# Patient Record
Sex: FEMALE | Race: WHITE | Employment: OTHER | URBAN - METROPOLITAN AREA
[De-identification: names, ages, dates, MRNs, and addresses within clinical notes are randomized per-mention and may not be internally consistent; named-entity substitution may affect disease eponyms.]

---

## 2021-03-16 ENCOUNTER — TELEPHONE (OUTPATIENT)
Dept: PRIMARY CARE CLINIC | Age: 41
End: 2021-03-16

## 2021-03-16 ENCOUNTER — OFFICE VISIT (OUTPATIENT)
Dept: PRIMARY CARE CLINIC | Age: 41
End: 2021-03-16
Payer: MEDICAID

## 2021-03-16 VITALS
SYSTOLIC BLOOD PRESSURE: 120 MMHG | HEIGHT: 64 IN | RESPIRATION RATE: 20 BRPM | TEMPERATURE: 96.2 F | HEART RATE: 89 BPM | OXYGEN SATURATION: 98 % | DIASTOLIC BLOOD PRESSURE: 82 MMHG | BODY MASS INDEX: 45.41 KG/M2 | WEIGHT: 266 LBS

## 2021-03-16 DIAGNOSIS — K58.1 IRRITABLE BOWEL SYNDROME WITH CONSTIPATION: ICD-10-CM

## 2021-03-16 DIAGNOSIS — L73.2 HIDRADENITIS SUPPURATIVA: ICD-10-CM

## 2021-03-16 DIAGNOSIS — F32.A ANXIETY AND DEPRESSION: ICD-10-CM

## 2021-03-16 DIAGNOSIS — N80.9 ENDOMETRIOSIS: ICD-10-CM

## 2021-03-16 DIAGNOSIS — I10 ESSENTIAL HYPERTENSION: ICD-10-CM

## 2021-03-16 DIAGNOSIS — Z80.8 FHX: THYROID CANCER: ICD-10-CM

## 2021-03-16 DIAGNOSIS — L40.50 PSORIATIC ARTHRITIS (HCC): Primary | ICD-10-CM

## 2021-03-16 DIAGNOSIS — E28.2 PCOS (POLYCYSTIC OVARIAN SYNDROME): ICD-10-CM

## 2021-03-16 DIAGNOSIS — F41.9 ANXIETY AND DEPRESSION: ICD-10-CM

## 2021-03-16 PROCEDURE — 99204 OFFICE O/P NEW MOD 45 MIN: CPT | Performed by: FAMILY MEDICINE

## 2021-03-16 PROCEDURE — G8484 FLU IMMUNIZE NO ADMIN: HCPCS | Performed by: FAMILY MEDICINE

## 2021-03-16 PROCEDURE — 1036F TOBACCO NON-USER: CPT | Performed by: FAMILY MEDICINE

## 2021-03-16 PROCEDURE — G8427 DOCREV CUR MEDS BY ELIG CLIN: HCPCS | Performed by: FAMILY MEDICINE

## 2021-03-16 PROCEDURE — G8417 CALC BMI ABV UP PARAM F/U: HCPCS | Performed by: FAMILY MEDICINE

## 2021-03-16 RX ORDER — AMLODIPINE BESYLATE 5 MG/1
5 TABLET ORAL DAILY
COMMUNITY
End: 2021-03-18 | Stop reason: SDUPTHER

## 2021-03-16 RX ORDER — SPIRONOLACTONE 100 MG/1
100 TABLET, FILM COATED ORAL 2 TIMES DAILY
COMMUNITY
End: 2021-11-24

## 2021-03-16 RX ORDER — ESCITALOPRAM OXALATE 20 MG/1
TABLET ORAL
COMMUNITY
Start: 2021-01-12 | End: 2022-04-20 | Stop reason: SDUPTHER

## 2021-03-16 RX ORDER — LANCETS 33 GAUGE
EACH MISCELLANEOUS
COMMUNITY
Start: 2020-09-08

## 2021-03-16 RX ORDER — LORATADINE 10 MG/1
10 TABLET ORAL DAILY
COMMUNITY
End: 2022-05-02 | Stop reason: ALTCHOICE

## 2021-03-16 RX ORDER — PREDNISONE 10 MG/1
40 TABLET ORAL DAILY PRN
COMMUNITY

## 2021-03-16 RX ORDER — CLINDAMYCIN PHOSPHATE 11.9 MG/ML
SOLUTION TOPICAL
COMMUNITY
Start: 2021-01-30

## 2021-03-16 RX ORDER — INFLIXIMAB-DYYB 100 MG/10ML
INJECTION, POWDER, LYOPHILIZED, FOR SOLUTION INTRAVENOUS
COMMUNITY

## 2021-03-16 RX ORDER — BENZONATATE 100 MG/1
100 CAPSULE ORAL 3 TIMES DAILY PRN
COMMUNITY
Start: 2021-02-09 | End: 2022-05-19

## 2021-03-16 RX ORDER — BUSPIRONE HYDROCHLORIDE 5 MG/1
TABLET ORAL
COMMUNITY
Start: 2020-12-28 | End: 2021-07-26 | Stop reason: SDUPTHER

## 2021-03-16 RX ORDER — POLYETHYLENE GLYCOL 3350 17 G/17G
17 POWDER, FOR SOLUTION ORAL DAILY
COMMUNITY
Start: 2020-06-19

## 2021-03-16 RX ORDER — PHENTERMINE HYDROCHLORIDE 37.5 MG/1
37.5 TABLET ORAL
COMMUNITY
Start: 2021-02-17 | End: 2021-03-19

## 2021-03-16 RX ORDER — PANTOPRAZOLE SODIUM 40 MG/1
TABLET, DELAYED RELEASE ORAL
COMMUNITY
Start: 2021-02-27

## 2021-03-16 RX ORDER — SEMAGLUTIDE 1.34 MG/ML
1 INJECTION, SOLUTION SUBCUTANEOUS
COMMUNITY
Start: 2021-01-21

## 2021-03-16 RX ORDER — ONDANSETRON 4 MG/1
4 TABLET, FILM COATED ORAL EVERY 8 HOURS PRN
COMMUNITY
Start: 2020-10-08 | End: 2022-05-02

## 2021-03-16 SDOH — ECONOMIC STABILITY: TRANSPORTATION INSECURITY
IN THE PAST 12 MONTHS, HAS LACK OF TRANSPORTATION KEPT YOU FROM MEETINGS, WORK, OR FROM GETTING THINGS NEEDED FOR DAILY LIVING?: NO

## 2021-03-16 SDOH — ECONOMIC STABILITY: FOOD INSECURITY: WITHIN THE PAST 12 MONTHS, YOU WORRIED THAT YOUR FOOD WOULD RUN OUT BEFORE YOU GOT MONEY TO BUY MORE.: NEVER TRUE

## 2021-03-16 SDOH — ECONOMIC STABILITY: FOOD INSECURITY: WITHIN THE PAST 12 MONTHS, THE FOOD YOU BOUGHT JUST DIDN'T LAST AND YOU DIDN'T HAVE MONEY TO GET MORE.: NEVER TRUE

## 2021-03-16 ASSESSMENT — ENCOUNTER SYMPTOMS
CONSTIPATION: 1
NAUSEA: 1
SORE THROAT: 0
ABDOMINAL PAIN: 1
BLOOD IN STOOL: 1
SHORTNESS OF BREATH: 0
COUGH: 0
VOMITING: 1

## 2021-03-16 NOTE — PROGRESS NOTES
Chief Complaint   Patient presents with    New Patient     NP to establish         Franki Lefort is a 36 y.o. female who presents to be established. Pt has a hx of psoriatic arthritis and is followed by Rheumatology and is on IV inflectra medication x 2 months with fair control (pt used to be on IV remicade but had to switch to another medication secondary to insurance issues)    Patient had recent bloodwork [ESR, CRP, CMP, CBC] 02/20/2021 which was WNL except for an elevated ESR 54    Pt denies any hx of diabetes or CAD and pt reports a normal cardiac cath in 2010    Pt has a hx of Hidradenitis suppurativa since she was a teenager and is followed by [facility] and has had multiple surgeries (most recently 01/2020) along with an episode sepsis 2018. 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 Pt is with good control on IV inflectra medication and does take bursts of prednisone as needed     Patient has a past medical history significant for HTN and is on norvasc 5 mg daily    Pt is on linzess and protonix for irritable bowel and was being followed by GI and takes miralax daily but ran out of her linzess 2 weeks ago    Pt is on lexapro and buspar for anxiety/depression with good control. Pt is followed by Weight Management and is on adipex and ozempic and has lost 30 lbs    Pt has a hx of PCOS and endometriosis and is followed by OB/Gyne and is on mirena medication    Surgical hx includes cholecystectomy, detached retina L eye surgery and pt is followed by Ophthalmology      Pt is a nonsmoker and denies alcohol use    FHx positive for thyroid cancer (sister) and early CAD (father)    Review of Systems   Constitutional: Positive for fatigue. Negative for fever. HENT: Negative for nosebleeds and sore throat. Eyes:        Negative blurred vision or diplopia   Respiratory: Negative for cough and shortness of breath.     Cardiovascular: Positive for leg swelling (chronic and pt reports normal LE venous dopplers 2 years ago). Negative for chest pain and palpitations. Gastrointestinal: Positive for abdominal pain (crampy at times (chronic)), blood in stool (at times and pt reports normal colonoscopy 2017), constipation (with no BM x 10 days), nausea (at times) and vomiting (at times). Negative melena or indigestion   Endocrine: Negative for polydipsia and polyuria. Genitourinary: Negative for dysuria and hematuria. Musculoskeletal: Positive for arthralgias. Skin: Positive for rash (psoriatic at times). Neurological: Positive for headaches (at times). Negative for dizziness, seizures, syncope, speech difficulty and weakness. Psychiatric/Behavioral: Negative for dysphoric mood. The patient is not nervous/anxious. Vitals:    03/16/21 1029   BP: 120/82   Pulse: 89   Resp: 20   Temp: 96.2 °F (35.7 °C)   SpO2: 98%         Physical Exam  Vitals signs reviewed. Constitutional:       General: She is not in acute distress. Appearance: She is obese. HENT:      Mouth/Throat:      Mouth: Mucous membranes are moist.      Pharynx: Oropharynx is clear. Eyes:      General: No scleral icterus. Comments: Pink conjunctivae    Neck:      Thyroid: No thyromegaly. Comments: No carotid bruits noted B/L  Cardiovascular:      Heart sounds: Normal heart sounds. No murmur. No friction rub. No gallop. Pulmonary:      Effort: Pulmonary effort is normal.      Breath sounds: Normal breath sounds. Abdominal:      Palpations: Abdomen is soft. Tenderness: There is abdominal tenderness (mild diffuse). There is no guarding or rebound. Musculoskeletal:      Comments: Positive  2-3+ leg edema but no calf tendernous to palpation noted B/L   Lymphadenopathy:      Cervical: No cervical adenopathy. Skin:     Findings: No rash. Neurological:      Mental Status: She is alert.       Comments: Cranial nerves 2 - 12 grossly intact; Muscle strength 5/5 throughout Psychiatric:         Mood and Affect: Mood normal.         ASSESSMENT AND PLAN    1. Essential hypertension  Patient clinically stable on present medications and denies any CP or SOB and had recent normal CMP bloodwork     2. PCOS (polycystic ovarian syndrome)/Endometriosis  Pt is being managed by OB/Gyne and is on Mirena IUD    4. Hidradenitis suppurativa  Pt is being managed by a specialist at [facility]     5. Psoriatic arthritis ([address]  Pt is being managed by Rheumatology and is with decent control on IV  inflectra medication    6. FHx: thyroid cancer  Will check screening thyroid US  - US THYROID; Future    7. Irritable bowel syndrome with constipation  Pt has been constipated for 10 days and is with mild diffuse abdominal tendernous to palpation on PE and will resume pt on her linzess medication for control and Refer pt to GI for evaluation  - linaclotide (LINZESS) 145 MCG capsule; Take 1 capsule by mouth 2 times daily  Dispense: 60 capsule; Refill: 1  - [facility] - Florentino Klein MD, Gastroenterology, [facility]    8. Anxiety and depression  Patient clinically stable on present medications        Artemio Davis MD      Return in about 3 months (around 6/16/2021). Patient Instructions     Patient Education      Go to the ER ASAP if you develop any chest pain, shortness of breath, facial droop, slurred speech, weakness/numbness in your arms or legs or double vision! Learning About Hidradenitis Suppurativa  What is hidradenitis suppurativa? Hidradenitis suppurativa (say \"ghl-bifj-hx-NY-tus sup-yur-uh-TY-vuh\") is a skin condition that causes lumps on the skin. The lumps look like pimples or boils. The condition can come and go for many years. Doctors don't know exactly how this condition starts. But they do know that something irritates and inflames the hair follicles, causing them to swell and form lumps.  This skin condition can't be spread from person to person (isn't contagious). What are the symptoms? Red lumps that may look like pimples, acne, or boils appear on the skin and are usually painful. The lumps:  · Usually occur in areas where skin rubs against skin, such as in the armpit. They can also appear under the breasts, in the groin area, on the buttocks, around the anus, and on the inner thighs. · May go away on their own in a few weeks. But they often come back in the same area. · Can become infected and break open, draining blood and pus that usually smells bad. If the condition isn't treated and gets worse, hollow tunnels can form under the skin. Over time, the infection and tunnels will heal, but a thick scar may form. These scars can keep skin from stretching naturally. How is hidradenitis suppurativa treated? The treatment depends on how serious the condition is. Your doctor may discuss:  · Medicines. You may need to take pills, such as antibiotics, or rub a prescription ointment or cream on the affected skin. · Corticosteroid injections (shots). You may get these shots into the affected areas. · Hormone pills. Some women are helped by taking birth control pills or other medicines that affect their hormones. · Removing infected tissue. Home treatment  Home treatment may provide pain relief and help prevent nodules from coming back. Home treatment includes:  · Wearing loose-fitting clothes. · Washing the area gently with mild soap. · Staying at a healthy weight. If you are overweight, losing weight may help the condition. · Quitting smoking, if you smoke. Follow-up care is a key part of your treatment and safety. Be sure to make and go to all appointments, and call your doctor if you are having problems. It's also a good idea to know your test results and keep a list of the medicines you take. Where can you learn more? Go to https://chtabby.[employer]. org and sign in to your M[employer] account.  Enter [phone] [id number] in the [employer] box to learn more about \"Learning About Hidradenitis Suppurativa. \"     If you do not have an account, please click on the \"Sign Up Now\" link. Current as of: July 2, 2020               Content Version: 12.8  © 3938-6554 Healthwise, Incorporated. Care instructions adapted under license by [facility] ([facility]). If you have questions about a medical condition or this instruction, always ask your healthcare professional. [address] any warranty or liability for your use of this information.

## 2021-03-16 NOTE — PATIENT INSTRUCTIONS
Patient Education      Go to the ER ASAP if you develop any chest pain, shortness of breath, facial droop, slurred speech, weakness/numbness in your arms or legs or double vision! Learning About Hidradenitis Suppurativa  What is hidradenitis suppurativa? Hidradenitis suppurativa (say \"ffj-kmoh-pz-NY-tus sup-yur-uh-TY-vuh\") is a skin condition that causes lumps on the skin. The lumps look like pimples or boils. The condition can come and go for many years. Doctors don't know exactly how this condition starts. But they do know that something irritates and inflames the hair follicles, causing them to swell and form lumps. This skin condition can't be spread from person to person (isn't contagious). What are the symptoms? Red lumps that may look like pimples, acne, or boils appear on the skin and are usually painful. The lumps:  · Usually occur in areas where skin rubs against skin, such as in the armpit. They can also appear under the breasts, in the groin area, on the buttocks, around the anus, and on the inner thighs. · May go away on their own in a few weeks. But they often come back in the same area. · Can become infected and break open, draining blood and pus that usually smells bad. If the condition isn't treated and gets worse, hollow tunnels can form under the skin. Over time, the infection and tunnels will heal, but a thick scar may form. These scars can keep skin from stretching naturally. How is hidradenitis suppurativa treated? The treatment depends on how serious the condition is. Your doctor may discuss:  · Medicines. You may need to take pills, such as antibiotics, or rub a prescription ointment or cream on the affected skin. · Corticosteroid injections (shots). You may get these shots into the affected areas. · Hormone pills. Some women are helped by taking birth control pills or other medicines that affect their hormones. · Removing infected tissue.   Home treatment  Home treatment may provide pain relief and help prevent nodules from coming back. Home treatment includes:  · Wearing loose-fitting clothes. · Washing the area gently with mild soap. · Staying at a healthy weight. If you are overweight, losing weight may help the condition. · Quitting smoking, if you smoke. Follow-up care is a key part of your treatment and safety. Be sure to make and go to all appointments, and call your doctor if you are having problems. It's also a good idea to know your test results and keep a list of the medicines you take. Where can you learn more? Go to https://GridCurepeMostroeweb.[employer]. org and sign in to your [employer] account. Enter [phone] [id number] in the Servoyant box to learn more about \"Learning About Hidradenitis Suppurativa. \"     If you do not have an account, please click on the \"Sign Up Now\" link. Current as of: July 2, 2020               Content Version: 12.8  © 2006-2021 Healthwise, Incorporated. Care instructions adapted under license by [facility] ([facility]). If you have questions about a medical condition or this instruction, always ask your healthcare professional. [place] [address] any warranty or liability for your use of this information.

## 2021-03-18 DIAGNOSIS — I10 ESSENTIAL HYPERTENSION: Primary | ICD-10-CM

## 2021-03-18 RX ORDER — AMLODIPINE BESYLATE 5 MG/1
5 TABLET ORAL DAILY
Qty: 30 TABLET | Refills: 2 | Status: SHIPPED | OUTPATIENT
Start: 2021-03-18 | End: 2021-07-12

## 2021-03-18 NOTE — TELEPHONE ENCOUNTER
Medication:   Requested Prescriptions     Pending Prescriptions Disp Refills    amLODIPine (NORVASC) 5 MG tablet 30 tablet      Sig: Take 1 tablet by mouth daily        Last Filled:      Patient Phone Number: [phone] (home)     Last appt: 3/16/2021   Next appt: 6/16/2021    Last OARRS: No flowsheet data found.

## 2021-03-18 NOTE — TELEPHONE ENCOUNTER
Received another denial for Linzess, per Dr Michelle Lezama follow up with patient and find out when she is scheduled with GI. Spoke to patient and she has appointment with GI tomorrow (3/19/21). She will let them know about the denial and have them take over.

## 2021-03-22 ENCOUNTER — HOSPITAL ENCOUNTER (OUTPATIENT)
Dept: ULTRASOUND IMAGING | Age: 41
Discharge: HOME | End: 2021-03-22
Payer: MEDICAID

## 2021-03-22 DIAGNOSIS — E04.1 THYROID NODULE: Primary | ICD-10-CM

## 2021-03-22 DIAGNOSIS — Z80.8 FHX: THYROID CANCER: ICD-10-CM

## 2021-03-22 PROCEDURE — 76536 US EXAM OF HEAD AND NECK: CPT

## 2021-03-24 ENCOUNTER — OFFICE VISIT (OUTPATIENT)
Dept: ENT CLINIC | Age: 41
End: 2021-03-24
Payer: MEDICAID

## 2021-03-24 ENCOUNTER — PROCEDURE VISIT (OUTPATIENT)
Dept: AUDIOLOGY | Age: 41
End: 2021-03-24
Payer: MEDICAID

## 2021-03-24 VITALS — TEMPERATURE: 96.6 F | SYSTOLIC BLOOD PRESSURE: 118 MMHG | DIASTOLIC BLOOD PRESSURE: 83 MMHG | HEART RATE: 94 BPM

## 2021-03-24 DIAGNOSIS — Z96.22 S/P TYMPANOSTOMY TUBE PLACEMENT: ICD-10-CM

## 2021-03-24 DIAGNOSIS — H69.83 DYSFUNCTION OF BOTH EUSTACHIAN TUBES: ICD-10-CM

## 2021-03-24 DIAGNOSIS — H90.3 SENSORINEURAL HEARING LOSS (SNHL) OF BOTH EARS: Primary | ICD-10-CM

## 2021-03-24 DIAGNOSIS — E04.1 THYROID NODULE: Primary | ICD-10-CM

## 2021-03-24 PROCEDURE — G8427 DOCREV CUR MEDS BY ELIG CLIN: HCPCS | Performed by: STUDENT IN AN ORGANIZED HEALTH CARE EDUCATION/TRAINING PROGRAM

## 2021-03-24 PROCEDURE — 1036F TOBACCO NON-USER: CPT | Performed by: STUDENT IN AN ORGANIZED HEALTH CARE EDUCATION/TRAINING PROGRAM

## 2021-03-24 PROCEDURE — 99204 OFFICE O/P NEW MOD 45 MIN: CPT | Performed by: STUDENT IN AN ORGANIZED HEALTH CARE EDUCATION/TRAINING PROGRAM

## 2021-03-24 PROCEDURE — G8417 CALC BMI ABV UP PARAM F/U: HCPCS | Performed by: STUDENT IN AN ORGANIZED HEALTH CARE EDUCATION/TRAINING PROGRAM

## 2021-03-24 PROCEDURE — 92557 COMPREHENSIVE HEARING TEST: CPT | Performed by: AUDIOLOGIST

## 2021-03-24 PROCEDURE — G8484 FLU IMMUNIZE NO ADMIN: HCPCS | Performed by: STUDENT IN AN ORGANIZED HEALTH CARE EDUCATION/TRAINING PROGRAM

## 2021-03-24 NOTE — PROGRESS NOTES
[id number] [facility] NECK SURGERY  NEW PATIENT HISTORY AND PHYSICAL NOTE      Patient Name: Radha Manriquez  Medical Record Number:  <[id number]>  Primary Care Physician:  Melissa Jones MD    ChiefComplaint     Chief Complaint   Patient presents with    Ear Problem     tubes checked       History of Present Illness     Radha Manriquez is an 36 y.o. female presenting with ear tube check and recent thyroid ultrasound results. Tubes in both ears placed over 1 year ago by Dr. Umang Blackburn with [facility] for chronic serous otitis media of right ear and eustachian tube disorder bilateral.  She recently changed insurance and cannot see her former ENT anymore. Her hearing in both ears has been stable and she will have occasional high-frequency noise in her right ear is minimally bothersome to her. Recent thyroid ultrasound demonstrates right-sided thyroid nodule. Sister with Hurthle cell thyroid cancer. No personal radiation exposure history. Denies dysphagia, dysphonia, odynophagia, cosmetic irregularity of the neck.     Past Medical History     Past Medical History:   Diagnosis Date    Endometriosis     Hypertension     Lumbar disc disorder     Nausea & vomiting     Polycystic ovarian syndrome     Sleep apnea 1/22/2014       Past Surgical History     Past Surgical History:   Procedure Laterality Date    CARDIAC SURGERY      cardia cath-no blockage    CHOLECYSTECTOMY  2008    EYE SURGERY  2010    detached retina    OTHER SURGICAL HISTORY  01/21/14    L4-5 BILATERAL LAMINECTOMY AND DISCECTOMY    OVARY SURGERY      multiple times    TONSILLECTOMY  1985    TYMPANOSTOMY TUBE PLACEMENT         Family History     Family History   Problem Relation Age of Onset    Kidney Disease Mother         CKD    Hypertension Mother     Diabetes Mother     COPD Father     Asthma Brother     Elevated Lipids Sister         hypertriglyceridemia       Social History     Social History Tobacco Use    Smoking status: Never Smoker    Smokeless tobacco: Never Used   Substance Use Topics    Alcohol use: No    Drug use: No        Allergies     Allergies   Allergen Reactions    Adhesive Tape      Skin reddens, use paper tape    Cinnamon Rash       Medications     Current Outpatient Medications   Medication Sig Dispense Refill    amLODIPine (NORVASC) 5 MG tablet Take 1 tablet by mouth daily 30 tablet 2    benzonatate (TESSALON) 100 MG capsule Take 100 mg by mouth 3 times daily as needed      benzoyl peroxide 5 % external liquid       busPIRone (BUSPAR) 5 MG tablet TAKE ONE TABLET BY MOUTH TWICE A DAY      clindamycin (CLEOCIN T) 1 % external solution       escitalopram (LEXAPRO) 20 MG tablet TAKE ONE TABLET BY MOUTH DAILY      Lancets (ONETOUCH DELICA PLUS [id number]) MISC Use to test blood glucose once daily      loratadine (CLARITIN) 10 MG tablet Take 10 mg by mouth daily      metroNIDAZOLE (METROCREAM) 0.75 % cream APPLY TO RASH ON FACE TWO TIMES A DAY      mupirocin (BACTROBAN) 2 % ointment APPLY TO OPEN SORES TWO TIMES A DAY      nystatin-triamcinolone (MYCOLOG II) 850163-4.1 UNIT/GM-% cream APPLY TOPICALLY THREE TIMES A DAY      ondansetron (ZOFRAN) 4 MG tablet Take 4 mg by mouth every 8 hours as needed      pantoprazole (PROTONIX) 40 MG tablet TAKE ONE TABLET BY MOUTH TWICE A DAY BEFORE MEALS      polyethylene glycol (GLYCOLAX) 17 GM/SCOOP powder Take 17 g by mouth daily      Semaglutide, 1 MG/DOSE, (OZEMPIC, 1 MG/DOSE,) 2 MG/1.5ML SOPN Inject 1 mg into the skin every 7 days      silver sulfADIAZINE (SILVADENE) 1 % cream Apply 1 squirt to wound daily. Continue until seen in follow-up with physician.       predniSONE (DELTASONE) 10 MG tablet Take 40 mg by mouth daily as needed      spironolactone (ALDACTONE) 100 MG tablet Take 100 mg by mouth 2 times daily      inFLIXimab (INFLECTRA) 100 MG SOLR Infuse intravenously every 28 days      UNABLE TO FIND Apply 2 % topically key or critical portions of the service. Data/Imaging Review     Comprehensive audiological evaluation performed in the office today by Rin GUY was independently reviewed by myself which demonstrates: Au: Low-frequency sensorineural hearing loss in lowest tested frequencies sloping to normal hearing loss    % right, WRS 96% left. Impedance testing not performed. Narrative   Thyroid ultrasound.       HISTORY: Family history of thyroid cancer.       The right lobe measures 4.4 x 1.2 x 1.9 cm       The left lobe measures 3.8 x 1.1 x 1.4 cm       The isthmus measures 3 mm.       Right lobe nodules: Oval solid nodule moderately hypoechoic inferior pole 9 x 9 x 6 mm indeterminate. Biopsy recommended.       Left lobe nodules: None.           Impression       9 mm solid nodule moderately hypoechoic inferior pole on the right indeterminate. Biopsy recommended as neoplasm is not excluded           Assessment and Plan     1. Thyroid nodule  - US BIOPSY THYROID; Future  - TSH with reflex    2. Dysfunction of both eustachian tubes  3. S/P tympanostomy tube placement  -Tympanostomy tubes in place without issue  -We will continue to monitor for expected extrusion. If she develops symptoms after extrusion can consider replacement of tympanostomy tube in the office. Follow Up     Return for After thyroid FNA. Dr. Consuelo Anderson [place] [id number]  Department of Otolaryngology/Head & Neck Surgery  3/24/21    Medical Decision Making: The following items were considered in medical decision making:  Independent review of images  Review / order clinical lab tests  Review / order radiology tests  Decision to obtain old records  Office notes from Dr. Fabien Pereira of this note were dictated using Dragon.  There may be linguistic errors secondary to the use of this program.

## 2021-03-26 ENCOUNTER — PATIENT MESSAGE (OUTPATIENT)
Dept: PRIMARY CARE CLINIC | Age: 41
End: 2021-03-26

## 2021-03-26 DIAGNOSIS — Z01.419 ENCOUNTER FOR GYNECOLOGICAL EXAMINATION: Primary | ICD-10-CM

## 2021-03-26 DIAGNOSIS — M79.672 FOOT PAIN, LEFT: ICD-10-CM

## 2021-03-26 DIAGNOSIS — E04.1 THYROID NODULE: ICD-10-CM

## 2021-03-26 DIAGNOSIS — E28.2 PCOS (POLYCYSTIC OVARIAN SYNDROME): ICD-10-CM

## 2021-03-26 DIAGNOSIS — R63.8 WEIGHT DISORDER: ICD-10-CM

## 2021-03-26 LAB — TSH REFLEX: 0.82 UIU/ML (ref 0.27–4.2)

## 2021-03-29 NOTE — TELEPHONE ENCOUNTER
Please give patient a list of [facility] OB/Gyne and Podiatry physicians in her geographic area to choose from

## 2021-03-29 NOTE — TELEPHONE ENCOUNTER
From: Jyoti Roe  To: Janis Thurston MD  Sent: 3/26/2021 10:50 AM EDT  Subject: Non-Urgent Medical Question    Hello,  I have not been able to find a obgyn or a foot doctor yet that is part of [facility], do you have any suggestions as to who to call. Obgyn is just to reestablish   Foot Sheeba Cerna  had issues with nails that needed removed and pain in left foot. I had an emg recently and it did not show any neuropathy.    Thanks

## 2021-04-06 ENCOUNTER — HOSPITAL ENCOUNTER (OUTPATIENT)
Dept: ULTRASOUND IMAGING | Age: 41
Discharge: HOME | End: 2021-04-06
Payer: MEDICAID

## 2021-04-06 DIAGNOSIS — E04.1 THYROID NODULE: ICD-10-CM

## 2021-04-06 PROCEDURE — 88172 CYTP DX EVAL FNA 1ST EA SITE: CPT

## 2021-04-06 PROCEDURE — 10005 FNA BX W/US GDN 1ST LES: CPT

## 2021-04-06 PROCEDURE — 88173 CYTOPATH EVAL FNA REPORT: CPT

## 2021-04-06 PROCEDURE — 88305 TISSUE EXAM BY PATHOLOGIST: CPT

## 2021-04-09 ENCOUNTER — TELEPHONE (OUTPATIENT)
Dept: ENT CLINIC | Age: 41
End: 2021-04-09

## 2021-04-09 DIAGNOSIS — E04.1 THYROID NODULE: Primary | ICD-10-CM

## 2021-04-12 ENCOUNTER — TELEPHONE (OUTPATIENT)
Dept: ENT CLINIC | Age: 41
End: 2021-04-12

## 2021-04-12 NOTE — TELEPHONE ENCOUNTER
Missed a call from our office. There was no documentation.  Patient stated she was supposed to get a call regarding her fna

## 2021-04-13 NOTE — TELEPHONE ENCOUNTER
I called the patient and spoke with her and relayed her nondiagnostic recent thyroid FNA results. I gave her the option of repeating FNA versus repeat ultrasound in 6 months. Given the small size, subcentimeter, of her thyroid nodule she could very well get another nondiagnostic FNA and we would be in the same situation we are in down. She would like to proceed with getting a repeat thyroid ultrasound in approximately 6 months. Order for this is placed and she will follow-up after ultrasound is performed.

## 2021-04-15 ENCOUNTER — OFFICE VISIT (OUTPATIENT)
Dept: ORTHOPEDIC SURGERY | Age: 41
End: 2021-04-15
Payer: MEDICAID

## 2021-04-15 VITALS — HEIGHT: 64 IN | WEIGHT: 266 LBS | BODY MASS INDEX: 45.41 KG/M2 | TEMPERATURE: 98 F

## 2021-04-15 DIAGNOSIS — M79.672 LEFT FOOT PAIN: Primary | ICD-10-CM

## 2021-04-15 DIAGNOSIS — M84.375A STRESS REACTION OF LEFT FOOT, INITIAL ENCOUNTER: ICD-10-CM

## 2021-04-15 PROCEDURE — 99213 OFFICE O/P EST LOW 20 MIN: CPT | Performed by: ORTHOPAEDIC SURGERY

## 2021-04-15 PROCEDURE — G8417 CALC BMI ABV UP PARAM F/U: HCPCS | Performed by: ORTHOPAEDIC SURGERY

## 2021-04-15 PROCEDURE — G8427 DOCREV CUR MEDS BY ELIG CLIN: HCPCS | Performed by: ORTHOPAEDIC SURGERY

## 2021-04-15 NOTE — PROGRESS NOTES
[facility]  Outpatient Progress Note  Jayy Carreno MD    Patient Name: Naye Saenz MRN: [id number]   Age: 36 y.o. YOB: 1980   Sex: female      [address] Complaint   Patient presents with    New Patient     Left foot pain x 2 years intermittently, no injury        HISTORY OF PRESENT ILLNESS   Naye Saenz is a 36 y.o. female referred by Dr. Tarsha Luis for evaluation of left foot pain. Patient reports pain in the lateral aspect of her left foot. She notes pain with activity. She notes resolution of pain with rest.  She is able to reproduce the pain by pressing on the region of the fifth metatarsal.  Symptoms present for 2 years. No specific injury. She does report she has a history of difficulties with her lower back and has had discectomy in the past.  Some consideration for foot pain etiology had been made for radiculopathy/radiculitis. She had an EMG done by Dr. Breezy Carter on March 10, 2021. This was negative for peripheral nerve entrapment or radiculopathy or peripheral neuropathy.     Pain Assessment  Location of Pain: Foot  Location Modifiers: Left  Severity of Pain: 4  Quality of Pain: Sharp, Aching  Duration of Pain: Persistent  Frequency of Pain: Intermittent  Aggravating Factors: Standing  Limiting Behavior: Yes  Relieving Factors: Rest  Result of Injury: No  Work-Related Injury: No  Are there other pain locations you wish to document?: No    PAST MEDICAL HISTORY      Past Medical History:   Diagnosis Date    Endometriosis     Hypertension     Lumbar disc disorder     Nausea & vomiting     Polycystic ovarian syndrome     Sleep apnea 1/22/2014       PAST SURGICAL HISTORY     Past Surgical History:   Procedure Laterality Date    CARDIAC SURGERY      cardia cath-no blockage    CHOLECYSTECTOMY  2008    EYE SURGERY  2010    detached retina    OTHER SURGICAL HISTORY  01/21/14    L4-5 BILATERAL LAMINECTOMY AND DISCECTOMY    OVARY SURGERY      multiple times    TONSILLECTOMY  1985    TYMPANOSTOMY TUBE PLACEMENT         MEDICATIONS     Current Outpatient Medications   Medication Sig Dispense Refill    amLODIPine (NORVASC) 5 MG tablet Take 1 tablet by mouth daily 30 tablet 2    benzonatate (TESSALON) 100 MG capsule Take 100 mg by mouth 3 times daily as needed      benzoyl peroxide 5 % external liquid       busPIRone (BUSPAR) 5 MG tablet TAKE ONE TABLET BY MOUTH TWICE A DAY      clindamycin (CLEOCIN T) 1 % external solution       escitalopram (LEXAPRO) 20 MG tablet TAKE ONE TABLET BY MOUTH DAILY      Lancets (ONETOUCH DELICA PLUS [id number]) MISC Use to test blood glucose once daily      loratadine (CLARITIN) 10 MG tablet Take 10 mg by mouth daily      metroNIDAZOLE (METROCREAM) 0.75 % cream APPLY TO RASH ON FACE TWO TIMES A DAY      mupirocin (BACTROBAN) 2 % ointment APPLY TO OPEN SORES TWO TIMES A DAY      nystatin-triamcinolone (MYCOLOG II) 532677-3.1 UNIT/GM-% cream APPLY TOPICALLY THREE TIMES A DAY      ondansetron (ZOFRAN) 4 MG tablet Take 4 mg by mouth every 8 hours as needed      pantoprazole (PROTONIX) 40 MG tablet TAKE ONE TABLET BY MOUTH TWICE A DAY BEFORE MEALS      polyethylene glycol (GLYCOLAX) 17 GM/SCOOP powder Take 17 g by mouth daily      Semaglutide, 1 MG/DOSE, (OZEMPIC, 1 MG/DOSE,) 2 MG/1.5ML SOPN Inject 1 mg into the skin every 7 days      silver sulfADIAZINE (SILVADENE) 1 % cream Apply 1 squirt to wound daily. Continue until seen in follow-up with physician.       predniSONE (DELTASONE) 10 MG tablet Take 40 mg by mouth daily as needed      spironolactone (ALDACTONE) 100 MG tablet Take 100 mg by mouth 2 times daily      inFLIXimab (INFLECTRA) 100 MG SOLR Infuse intravenously every 28 days      UNABLE TO FIND Apply 2 % topically as needed tofacitinib 2% ointment      linaclotide (LINZESS) 145 MCG capsule Take 1 capsule by mouth 2 times daily 60 capsule 1     No current facility-administered medications for this visit.         ALLERGIES     Allergies   Allergen Reactions    Adhesive Tape      Skin reddens, use paper tape    Cinnamon Rash       FAMILY HISTORY     Family History   Problem Relation Age of Onset    Kidney Disease Mother         CKD    Hypertension Mother     Diabetes Mother     COPD Father     Asthma Brother     Elevated Lipids Sister         hypertriglyceridemia       SOCIAL HISTORY     Social History     Socioeconomic History    Marital status:      Spouse name: None    Number of children: 0    Years of education: None    Highest education level: None   Occupational History    Occupation:    Social Needs    Financial resource strain: Not hard at all   [place] insecurity     Worry: Never true     Inability: Never true    Transportation needs     Medical: No     Non-medical: No   Tobacco Use    Smoking status: Never Smoker    Smokeless tobacco: Never Used   Substance and Sexual Activity    Alcohol use: No    Drug use: No    Sexual activity: None   Lifestyle    Physical activity     Days per week: None     Minutes per session: None    Stress: None   Relationships    Social connections     Talks on phone: None     Gets together: None     Attends Yarsanism service: None     Active member of club or organization: None     Attends meetings of clubs or organizations: None     Relationship status: None    Intimate partner violence     Fear of current or ex partner: None     Emotionally abused: None     Physically abused: None     Forced sexual activity: None   Other Topics Concern    None   Social History Narrative    None       REVIEW OF SYSTEMS   General: no fever, chills, night sweats, anorexia, malaise, fatigue, or weight change  Hematologic:  no unexplained bleeding or bruising  HEENT:   no nasal congestion, rhinorrhea, sore throat, or facial pain  Respiratory:  no cough, dyspnea, or chest pain  Cardiovascular:  no angina, ROBERSON, PND, orthopnea, dependent edema, or palpitations  Gastrointestinal:  no nausea, vomiting, diarrhea, constipation, or abdominal pain  Genitourinary:  no urinary urgency, frequency, dysuria, or hematuria  Musculoskeletal: see HPI  Endocrine:  no heat or cold intolerance and no polyphagia, polydipsia, or polyuria  Skin:  no skin eruptions or changing lesions  Neurologic:  no focal weakness, numbness/tingling, tremor, or severe headache. See HPI. See HPI for pertinent positives. PHYSICAL EXAM   Vital Signs:   Vitals:    04/15/21 1252   Temp: 98 °F (36.7 °C)   Weight: 266 lb (120.7 kg)   Height: 5' 4\" (1.626 m)       General appearance: healthy, alert, no distress  Skin: Skin color, texture, turgor normal. No rashes or lesions  HEENT: atraumatic, normocephalic. PERRL  Respiratory: Unlabored breathing  Lymphatic: No adenopathy   Neuro: Alert and oriented, normal distal sensation, normal bilateral DTRs  Vascular: Normal distal capillary and distal pulses  Muskuloskeletal Exam: Left foot examination demonstrates no swelling erythema warmth ecchymosis or edema. Weightbearing alignment of the ankle hindfoot midfoot and forefoot are normal.  Gait is normal.  Range of motion of the ankle is full. Hindfoot is supple. There is point tenderness to palpation in the fifth metatarsal shaft and neck. No tenderness in the peroneal tendons. No tenderness or swelling in the Achilles or posterior tib tendons. No tenderness in the hindfoot. RADIOLOGY   X-rays obtained and reviewed in office:  Views standing bilateral feet 3 views    Impression: No obvious acute bony abnormality or osseous pathology    IMPRESSION     1. Left foot pain         PLAN   I had a lengthy discussion with patient today regarding diagnosis and treatment options and recommendations. Symptoms suggestive of stress reaction in the foot however symptoms have been present for 2 years. No clear etiology identifiable on examination.   Recommend further work-up with MRI scan of the left foot    FOLLOWUP     No follow-ups on file. Orders Placed This Encounter   Procedures    XR FOOT LEFT (MIN 3 VIEWS)     Order Specific Question:   Reason for exam:     Answer:   pain      No orders of the defined types were placed in this encounter.       Patient was instructed on appropriate use of braces, participation in home exercise programs, healthy lifestyle choices and weight loss as appropriate     Hetal Liu MD

## 2021-04-19 ENCOUNTER — TELEPHONE (OUTPATIENT)
Dept: ORTHOPEDIC SURGERY | Age: 41
End: 2021-04-19

## 2021-04-19 NOTE — TELEPHONE ENCOUNTER
Ariadna Ramu # [id number] - VALID TO 05/31/2021 - MRI FOOT LEFT -tla    Referral    Patient may schedule scan at her convenience follow up in office post exam with [facility] for results.

## 2021-04-22 ENCOUNTER — VIRTUAL VISIT (OUTPATIENT)
Dept: PRIMARY CARE CLINIC | Age: 41
End: 2021-04-22
Payer: MEDICAID

## 2021-04-22 ENCOUNTER — HOSPITAL ENCOUNTER (EMERGENCY)
Age: 41
Discharge: HOME | End: 2021-04-22
Attending: EMERGENCY MEDICINE
Payer: MEDICAID

## 2021-04-22 ENCOUNTER — APPOINTMENT (OUTPATIENT)
Dept: GENERAL RADIOLOGY | Age: 41
End: 2021-04-22
Payer: MEDICAID

## 2021-04-22 VITALS
HEART RATE: 80 BPM | OXYGEN SATURATION: 100 % | DIASTOLIC BLOOD PRESSURE: 74 MMHG | SYSTOLIC BLOOD PRESSURE: 128 MMHG | BODY MASS INDEX: 44.95 KG/M2 | RESPIRATION RATE: 116 BRPM | HEIGHT: 64 IN | TEMPERATURE: 98.4 F | WEIGHT: 263.3 LBS

## 2021-04-22 DIAGNOSIS — R11.2 NAUSEA AND VOMITING, INTRACTABILITY OF VOMITING NOT SPECIFIED, UNSPECIFIED VOMITING TYPE: Primary | ICD-10-CM

## 2021-04-22 DIAGNOSIS — R11.2 NON-INTRACTABLE VOMITING WITH NAUSEA, UNSPECIFIED VOMITING TYPE: Primary | ICD-10-CM

## 2021-04-22 DIAGNOSIS — R19.7 DIARRHEA, UNSPECIFIED TYPE: ICD-10-CM

## 2021-04-22 LAB
ALBUMIN SERPL-MCNC: 4 G/DL (ref 3.4–5)
ALP BLD-CCNC: 90 U/L (ref 40–129)
ALT SERPL-CCNC: 19 U/L (ref 10–40)
ANION GAP SERPL CALCULATED.3IONS-SCNC: 9 MMOL/L (ref 3–16)
AST SERPL-CCNC: 19 U/L (ref 15–37)
BASOPHILS ABSOLUTE: 0.1 K/UL (ref 0–0.2)
BASOPHILS RELATIVE PERCENT: 0.5 %
BILIRUB SERPL-MCNC: <0.2 MG/DL (ref 0–1)
BILIRUBIN DIRECT: <0.2 MG/DL (ref 0–0.3)
BILIRUBIN URINE: NEGATIVE
BILIRUBIN, INDIRECT: NORMAL MG/DL (ref 0–1)
BLOOD, URINE: NEGATIVE
BUN BLDV-MCNC: 7 MG/DL (ref 7–20)
CALCIUM SERPL-MCNC: 9.1 MG/DL (ref 8.3–10.6)
CHLORIDE BLD-SCNC: 103 MMOL/L (ref 99–110)
CLARITY: CLEAR
CO2: 25 MMOL/L (ref 21–32)
COLOR: YELLOW
CREAT SERPL-MCNC: 0.9 MG/DL (ref 0.6–1.1)
EKG ATRIAL RATE: 77 BPM
EKG DIAGNOSIS: NORMAL
EKG P AXIS: 58 DEGREES
EKG P-R INTERVAL: 190 MS
EKG Q-T INTERVAL: 410 MS
EKG QRS DURATION: 76 MS
EKG QTC CALCULATION (BAZETT): 463 MS
EKG R AXIS: 37 DEGREES
EKG T AXIS: 41 DEGREES
EKG VENTRICULAR RATE: 77 BPM
EOSINOPHILS ABSOLUTE: 0.3 K/UL (ref 0–0.6)
EOSINOPHILS RELATIVE PERCENT: 2.8 %
GFR AFRICAN AMERICAN: >60
GFR NON-AFRICAN AMERICAN: >60
GLUCOSE BLD-MCNC: 83 MG/DL (ref 70–99)
GLUCOSE URINE: NEGATIVE MG/DL
HCT VFR BLD CALC: 37.6 % (ref 36–48)
HEMOGLOBIN: 12.3 G/DL (ref 12–16)
KETONES, URINE: NEGATIVE MG/DL
LEUKOCYTE ESTERASE, URINE: NEGATIVE
LIPASE: 61 U/L (ref 13–60)
LYMPHOCYTES ABSOLUTE: 3.2 K/UL (ref 1–5.1)
LYMPHOCYTES RELATIVE PERCENT: 28.5 %
MCH RBC QN AUTO: 28.3 PG (ref 26–34)
MCHC RBC AUTO-ENTMCNC: 32.8 G/DL (ref 31–36)
MCV RBC AUTO: 86.6 FL (ref 80–100)
MICROSCOPIC EXAMINATION: NORMAL
MONOCYTES ABSOLUTE: 1.1 K/UL (ref 0–1.3)
MONOCYTES RELATIVE PERCENT: 9.9 %
NEUTROPHILS ABSOLUTE: 6.5 K/UL (ref 1.7–7.7)
NEUTROPHILS RELATIVE PERCENT: 58.3 %
NITRITE, URINE: NEGATIVE
PDW BLD-RTO: 14.8 % (ref 12.4–15.4)
PH UA: 7 (ref 5–8)
PLATELET # BLD: 323 K/UL (ref 135–450)
PMV BLD AUTO: 8.6 FL (ref 5–10.5)
POTASSIUM REFLEX MAGNESIUM: 3.8 MMOL/L (ref 3.5–5.1)
PROTEIN UA: NEGATIVE MG/DL
RBC # BLD: 4.35 M/UL (ref 4–5.2)
SODIUM BLD-SCNC: 137 MMOL/L (ref 136–145)
SPECIFIC GRAVITY UA: 1.01 (ref 1–1.03)
TOTAL PROTEIN: 7.3 G/DL (ref 6.4–8.2)
URINE TYPE: NORMAL
UROBILINOGEN, URINE: 0.2 E.U./DL
WBC # BLD: 11.2 K/UL (ref 4–11)

## 2021-04-22 PROCEDURE — 2580000003 HC RX 258: Performed by: EMERGENCY MEDICINE

## 2021-04-22 PROCEDURE — 96375 TX/PRO/DX INJ NEW DRUG ADDON: CPT

## 2021-04-22 PROCEDURE — 80076 HEPATIC FUNCTION PANEL: CPT

## 2021-04-22 PROCEDURE — G8427 DOCREV CUR MEDS BY ELIG CLIN: HCPCS | Performed by: FAMILY MEDICINE

## 2021-04-22 PROCEDURE — 93005 ELECTROCARDIOGRAM TRACING: CPT | Performed by: EMERGENCY MEDICINE

## 2021-04-22 PROCEDURE — 71046 X-RAY EXAM CHEST 2 VIEWS: CPT

## 2021-04-22 PROCEDURE — 99284 EMERGENCY DEPT VISIT MOD MDM: CPT

## 2021-04-22 PROCEDURE — 83690 ASSAY OF LIPASE: CPT

## 2021-04-22 PROCEDURE — 81003 URINALYSIS AUTO W/O SCOPE: CPT

## 2021-04-22 PROCEDURE — 1036F TOBACCO NON-USER: CPT | Performed by: FAMILY MEDICINE

## 2021-04-22 PROCEDURE — 85025 COMPLETE CBC W/AUTO DIFF WBC: CPT

## 2021-04-22 PROCEDURE — 80048 BASIC METABOLIC PNL TOTAL CA: CPT

## 2021-04-22 PROCEDURE — 96374 THER/PROPH/DIAG INJ IV PUSH: CPT

## 2021-04-22 PROCEDURE — 99214 OFFICE O/P EST MOD 30 MIN: CPT | Performed by: FAMILY MEDICINE

## 2021-04-22 PROCEDURE — G8417 CALC BMI ABV UP PARAM F/U: HCPCS | Performed by: FAMILY MEDICINE

## 2021-04-22 PROCEDURE — 6360000002 HC RX W HCPCS: Performed by: EMERGENCY MEDICINE

## 2021-04-22 RX ORDER — MORPHINE SULFATE 4 MG/ML
4 INJECTION, SOLUTION INTRAMUSCULAR; INTRAVENOUS ONCE
Status: COMPLETED | OUTPATIENT
Start: 2021-04-22 | End: 2021-04-22

## 2021-04-22 RX ORDER — ONDANSETRON 2 MG/ML
4 INJECTION INTRAMUSCULAR; INTRAVENOUS ONCE
Status: COMPLETED | OUTPATIENT
Start: 2021-04-22 | End: 2021-04-22

## 2021-04-22 RX ORDER — ONDANSETRON 4 MG/1
4 TABLET, ORALLY DISINTEGRATING ORAL EVERY 8 HOURS PRN
Qty: 20 TABLET | Refills: 0 | Status: SHIPPED | OUTPATIENT
Start: 2021-04-22

## 2021-04-22 RX ORDER — SODIUM CHLORIDE, SODIUM LACTATE, POTASSIUM CHLORIDE, CALCIUM CHLORIDE 600; 310; 30; 20 MG/100ML; MG/100ML; MG/100ML; MG/100ML
1000 INJECTION, SOLUTION INTRAVENOUS ONCE
Status: COMPLETED | OUTPATIENT
Start: 2021-04-22 | End: 2021-04-22

## 2021-04-22 RX ADMIN — SODIUM CHLORIDE, POTASSIUM CHLORIDE, SODIUM LACTATE AND CALCIUM CHLORIDE 1000 ML: 600; 310; 30; 20 INJECTION, SOLUTION INTRAVENOUS at 18:24

## 2021-04-22 RX ADMIN — MORPHINE SULFATE 4 MG: 4 INJECTION INTRAVENOUS at 18:24

## 2021-04-22 RX ADMIN — ONDANSETRON 4 MG: 2 INJECTION INTRAMUSCULAR; INTRAVENOUS at 18:24

## 2021-04-22 ASSESSMENT — ENCOUNTER SYMPTOMS
SORE THROAT: 1
VOMITING: 1
BLOOD IN STOOL: 0
NAUSEA: 1
ABDOMINAL PAIN: 1
COUGH: 0
DIARRHEA: 1
CONSTIPATION: 0
SHORTNESS OF BREATH: 0

## 2021-04-22 ASSESSMENT — PAIN DESCRIPTION - LOCATION: LOCATION: BACK

## 2021-04-22 ASSESSMENT — PAIN DESCRIPTION - ORIENTATION: ORIENTATION: RIGHT;LEFT;UPPER

## 2021-04-22 ASSESSMENT — PAIN DESCRIPTION - PAIN TYPE: TYPE: ACUTE PAIN

## 2021-04-22 NOTE — PROGRESS NOTES
2021    TELEHEALTH EVALUATION -- Audio/Visual (During IHDGQ-54 public health emergency)    HPI:    Ade Coley (:  1980) has requested an audio/video evaluation for the following concern(s):    Patient presents complaining of symptoms of nausea/vomiting (about 5 episodes daily) along with diarrhea (8 - 10 episodes daily) over the past 3 days. Pt also reports associated chills and back pain. Pt did get her second [address] shot on 04/15/2021 Pt also did eat crab legs at a restaurant the night before her symptoms began but her  also ate the same meal w/o any issues. Pt denies any new medications but did run out of her linzess samples about 1 week ago    Pt has a hx of psoriatic arthritis and is followed by Rheumatology and is on IV inflectra medication      Pt has a hx of Hidradenitis suppurativa since she was a teenager and is followed by [facility] and has had multiple surgeries (most recently 2020) along with an episode sepsis . 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 Pt is with good control on IV inflectra medication and does take bursts of prednisone as needed      Patient has a past medical history significant for HTN and is on norvasc 5 mg daily     Pt is on linzess and protonix for irritable bowel and was being followed by GI and takes miralax daily     Pt is on lexapro and buspar for anxiety/depression with good control. Pt is followed by Weight Management and is on adipex and ozempic and has lost 30 lbs     Pt has a hx of PCOS and endometriosis and is followed by OB/Gyne and is on mirena medication     Pt is a nonsmoker and denies alcohol use    Review of Systems   Constitutional: Positive for chills and fatigue. Negative for fever. HENT: Positive for sore throat (mild at times). Negative for nosebleeds. Eyes:        Negative blurred vision or diplopia   Respiratory: Negative for cough and shortness of breath. Cardiovascular: Positive for leg swelling (chronic and pt reports normal LE venous dopplers 2 years ago). Negative for chest pain and palpitations. Gastrointestinal: Positive for abdominal pain (crampy at times (chronic)), diarrhea, nausea and vomiting. Negative for blood in stool and constipation. Negative melena or indigestion   Endocrine: Negative for polydipsia and polyuria. Genitourinary: Negative for dysuria and hematuria. Musculoskeletal: Positive for arthralgias. Skin: Positive for rash (psoriatic at times). Neurological: Positive for dizziness. Negative for seizures, syncope, speech difficulty, weakness and headaches. Prior to Visit Medications    Medication Sig Taking?  Authorizing Provider   amLODIPine (NORVASC) 5 MG tablet Take 1 tablet by mouth daily Yes Jackie Caban MD   benzonatate (TESSALON) 100 MG capsule Take 100 mg by mouth 3 times daily as needed Yes Historical Provider, MD   benzoyl peroxide 5 % external liquid  Yes Historical Provider, MD   busPIRone (BUSPAR) 5 MG tablet TAKE ONE TABLET BY MOUTH TWICE A DAY Yes Historical Provider, MD   clindamycin (CLEOCIN T) 1 % external solution  Yes Historical Provider, MD   escitalopram (LEXAPRO) 20 MG tablet TAKE ONE TABLET BY MOUTH DAILY Yes Historical Provider, MD   Lancets (Barb Quirk) MISC Use to test blood glucose once daily Yes Historical Provider, MD   loratadine (CLARITIN) 10 MG tablet Take 10 mg by mouth daily Yes Historical Provider, MD   metroNIDAZOLE (METROCREAM) 0.75 % cream APPLY TO RASH ON FACE TWO TIMES A DAY Yes Historical Provider, MD   mupirocin (BACTROBAN) 2 % ointment APPLY TO OPEN SORES TWO TIMES A DAY Yes Historical Provider, MD   nystatin-triamcinolone (MYCOLOG II) 302695-0.1 UNIT/GM-% cream APPLY TOPICALLY THREE TIMES A DAY Yes Historical Provider, MD   ondansetron (ZOFRAN) 4 MG tablet Take 4 mg by mouth every 8 hours as needed Yes Historical Provider, MD   pantoprazole (PROTONIX) 40 MG tablet TAKE ONE TABLET BY MOUTH TWICE A DAY BEFORE MEALS Yes Historical Provider, MD   polyethylene glycol (GLYCOLAX) 17 GM/SCOOP powder Take 17 g by mouth daily Yes Historical Provider, MD   Semaglutide, 1 MG/DOSE, (OZEMPIC, 1 MG/DOSE,) 2 MG/1.5ML SOPN Inject 1 mg into the skin every 7 days Yes Historical Provider, MD   silver sulfADIAZINE (SILVADENE) 1 % cream Apply 1 squirt to wound daily. Continue until seen in follow-up with physician. Yes Historical Provider, MD   predniSONE (DELTASONE) 10 MG tablet Take 40 mg by mouth daily as needed Yes Historical Provider, MD   spironolactone (ALDACTONE) 100 MG tablet Take 100 mg by mouth 2 times daily Yes Historical Provider, MD   inFLIXimab (INFLECTRA) 100 MG SOLR Infuse intravenously every 28 days Yes Historical Provider, MD   UNABLE TO FIND Apply 2 % topically as needed tofacitinib 2% ointment Yes Historical Provider, MD   linaclotide (LINZESS) 145 MCG capsule Take 1 capsule by mouth 2 times daily Yes Neftali Arevalo MD       Social History     Tobacco Use    Smoking status: Never Smoker    Smokeless tobacco: Never Used   Substance Use Topics    Alcohol use: No    Drug use: No        Allergies   Allergen Reactions    Adhesive Tape      Skin reddens, use paper tape    Cinnamon Rash       PHYSICAL EXAMINATION:  [ INSTRUCTIONS:  \"[x]\" Indicates a positive item     Vital Signs: (As obtained by patient/caregiver or practitioner observation)    Blood pressure- 123/79 Heart rate- 87   Respiratory rate-    Temperature-  Pulse oximetry-     Constitutional: [x] Appears well-developed and well-nourished [x] No apparent distress      [] Abnormal-   Mental status  [x] Alert and awake  [] Oriented to person/place/time []Able to follow commands      Eyes:  EOM    []  Normal  [] Abnormal-  Sclera  [x]  Normal  [] Abnormal -         Discharge [x]  None visible  [] Abnormal -    HENT:   [] Normocephalic, atraumatic.   [x] Abnormal --- dry mucous membranes  [] Mouth/Throat: Mucous membranes are moist.     External Ears [] Normal  [] Abnormal-     Neck: [] No visualized mass     Pulmonary/Chest: [x] Respiratory effort normal.  [] No visualized signs of difficulty breathing or respiratory distress        [] Abnormal-      Musculoskeletal:   [] Normal gait with no signs of ataxia         [] Normal range of motion of neck        [] Abnormal-       Neurological:        [x] No Facial Asymmetry (Cranial nerve 7 motor function) (limited exam to video visit)          [] No gaze palsy        [] Abnormal-         Skin:        [x] No significant exanthematous lesions or discoloration noted on facial skin         [] Abnormal-            Psychiatric:       [x] Normal Affect [] No Hallucinations        [] Abnormal-     Other pertinent observable physical exam findings-     ASSESSMENT/PLAN:  1. Nausea and vomiting/Diarrhea  Pt reports persistent episodes over the past 3 days with associated chills and unsure as to etiology but DDx includes food poisoning, C diff infection and a SE of COVID vaccine. Pt does report dizziness and is with dry MM on PE and was told to go to the ER ASAP for further evaluation with STAT bloodwork and stool cultures along with treatment with IV fluids and zofran medication. VSS      Return in about 2 weeks (around 5/6/2021). Nishant Resendez, was evaluated through a synchronous (real-time) audio-video encounter. The patient (or guardian if applicable) is aware that this is a billable service. Verbal consent to proceed has been obtained within the past 12 months. The visit was conducted pursuant to the emergency declaration under the [address] [place] [address], [address] [place] [address] authority and the [employer] Act. Patient identification was verified, and a caregiver was present when appropriate. The patient was located in a state where the provider was credentialed to provide care.     Total time spent on this

## 2021-04-22 NOTE — ED PROVIDER NOTES
[address]          ATTENDING PHYSICIAN NOTE       Date of evaluation: 4/22/2021    Chief Complaint     Emesis (since Mon night) and Back Pain (upper under both shoulser blades)      History of Present Illness     Bren Simmons is a 36 y.o. female past medical history of hypertension, endometriosis, PCOS history of C. difficile in the past as well in 2017 that required a fecal transplant who presents today with approximately 3 days of nausea vomiting diarrhea intermittent low back pain. Here she is well-appearing in no acute distress states that she talk to with a virtual visit of her PCP she endorsed dehydration with this and was sent here for evaluation. She denies any recent antibiotic use has only had watery diarrhea for 3 days at this point. She denies any significant abdominal pain but does endorse upper lumbar back pain. She denies any chest pain shortness of breath fevers dysuria or any other symptoms with this. Review of Systems     Review of Systems  A full 10 point review of systems was obtained. Pertinent positives and negatives as documented in the HPI, otherwise all other systems were reviewed and were negative. Past Medical, Surgical, Family, and Social History     She has a past medical history of Endometriosis, Hypertension, Lumbar disc disorder, Nausea & vomiting, Polycystic ovarian syndrome, and Sleep apnea. She has a past surgical history that includes Tonsillectomy (1985); Ovary surgery; Cholecystectomy (2008); eye surgery (2010); Cardiac surgery; Tympanostomy tube placement; and other surgical history (01/21/14). Her family history includes Asthma in her brother; COPD in her father; Diabetes in her mother; Elevated Lipids in her sister; Hypertension in her mother; Kidney Disease in her mother. She reports that she has never smoked.  She has never used smokeless tobacco. She reports that she does not drink alcohol or use are clear, oropharynx is nonerythematous, mucus membranes are moist  Neck: Trachea midline  Chest: Nonlabored respirations, clear to auscultation bilaterally  Cardiovascular: Regular rate and rhythm, 2+ radial pulses bilaterally  Abdominal: Nondistended abdomen, soft, nontender separate very minimal tenderness in the epigastrium, without rebound or gaurding, mild tenderness palpation bilateral CVA and flanks  Skin: Warm, dry well perfused, no rashes  Extremities: no obvious deformities, no tenderness to palpation diffusely  Neurologic:  Alert and oriented, speech is clear and intact without dysarthria, gait is intact    Psychologic: appropriate mood and affect    Diagnostic Results     EKG   Normal sinus rhythm with a ventricular rate of 77 normal intervals normal axis, no signs of ischemia.      RADIOLOGY:  XR CHEST (2 VW)   Final Result      No acute pulmonary pathology                      LABS:   Results for orders placed or performed during the hospital encounter of 04/22/21   CBC Auto Differential   Result Value Ref Range    WBC 11.2 (H) 4.0 - 11.0 K/uL    RBC 4.35 4.00 - 5.20 M/uL    Hemoglobin 12.3 12.0 - 16.0 g/dL    Hematocrit 37.6 36.0 - 48.0 %    MCV 86.6 80.0 - 100.0 fL    MCH 28.3 26.0 - 34.0 pg    MCHC 32.8 31.0 - 36.0 g/dL    RDW 14.8 12.4 - 15.4 %    Platelets 714 651 - 013 K/uL    MPV 8.6 5.0 - 10.5 fL    Neutrophils % 58.3 %    Lymphocytes % 28.5 %    Monocytes % 9.9 %    Eosinophils % 2.8 %    Basophils % 0.5 %    Neutrophils Absolute 6.5 1.7 - 7.7 K/uL    Lymphocytes Absolute 3.2 1.0 - 5.1 K/uL    Monocytes Absolute 1.1 0.0 - 1.3 K/uL    Eosinophils Absolute 0.3 0.0 - 0.6 K/uL    Basophils Absolute 0.1 0.0 - 0.2 K/uL   Basic Metabolic Panel w/ Reflex to MG   Result Value Ref Range    Sodium 137 136 - 145 mmol/L    Potassium reflex Magnesium 3.8 3.5 - 5.1 mmol/L    Chloride 103 99 - 110 mmol/L    CO2 25 21 - 32 mmol/L    Anion Gap 9 3 - 16    Glucose 83 70 - 99 mg/dL    BUN 7 7 - 20 mg/dL CREATININE 0.9 0.6 - 1.1 mg/dL    GFR Non-African American >60 >60    GFR African American >60 >60    Calcium 9.1 8.3 - 10.6 mg/dL   Hepatic Function Panel   Result Value Ref Range    Total Protein 7.3 6.4 - 8.2 g/dL    Albumin 4.0 3.4 - 5.0 g/dL    Alkaline Phosphatase 90 40 - 129 U/L    ALT 19 10 - 40 U/L    AST 19 15 - 37 U/L    Total Bilirubin <0.2 0.0 - 1.0 mg/dL    Bilirubin, Direct <0.2 0.0 - 0.3 mg/dL    Bilirubin, Indirect see below 0.0 - 1.0 mg/dL   Lipase   Result Value Ref Range    Lipase 61.0 (H) 13.0 - 60.0 U/L   EKG 12 Lead   Result Value Ref Range    Ventricular Rate 77 BPM    Atrial Rate 77 BPM    P-R Interval 190 ms    QRS Duration 76 ms    Q-T Interval 410 ms    QTc Calculation (Bazett) 463 ms    P Axis 58 degrees    R Axis 37 degrees    T Axis 41 degrees    Diagnosis       EKG performed in ER and to be interpreted by ER physician. Confirmed by MD, ER ([id number]),  Mario Jay ([phone]) on 4/22/2021 7:09:52 PM       ED BEDSIDE ULTRASOUND:    RECENT VITALS:  BP: (!) 149/85,Temp: 98.4 °F (36.9 °C), Pulse: 80, Resp: (!) 116, SpO2: 96 %     Procedures       ED Course     Nursing Notes, Past Medical Hx, Past Surgical Hx, Social Hx,Allergies, and Family Hx were reviewed. patient was given the following medications:  Orders Placed This Encounter   Medications    lactated ringers infusion 1,000 mL    morphine injection 4 mg    ondansetron (ZOFRAN) injection 4 mg    ondansetron (ZOFRAN ODT) 4 MG disintegrating tablet     Sig: Take 1 tablet by mouth every 8 hours as needed for Nausea     Dispense:  20 tablet     Refill:  0       CONSULTS:  None    MEDICAL DECISIONMAKING / ASSESSMENT / Gabbie Saeed is a 36 y.o. female is well-appearing in no acute distress. Has a very remote history of C. difficile and presents today with nausea vomiting diarrhea. Incidentally she did receive her COVID-19 second vaccine Friday but symptoms started on Monday.   She has a soft and benign abdominal exam here endorses some mild epigastric tenderness and pain in the upper portion of the back. Denies chest pain shortness of breath or other symptoms. Laboratory evaluation showed white blood cell count of 11.2 otherwise basic metabolic panel hepatic function panel were unremarkable. Lipase is 61 chest x-ray unremarkable EKG unremarkable. She overall is very well-appearing in no acute distress symptoms completely resolved. We did order stool culture studies as well as C. Difficile, but she did not have any bowel movement or diarrhea in the ER so unable to obtain though she will get those outpatient through her primary care physician. Clinical Impression     1.  Non-intractable vomiting with nausea, unspecified vomiting type        Disposition     PATIENT REFERRED TO:  Austin Mason MD  [address] [place]  [phone]    Schedule an appointment as soon as possible for a visit in 3 days        DISCHARGE MEDICATIONS:  New Prescriptions    ONDANSETRON (ZOFRAN ODT) 4 MG DISINTEGRATING TABLET    Take 1 tablet by mouth every 8 hours as needed for Nausea       DISPOSITION         Janan Meckel, MD  04/22/21 9568

## 2021-04-22 NOTE — ED NOTES
Bed: [facility]  Expected date:   Expected time:   Means of arrival:   Comments:  [address], RN  04/22/21 9216

## 2021-04-22 NOTE — ED NOTES
Pt came into the ED with back pain, vomiting and chills that started Monday night. Hx of C-Diff and Sepsis. Denies CP, SOB, fever.      Haroldo Dias RN  04/22/21 7258

## 2021-04-23 NOTE — ED NOTES
.Patient prepared for and ready to be discharged. Patient discharged at this time in no acute distress after verbalizing understanding of discharge instructions. Patient left after receiving After Visit Summary instructions.       Joellen Ortega RN  04/22/21 8313

## 2021-05-06 ENCOUNTER — OFFICE VISIT (OUTPATIENT)
Dept: ORTHOPEDIC SURGERY | Age: 41
End: 2021-05-06
Payer: MEDICAID

## 2021-05-06 VITALS — WEIGHT: 263 LBS | BODY MASS INDEX: 44.9 KG/M2 | HEIGHT: 64 IN

## 2021-05-06 DIAGNOSIS — M77.52 BURSITIS OF LEFT FOOT: Primary | ICD-10-CM

## 2021-05-06 PROCEDURE — G8417 CALC BMI ABV UP PARAM F/U: HCPCS | Performed by: ORTHOPAEDIC SURGERY

## 2021-05-06 PROCEDURE — G8427 DOCREV CUR MEDS BY ELIG CLIN: HCPCS | Performed by: ORTHOPAEDIC SURGERY

## 2021-05-06 PROCEDURE — 99213 OFFICE O/P EST LOW 20 MIN: CPT | Performed by: ORTHOPAEDIC SURGERY

## 2021-05-06 PROCEDURE — 20600 DRAIN/INJ JOINT/BURSA W/O US: CPT | Performed by: ORTHOPAEDIC SURGERY

## 2021-05-06 PROCEDURE — 1036F TOBACCO NON-USER: CPT | Performed by: ORTHOPAEDIC SURGERY

## 2021-05-06 RX ORDER — SPIRONOLACTONE 100 MG/1
100 TABLET, FILM COATED ORAL
COMMUNITY
Start: 2021-05-06

## 2021-05-06 RX ORDER — METHYLPREDNISOLONE ACETATE 40 MG/ML
40 INJECTION, SUSPENSION INTRA-ARTICULAR; INTRALESIONAL; INTRAMUSCULAR; SOFT TISSUE ONCE
Status: COMPLETED | OUTPATIENT
Start: 2021-05-06 | End: 2021-05-06

## 2021-05-06 RX ORDER — LIDOCAINE HYDROCHLORIDE 10 MG/ML
2 INJECTION, SOLUTION INFILTRATION; PERINEURAL ONCE
Status: COMPLETED | OUTPATIENT
Start: 2021-05-06 | End: 2021-05-06

## 2021-05-06 RX ADMIN — LIDOCAINE HYDROCHLORIDE 2 ML: 10 INJECTION, SOLUTION INFILTRATION; PERINEURAL at 16:49

## 2021-05-06 RX ADMIN — METHYLPREDNISOLONE ACETATE 40 MG: 40 INJECTION, SUSPENSION INTRA-ARTICULAR; INTRALESIONAL; INTRAMUSCULAR; SOFT TISSUE at 16:50

## 2021-05-06 NOTE — PATIENT INSTRUCTIONS
Joint Injection After [address] [facility]  Denae Gtz MD    Refer to this sheet in the next few days. These insructions provide you with information on caring for yourself after you have had a joint injection. Your caregiver also may give you more specific instructions. Your treatment has been planned according to current medical practices, but problems sometimes occur. Call your caregiver if you have any problems or questions after your procedure. After any type of joint injection, it is not uncommon to experience:     A temporary increase in pain which should resolve within 2-3 days   Soreness, swelling, or bruising around the injection site   Mild numbness, tingling, or weakness around the injection site caused by the numbing medicine used before or with the injection    It is also possible to experience the following effects associated with the specific agent after injection:     Corticosteroids (these effects are rare):  o Allergic reaction  o Increased blood sugar levels (if you have diabetes and you notice that your blood sugar levels have increased, notify your caregiver)  o Increased blood pressure levels  o Mood swings   Hyaluronic acid in the use of viscosupplementation  o Temporary heat or redness  o Temporary rash and itching  o Increased fluid accumulation in the injected join    These effects all should resolve within a day or two after your procedure. HOME CARE INSTRUCTIONS     Limit yourself to light activity the day of your procedure. Avoid lifting heavy objections, bending, stooping or twisting   Take prescription or over-the-counter pain medication as directed by your caregiver   You may apply ice to your injection site to reduce pain and swelling the day of your procedure.  Ice may be applied 3-4 times:  o Put ice in a plastic bag  o Place a towel between your skin and the bag  o Leave the ice on for no longer than 15-20 minutes each time    FOLLOW-UP INSTRUCTIONS    Please make sure you keep your follow-up appointment as indicated by your physician.

## 2021-05-06 NOTE — PROGRESS NOTES
[address] [facility]  Outpatient Progress Note  Kin Ruiz MD    Patient Name: Jericho Sanders MRN: [id number]   Age: 36 y.o. YOB: 1980   Sex: female      [address] Complaint   Patient presents with    Follow-up     LEFT FOOT FOLLOW UP MRI RESULTS       HISTORY OF PRESENT ILLNESS   Jericho Sanders is a 36 y.o. female   The patient presents for a follow up visit:  She presents for follow-up after the MRI scan of her left foot. She continues to have tenderness under the fifth metatarsal head. Pain worse with weightbearing. She is better in shoes and worse in bare feet.     PAST MEDICAL HISTORY      Past Medical History:   Diagnosis Date    Endometriosis     Hypertension     Lumbar disc disorder     Nausea & vomiting     Polycystic ovarian syndrome     Sleep apnea 1/22/2014       PAST SURGICAL HISTORY     Past Surgical History:   Procedure Laterality Date    CARDIAC SURGERY      cardia cath-no blockage    CHOLECYSTECTOMY  2008    EYE SURGERY  2010    detached retina    OTHER SURGICAL HISTORY  01/21/14    L4-5 BILATERAL LAMINECTOMY AND DISCECTOMY    OVARY SURGERY      multiple times    TONSILLECTOMY  1985    TYMPANOSTOMY TUBE PLACEMENT         MEDICATIONS     Current Outpatient Medications   Medication Sig Dispense Refill    spironolactone (ALDACTONE) 100 MG tablet Take 100 mg by mouth      ondansetron (ZOFRAN ODT) 4 MG disintegrating tablet Take 1 tablet by mouth every 8 hours as needed for Nausea 20 tablet 0    amLODIPine (NORVASC) 5 MG tablet Take 1 tablet by mouth daily 30 tablet 2    benzonatate (TESSALON) 100 MG capsule Take 100 mg by mouth 3 times daily as needed      benzoyl peroxide 5 % external liquid       busPIRone (BUSPAR) 5 MG tablet TAKE ONE TABLET BY MOUTH TWICE A DAY      clindamycin (CLEOCIN T) 1 % external solution       escitalopram (LEXAPRO) 20 MG tablet TAKE ONE TABLET BY MOUTH DAILY      Lancets (Carry [place]ine PLUS WHARGU04C) MISC Use to test blood glucose once daily      loratadine (CLARITIN) 10 MG tablet Take 10 mg by mouth daily      metroNIDAZOLE (METROCREAM) 0.75 % cream APPLY TO RASH ON FACE TWO TIMES A DAY      mupirocin (BACTROBAN) 2 % ointment APPLY TO OPEN SORES TWO TIMES A DAY      nystatin-triamcinolone (MYCOLOG II) 467313-7.1 UNIT/GM-% cream APPLY TOPICALLY THREE TIMES A DAY      ondansetron (ZOFRAN) 4 MG tablet Take 4 mg by mouth every 8 hours as needed      pantoprazole (PROTONIX) 40 MG tablet TAKE ONE TABLET BY MOUTH TWICE A DAY BEFORE MEALS      polyethylene glycol (GLYCOLAX) 17 GM/SCOOP powder Take 17 g by mouth daily      Semaglutide, 1 MG/DOSE, (OZEMPIC, 1 MG/DOSE,) 2 MG/1.5ML SOPN Inject 1 mg into the skin every 7 days      silver sulfADIAZINE (SILVADENE) 1 % cream Apply 1 squirt to wound daily. Continue until seen in follow-up with physician.  predniSONE (DELTASONE) 10 MG tablet Take 40 mg by mouth daily as needed      spironolactone (ALDACTONE) 100 MG tablet Take 100 mg by mouth 2 times daily      inFLIXimab (INFLECTRA) 100 MG SOLR Infuse intravenously every 28 days      linaclotide (LINZESS) 145 MCG capsule Take 1 capsule by mouth 2 times daily 60 capsule 1     No current facility-administered medications for this visit.         ALLERGIES     Allergies   Allergen Reactions    Cefazolin Anaphylaxis    Adhesive Tape      Skin reddens, use paper tape    Cinnamon Rash       FAMILY HISTORY     Family History   Problem Relation Age of Onset    Kidney Disease Mother         CKD    Hypertension Mother     Diabetes Mother     COPD Father     Asthma Brother     Elevated Lipids Sister         hypertriglyceridemia       SOCIAL HISTORY     Social History     Socioeconomic History    Marital status:      Spouse name: Not on file    Number of children: 0    Years of education: Not on file    Highest education level: Not on file   Occupational History    Occupation:  45.14 kg/m²     General appearance: healthy, alert, no distress  Skin: Skin color, texture, turgor normal. No rashes or lesions  HEENT: atraumatic, normocephalic. PERRL  Respiratory: Unlabored breathing  Lymphatic: No adenopathy   Neuro: Alert and oriented, normal distal sensation, normal bilateral DTRs  Vascular: Normal distal capillary and distal pulses  Muskuloskeletal Exam:   Left foot demonstrates tenderness under the fifth metatarsal head. RADIOLOGY   X-rays obtained and reviewed in office:  Views MRI scan of the left foot    Impression: No evidence of stress fracture or stress reaction in the fifth metatarsal head. There is an inflamed bursa/callus under the fifth metatarsal head. There is some mention of atrophy in the abductor digiti quinti suggesting Hill's nerve compression    IMPRESSION     1. Bursitis of left foot           PLAN   Patient recently had an EMG of the left lower extremity which revealed no evidence of peripheral nerve entrapment or lumbar radiculopathy. Not sure the significance of the muscular atrophy in the foot but the inflammation under the fifth metatarsal head correlates with her maximum area of tenderness. Plan today is to proceed with a steroid injection into that bursal space under the fifth metatarsal head. I would recommend a custom orthotic with relief under the fifth metatarsal head to prevent recurrence. PROCEDURE     General Injection:  The patient consented to the procedure and a time out was performed before proceeding. The skin was prepped in a sterile fashion. A 25 gauge needle was introduced into the joint space. The patients bursa under the fifth metatarsal head was injected using 2 mL of 1% Lidocaine and 1 mL of 40 mg/mL Depo-Medrol was injected. The injection flowed freely and the patient tolerated the procedure well. Post injection expectations were reviewed with the patient. FOLLOWUP     No follow-ups on file.     No orders of the defined

## 2021-05-26 ENCOUNTER — OFFICE VISIT (OUTPATIENT)
Dept: PRIMARY CARE CLINIC | Age: 41
End: 2021-05-26
Payer: MEDICAID

## 2021-05-26 ENCOUNTER — TELEPHONE (OUTPATIENT)
Dept: PRIMARY CARE CLINIC | Age: 41
End: 2021-05-26

## 2021-05-26 VITALS
SYSTOLIC BLOOD PRESSURE: 130 MMHG | DIASTOLIC BLOOD PRESSURE: 88 MMHG | HEART RATE: 96 BPM | BODY MASS INDEX: 44.63 KG/M2 | OXYGEN SATURATION: 98 % | WEIGHT: 260 LBS

## 2021-05-26 DIAGNOSIS — N30.00 ACUTE CYSTITIS WITHOUT HEMATURIA: Primary | ICD-10-CM

## 2021-05-26 DIAGNOSIS — N30.00 ACUTE CYSTITIS WITHOUT HEMATURIA: ICD-10-CM

## 2021-05-26 LAB
BILIRUBIN URINE: NEGATIVE
BLOOD, URINE: NEGATIVE
CLARITY: CLEAR
COLOR: YELLOW
GLUCOSE URINE: NEGATIVE MG/DL
KETONES, URINE: NEGATIVE MG/DL
LEUKOCYTE ESTERASE, URINE: NEGATIVE
MICROSCOPIC EXAMINATION: NORMAL
NITRITE, URINE: NEGATIVE
PH UA: 6.5 (ref 5–8)
PROTEIN UA: NEGATIVE MG/DL
SPECIFIC GRAVITY UA: 1.01 (ref 1–1.03)
URINE REFLEX TO CULTURE: NORMAL
URINE TYPE: NORMAL
UROBILINOGEN, URINE: 0.2 E.U./DL

## 2021-05-26 PROCEDURE — G8427 DOCREV CUR MEDS BY ELIG CLIN: HCPCS | Performed by: FAMILY MEDICINE

## 2021-05-26 PROCEDURE — G8417 CALC BMI ABV UP PARAM F/U: HCPCS | Performed by: FAMILY MEDICINE

## 2021-05-26 PROCEDURE — 99213 OFFICE O/P EST LOW 20 MIN: CPT | Performed by: FAMILY MEDICINE

## 2021-05-26 PROCEDURE — 1036F TOBACCO NON-USER: CPT | Performed by: FAMILY MEDICINE

## 2021-05-26 RX ORDER — CIPROFLOXACIN 500 MG/1
500 TABLET, FILM COATED ORAL 2 TIMES DAILY
Qty: 14 TABLET | Refills: 0 | Status: SHIPPED | OUTPATIENT
Start: 2021-05-26 | End: 2021-06-02

## 2021-05-26 ASSESSMENT — ENCOUNTER SYMPTOMS
COUGH: 0
ABDOMINAL PAIN: 1
NAUSEA: 0
BACK PAIN: 1
VOMITING: 0
SHORTNESS OF BREATH: 0
BLOOD IN STOOL: 0
DIARRHEA: 0
SORE THROAT: 0
EYE DISCHARGE: 0
EYE REDNESS: 0

## 2021-05-26 NOTE — TELEPHONE ENCOUNTER
Patient called to report UTI symptom onset has been constant for a time period of 1 week. Severity is described as moderate to severe. Rheumatologist recommended at infusion tx to notify PCP. Please review and advise.

## 2021-05-26 NOTE — PATIENT INSTRUCTIONS
Patient Education      Go to the ER ASAP if you develop any fever/chills or nausea/vomiting! Urinary Tract Infection (UTI) in Women: Care Instructions  Overview     A urinary tract infection, or UTI, is a general term for an infection anywhere between the kidneys and the urethra (where urine comes out). Most UTIs are bladder infections. They often cause pain or burning when you urinate. UTIs are caused by bacteria and can be cured with antibiotics. Be sure to complete your treatment so that the infection does not get worse. Follow-up care is a key part of your treatment and safety. Be sure to make and go to all appointments, and call your doctor if you are having problems. It's also a good idea to know your test results and keep a list of the medicines you take. How can you care for yourself at home? · Take your antibiotics as directed. Do not stop taking them just because you feel better. You need to take the full course of antibiotics. · Drink extra water and other fluids for the next day or two. This will help make the urine less concentrated and help wash out the bacteria that are causing the infection. (If you have kidney, heart, or liver disease and have to limit fluids, talk with your doctor before you increase the amount of fluids you drink.)  · Avoid drinks that are carbonated or have caffeine. They can irritate the bladder. · Urinate often. Try to empty your bladder each time. · To relieve pain, take a hot bath or lay a heating pad set on low over your lower belly or genital area. Never go to sleep with a heating pad in place. To prevent UTIs  · Drink plenty of water each day. This helps you urinate often, which clears bacteria from your system. (If you have kidney, heart, or liver disease and have to limit fluids, talk with your doctor before you increase the amount of fluids you drink.)  · Urinate when you need to. · If you are sexually active, urinate right after you have sex.   · Change sanitary pads often. · Avoid douches, bubble baths, feminine hygiene sprays, and other feminine hygiene products that have deodorants. · After going to the bathroom, wipe from front to back. When should you call for help? Call your doctor now or seek immediate medical care if:    · Symptoms such as fever, chills, nausea, or vomiting get worse or appear for the first time.     · You have new pain in your back just below your rib cage. This is called flank pain.     · There is new blood or pus in your urine.     · You have any problems with your antibiotic medicine. Watch closely for changes in your health, and be sure to contact your doctor if:    · You are not getting better after taking an antibiotic for 2 days.     · Your symptoms go away but then come back. Where can you learn more? Go to https://[employer] Servicespechristinaeweb.[email or web address]. org and sign in to your [employer] account. Enter A031 in the [employer] box to learn more about \"Urinary Tract Infection (UTI) in Women: Care Instructions. \"     If you do not have an account, please click on the \"Sign Up Now\" link. Current as of: June 29, 2020               Content Version: 12.8  © 2006-2021 Healthwise, [facility]. Care instructions adapted under license by [facility] ([facility]). If you have questions about a medical condition or this instruction, always ask your healthcare professional. [place] [address] any warranty or liability for your use of this information.

## 2021-05-26 NOTE — PROGRESS NOTES
(chronic and pt reports normal LE venous dopplers 2 years ago). Negative for chest pain and palpitations. Gastrointestinal: Positive for abdominal pain (crampy at times (chronic)). Negative for blood in stool, diarrhea, nausea and vomiting. Negative melena or indigestion   Genitourinary: Positive for frequency. Negative for dysuria and hematuria. Musculoskeletal: Positive for arthralgias and back pain (thoracic and B/L flank areas). Skin: Positive for rash (psoriatic at times and recent flareup of her hidradenitis suppurativa in her groin area). Neurological: Negative for dizziness, seizures, syncope, speech difficulty, weakness and headaches. Psychiatric/Behavioral: Negative for dysphoric mood. The patient is nervous/anxious (at times). Vitals:    05/26/21 1429   BP: 130/88   Pulse: 96   SpO2: 98%         Physical Exam  Vitals reviewed. Constitutional:       General: She is not in acute distress. Appearance: She is obese. HENT:      Mouth/Throat:      Mouth: Mucous membranes are moist.      Pharynx: Oropharynx is clear. Eyes:      General: No scleral icterus. Comments: Pink conjunctivae    Neck:      Thyroid: No thyromegaly. Cardiovascular:      Heart sounds: Normal heart sounds. No murmur heard. No friction rub. No gallop. Pulmonary:      Effort: Pulmonary effort is normal.      Breath sounds: Normal breath sounds. Abdominal:      Palpations: Abdomen is soft. Tenderness: There is abdominal tenderness (mild suprapubic and midepigastric). There is no guarding or rebound. Genitourinary:     Comments: Positive mild CVA tendernous to percussion noted B/L  Musculoskeletal:      Comments: Positive  2-3+ leg edema noted B/L   Lymphadenopathy:      Cervical: No cervical adenopathy. Skin:     Findings: No rash. Neurological:      Mental Status: She is alert. Psychiatric:         Mood and Affect: Mood normal.         ASSESSMENT AND PLAN    1.  Acute cystitis without hematuria  Will check UA and culture and treat pt empirically with cipro antibiotic and pt was told to increase her intake of water and cranberry juice. Pt is with mild B/L CVA tendernous to percussion on PE but denies any associated fever/chills or nausea/vomiting. Patient was told to go to the ER ASAP if you note no improvement in 2 -3 days  - ciprofloxacin (CIPRO) 500 MG tablet; Take 1 tablet by mouth 2 times daily for 7 days  Dispense: 14 tablet; Refill: 0  - Urinalysis Reflex to Culture; Future      Flor Chavez MD      Return in about 2 months (around 7/26/2021). Patient Instructions       Patient Education      Go to the ER ASAP if you develop any fever/chills or nausea/vomiting! Urinary Tract Infection (UTI) in Women: Care Instructions  Overview     A urinary tract infection, or UTI, is a general term for an infection anywhere between the kidneys and the urethra (where urine comes out). Most UTIs are bladder infections. They often cause pain or burning when you urinate. UTIs are caused by bacteria and can be cured with antibiotics. Be sure to complete your treatment so that the infection does not get worse. Follow-up care is a key part of your treatment and safety. Be sure to make and go to all appointments, and call your doctor if you are having problems. It's also a good idea to know your test results and keep a list of the medicines you take. How can you care for yourself at home? · Take your antibiotics as directed. Do not stop taking them just because you feel better. You need to take the full course of antibiotics. · Drink extra water and other fluids for the next day or two. This will help make the urine less concentrated and help wash out the bacteria that are causing the infection.  (If you have kidney, heart, or liver disease and have to limit fluids, talk with your doctor before you increase the amount of fluids you drink.)  · Avoid drinks that are carbonated or have caffeine. They can irritate the bladder. · Urinate often. Try to empty your bladder each time. · To relieve pain, take a hot bath or lay a heating pad set on low over your lower belly or genital area. Never go to sleep with a heating pad in place. To prevent UTIs  · Drink plenty of water each day. This helps you urinate often, which clears bacteria from your system. (If you have kidney, heart, or liver disease and have to limit fluids, talk with your doctor before you increase the amount of fluids you drink.)  · Urinate when you need to. · If you are sexually active, urinate right after you have sex. · Change sanitary pads often. · Avoid douches, bubble baths, feminine hygiene sprays, and other feminine hygiene products that have deodorants. · After going to the bathroom, wipe from front to back. When should you call for help? Call your doctor now or seek immediate medical care if:    · Symptoms such as fever, chills, nausea, or vomiting get worse or appear for the first time.     · You have new pain in your back just below your rib cage. This is called flank pain.     · There is new blood or pus in your urine.     · You have any problems with your antibiotic medicine. Watch closely for changes in your health, and be sure to contact your doctor if:    · You are not getting better after taking an antibiotic for 2 days.     · Your symptoms go away but then come back. Where can you learn more? Go to https://InstamediavannaYippy.healthPredect. org and sign in to your [employer] account. Enter Y132 in the [facility] box to learn more about \"Urinary Tract Infection (UTI) in Women: Care Instructions. \"     If you do not have an account, please click on the \"Sign Up Now\" link. Current as of: June 29, 2020               Content Version: 12.8  © 8430-2094 Healthwise, [employer]. Care instructions adapted under license by [facility] ([facility]).  If you have questions about a medical condition or this instruction, always ask your healthcare professional. [place] [address] any warranty or liability for your use of this information.

## 2021-06-01 ENCOUNTER — TELEPHONE (OUTPATIENT)
Dept: PRIMARY CARE CLINIC | Age: 41
End: 2021-06-01

## 2021-06-02 ENCOUNTER — TELEPHONE (OUTPATIENT)
Dept: PRIMARY CARE CLINIC | Age: 41
End: 2021-06-02

## 2021-06-02 DIAGNOSIS — G47.30 SLEEP APNEA, UNSPECIFIED TYPE: Primary | ICD-10-CM

## 2021-06-29 ENCOUNTER — OFFICE VISIT (OUTPATIENT)
Dept: ORTHOPEDIC SURGERY | Age: 41
End: 2021-06-29
Payer: MEDICAID

## 2021-06-29 DIAGNOSIS — M77.52 BURSITIS OF LEFT FOOT: Primary | ICD-10-CM

## 2021-06-29 PROCEDURE — 1036F TOBACCO NON-USER: CPT | Performed by: ORTHOPAEDIC SURGERY

## 2021-06-29 PROCEDURE — 99213 OFFICE O/P EST LOW 20 MIN: CPT | Performed by: ORTHOPAEDIC SURGERY

## 2021-06-29 PROCEDURE — G8417 CALC BMI ABV UP PARAM F/U: HCPCS | Performed by: ORTHOPAEDIC SURGERY

## 2021-06-29 PROCEDURE — G8427 DOCREV CUR MEDS BY ELIG CLIN: HCPCS | Performed by: ORTHOPAEDIC SURGERY

## 2021-06-29 NOTE — PROGRESS NOTES
[facility]  Outpatient Progress Note  Chrissie Pete MD    Patient Name: Fermin Nguyễn MRN: [id number]   Age: 36 y.o. YOB: 1980   Sex: female      [address] Complaint   Patient presents with    Follow-up     Left foot follow up 5th mt injection bursitis doing well symptoms manageable       HISTORY OF PRESENT ILLNESS   Fermin Nguyễn is a 36 y.o. female   The patient presents for a follow up visit:  She reports that the pain under her left fifth metatarsal hold improved after the injection of steroid 2 weeks ago. She did have her orthotics made and as long as she has her shoes and orthotics were a memory foam slipper or other protective shoe wear she has no pain with ambulation and she has resumed all of her normal activities.     PAST MEDICAL HISTORY      Past Medical History:   Diagnosis Date    Endometriosis     Hypertension     Lumbar disc disorder     Nausea & vomiting     Polycystic ovarian syndrome     Sleep apnea 1/22/2014       PAST SURGICAL HISTORY     Past Surgical History:   Procedure Laterality Date    CARDIAC SURGERY      cardia cath-no blockage    CHOLECYSTECTOMY  2008    EYE SURGERY  2010    detached retina    OTHER SURGICAL HISTORY  01/21/14    L4-5 BILATERAL LAMINECTOMY AND DISCECTOMY    OVARY SURGERY      multiple times    TONSILLECTOMY  1985    TYMPANOSTOMY TUBE PLACEMENT         MEDICATIONS     Current Outpatient Medications   Medication Sig Dispense Refill    spironolactone (ALDACTONE) 100 MG tablet Take 100 mg by mouth      ondansetron (ZOFRAN ODT) 4 MG disintegrating tablet Take 1 tablet by mouth every 8 hours as needed for Nausea 20 tablet 0    amLODIPine (NORVASC) 5 MG tablet Take 1 tablet by mouth daily 30 tablet 2    benzonatate (TESSALON) 100 MG capsule Take 100 mg by mouth 3 times daily as needed      benzoyl peroxide 5 % external liquid       busPIRone (BUSPAR) 5 MG tablet TAKE ONE TABLET BY MOUTH TWICE A DAY      clindamycin (CLEOCIN T) 1 % external solution       escitalopram (LEXAPRO) 20 MG tablet TAKE ONE TABLET BY MOUTH DAILY      Lancets (ONETOUCH DELICA PLUS [id number]) MISC Use to test blood glucose once daily      loratadine (CLARITIN) 10 MG tablet Take 10 mg by mouth daily      metroNIDAZOLE (METROCREAM) 0.75 % cream APPLY TO RASH ON FACE TWO TIMES A DAY      mupirocin (BACTROBAN) 2 % ointment APPLY TO OPEN SORES TWO TIMES A DAY      nystatin-triamcinolone (MYCOLOG II) 575802-3.1 UNIT/GM-% cream APPLY TOPICALLY THREE TIMES A DAY      ondansetron (ZOFRAN) 4 MG tablet Take 4 mg by mouth every 8 hours as needed      pantoprazole (PROTONIX) 40 MG tablet TAKE ONE TABLET BY MOUTH TWICE A DAY BEFORE MEALS      polyethylene glycol (GLYCOLAX) 17 GM/SCOOP powder Take 17 g by mouth daily      Semaglutide, 1 MG/DOSE, (OZEMPIC, 1 MG/DOSE,) 2 MG/1.5ML SOPN Inject 1 mg into the skin every 7 days      silver sulfADIAZINE (SILVADENE) 1 % cream Apply 1 squirt to wound daily. Continue until seen in follow-up with physician.  predniSONE (DELTASONE) 10 MG tablet Take 40 mg by mouth daily as needed      spironolactone (ALDACTONE) 100 MG tablet Take 100 mg by mouth 2 times daily      inFLIXimab (INFLECTRA) 100 MG SOLR Infuse intravenously every 28 days      linaclotide (LINZESS) 145 MCG capsule Take 1 capsule by mouth 2 times daily 60 capsule 1     No current facility-administered medications for this visit.        ALLERGIES     Allergies   Allergen Reactions    Cefazolin Anaphylaxis    Adhesive Tape      Skin reddens, use paper tape    Cinnamon Rash       FAMILY HISTORY     Family History   Problem Relation Age of Onset    Kidney Disease Mother         CKD    Hypertension Mother     Diabetes Mother     COPD Father     Asthma Brother     Elevated Lipids Sister         hypertriglyceridemia       SOCIAL HISTORY     Social History     Socioeconomic History    Marital status:      Spouse name: Not on file    Number of children: 0    Years of education: Not on file    Highest education level: Not on file   Occupational History    Occupation:    Tobacco Use    Smoking status: Never Smoker    Smokeless tobacco: Never Used   Substance and Sexual Activity    Alcohol use: No    Drug use: No    Sexual activity: Not on file   Other Topics Concern    Not on file   Social History Narrative    Not on file     Social Determinants of Health     Financial Resource Strain: Low Risk     Difficulty of Paying Living Expenses: Not hard at all   Food Insecurity: No Food Insecurity    Worried About [address] in the Last Year: Never true    [address] [facility] in the Last Year: Never true   Transportation Needs: No Transportation Needs    Lack of Transportation (Medical): No    Lack of Transportation (Non-Medical):  No   Physical Activity:     Days of Exercise per Week:     Minutes of Exercise per Session:    Stress:     Feeling of Stress :    Social Connections:     Frequency of Communication with Friends and Family:     Frequency of Social Gatherings with Friends and Family:     Attends [employer] Services:     Active Member of Clubs or Organizations:     Attends Club or Organization Meetings:     Marital Status:    Intimate Partner Violence:     Fear of Current or Ex-Partner:     Emotionally Abused:     Physically Abused:     Sexually Abused:        REVIEW OF SYSTEMS   General: no fever, chills, night sweats, anorexia, malaise, fatigue, or weight change  Hematologic:  no unexplained bleeding or bruising  HEENT:   no nasal congestion, rhinorrhea, sore throat, or facial pain  Respiratory:  no cough, dyspnea, or chest pain  Cardiovascular:  no angina, ROBERSON, PND, orthopnea, dependent edema, or palpitations  Gastrointestinal:  no nausea, vomiting, diarrhea, constipation, or abdominal pain  Genitourinary:  no urinary urgency, frequency, dysuria, or hematuria  Musculoskeletal: see HPI  Endocrine:  no heat or cold intolerance and no polyphagia, polydipsia, or polyuria  Skin:  no skin eruptions or changing lesions  Neurologic:  no focal weakness, numbness/tingling, tremor, or severe headache. See HPI. See HPI for pertinent positives. PHYSICAL EXAM   Vital Signs: There were no vitals taken for this visit. General appearance: healthy, alert, no distress  Skin: Skin color, texture, turgor normal. No rashes or lesions  HEENT: atraumatic, normocephalic. PERRL  Respiratory: Unlabored breathing  Lymphatic: No adenopathy   Neuro: Alert and oriented, normal distal sensation, normal bilateral DTRs  Vascular: Normal distal capillary and distal pulses  Muskuloskeletal Exam:   Left foot has no tenderness under the fifth metatarsal head. The remainder the examination is unremarkable      IMPRESSION     1. Bursitis of left foot           PLAN   She is symptomatically improved after the injection and with custom orthotics. Recommend she continue with protective shoe wear in the orthotics and increase activities as tolerated    FOLLOWUP     Return if symptoms worsen or fail to improve.       Patient was instructed on appropriate use of braces, participation in home exercise programs, healthy lifestyle choices and weight loss as appropriate     Daisy Adhikari MD

## 2021-07-08 LAB — PAP SMEAR, EXTERNAL: NORMAL

## 2021-08-09 ENCOUNTER — OFFICE VISIT (OUTPATIENT)
Dept: PRIMARY CARE CLINIC | Age: 41
End: 2021-08-09
Payer: MEDICAID

## 2021-08-09 VITALS
BODY MASS INDEX: 45.75 KG/M2 | HEART RATE: 96 BPM | WEIGHT: 268 LBS | SYSTOLIC BLOOD PRESSURE: 114 MMHG | DIASTOLIC BLOOD PRESSURE: 78 MMHG | HEIGHT: 64 IN | TEMPERATURE: 98.2 F | RESPIRATION RATE: 20 BRPM | OXYGEN SATURATION: 98 %

## 2021-08-09 DIAGNOSIS — I10 ESSENTIAL HYPERTENSION: ICD-10-CM

## 2021-08-09 DIAGNOSIS — K58.1 IRRITABLE BOWEL SYNDROME WITH CONSTIPATION: Primary | ICD-10-CM

## 2021-08-09 DIAGNOSIS — F41.9 ANXIETY AND DEPRESSION: ICD-10-CM

## 2021-08-09 DIAGNOSIS — N80.9 ENDOMETRIOSIS: ICD-10-CM

## 2021-08-09 DIAGNOSIS — F32.A ANXIETY AND DEPRESSION: ICD-10-CM

## 2021-08-09 DIAGNOSIS — L73.2 HIDRADENITIS SUPPURATIVA: ICD-10-CM

## 2021-08-09 PROCEDURE — 1036F TOBACCO NON-USER: CPT | Performed by: FAMILY MEDICINE

## 2021-08-09 PROCEDURE — G8417 CALC BMI ABV UP PARAM F/U: HCPCS | Performed by: FAMILY MEDICINE

## 2021-08-09 PROCEDURE — G8427 DOCREV CUR MEDS BY ELIG CLIN: HCPCS | Performed by: FAMILY MEDICINE

## 2021-08-09 PROCEDURE — 99214 OFFICE O/P EST MOD 30 MIN: CPT | Performed by: FAMILY MEDICINE

## 2021-08-09 ASSESSMENT — ENCOUNTER SYMPTOMS
EYE DISCHARGE: 0
SORE THROAT: 0
SHORTNESS OF BREATH: 0
BACK PAIN: 0
DIARRHEA: 0
ABDOMINAL PAIN: 1
BLOOD IN STOOL: 0
COUGH: 0
NAUSEA: 0
EYE REDNESS: 0
VOMITING: 0

## 2021-08-09 NOTE — PROGRESS NOTES
dopplers 2 years ago). Negative for chest pain and palpitations. Gastrointestinal: Positive for abdominal pain (crampy at times (chronic)). Negative for blood in stool, diarrhea, nausea and vomiting. Negative melena or indigestion   Endocrine: Negative for polyuria. Genitourinary: Negative for dysuria and hematuria. Musculoskeletal: Positive for arthralgias. Negative for back pain. Skin: Positive for rash (psoriatic at times and recent flareup of her hidradenitis suppurativa in her L axilla and R groin area). Neurological: Negative for dizziness, seizures, syncope, speech difficulty, weakness and headaches. Psychiatric/Behavioral: Negative for dysphoric mood. The patient is not nervous/anxious. Vitals:    08/09/21 1257   BP: 114/78   Pulse: 96   Resp: 20   Temp: 98.2 °F (36.8 °C)   SpO2: 98%         Physical Exam  Vitals reviewed. Constitutional:       General: She is not in acute distress. Appearance: She is obese. HENT:      Mouth/Throat:      Mouth: Mucous membranes are moist.      Pharynx: Oropharynx is clear. Eyes:      General: No scleral icterus. Comments: Pink conjunctivae    Neck:      Thyroid: No thyromegaly. Cardiovascular:      Heart sounds: Normal heart sounds. No murmur heard. No friction rub. No gallop. Pulmonary:      Effort: Pulmonary effort is normal.      Breath sounds: Normal breath sounds. Abdominal:      Palpations: Abdomen is soft. Tenderness: There is no abdominal tenderness. There is no guarding or rebound. Musculoskeletal:      Comments: No leg edema noted B/L   Lymphadenopathy:      Cervical: No cervical adenopathy. Neurological:      Mental Status: She is alert. Psychiatric:         Mood and Affect: Mood normal.         ASSESSMENT AND PLAN    1. Essential hypertension  Patient clinically stable on present medications and denies any CP or SOB    2. Anxiety and depression  Patient clinically stable on present medications    3. Endometriosis  Pt is being followed by OB/Gyne and is with a nontender abdomen on PE    4. Hidradenitis suppurativa  Pt is being managed by Dermatology and was told to bring in a copy of her recent bloodwork results for review    5. Irritable bowel syndrome with constipation  Pt is followed by GI and did have recent stool impaction and was told to continue taking miralax medication and pt is awaiting approval of linzess medication by her insurance company for control        Gaston Guillaume MD      Return in about 4 months (around 12/9/2021). Patient Instructions     Patient Education        Learning About Hidradenitis Suppurativa  What is hidradenitis suppurativa? Hidradenitis suppurativa (say \"ppq-flnd-mn-NY-tus sup-yur-uh-TY-vuh\") is a skin condition that causes lumps on the skin. The lumps look like pimples or boils. The condition can come and go for many years. Doctors don't know exactly how this condition starts. But they do know that something irritates and inflames the hair follicles, causing them to swell and form lumps. This skin condition can't be spread from person to person (isn't contagious). What are the symptoms? Red lumps that may look like pimples, acne, or boils appear on the skin and are usually painful. The lumps:  · Usually occur in areas where skin rubs against skin, such as in the armpit. They can also appear under the breasts, in the groin area, on the buttocks, around the anus, and on the inner thighs. · May go away on their own in a few weeks. But they often come back in the same area. · Can become infected and break open, draining blood and pus that usually smells bad. If the condition isn't treated and gets worse, hollow tunnels can form under the skin. Over time, the infection and tunnels will heal, but a thick scar may form. These scars can keep skin from stretching naturally. How is hidradenitis suppurativa treated?   The treatment depends on how serious the condition is. Your doctor may discuss:  · Medicines. You may need to take pills, such as antibiotics, or rub a prescription ointment or cream on the affected skin. · Corticosteroid injections (shots). You may get these shots into the affected areas. · Hormone pills. Some women are helped by taking birth control pills or other medicines that affect their hormones. · Removing infected tissue. Home treatment  Home treatment may provide pain relief and help prevent nodules from coming back. Home treatment includes:  · Wearing loose-fitting clothes. · Washing the area gently with mild soap. · Staying at a healthy weight. If you are overweight, losing weight may help the condition. · Quitting smoking, if you smoke. Follow-up care is a key part of your treatment and safety. Be sure to make and go to all appointments, and call your doctor if you are having problems. It's also a good idea to know your test results and keep a list of the medicines you take. Where can you learn more? Go to https://Neo PLMpeQualtrics.[employer]. org and sign in to your [employer] account. Enter [phone] [id number] in the TagTagCity box to learn more about \"Learning About Hidradenitis Suppurativa. \"     If you do not have an account, please click on the \"Sign Up Now\" link. Current as of: March 3, 2021               Content Version: 12.9  © 7802-5901 Healthwise, Incorporated. Care instructions adapted under license by [facility] ([facility]). If you have questions about a medical condition or this instruction, always ask your healthcare professional. [place] [address] any warranty or liability for your use of this information.

## 2021-08-09 NOTE — PATIENT INSTRUCTIONS
Patient Education        Learning About Hidradenitis Suppurativa  What is hidradenitis suppurativa? Hidradenitis suppurativa (say \"jwi-nbih-vk-NY-tus sup-paula-uh-TY-vuh\") is a skin condition that causes lumps on the skin. The lumps look like pimples or boils. The condition can come and go for many years. Doctors don't know exactly how this condition starts. But they do know that something irritates and inflames the hair follicles, causing them to swell and form lumps. This skin condition can't be spread from person to person (isn't contagious). What are the symptoms? Red lumps that may look like pimples, acne, or boils appear on the skin and are usually painful. The lumps:  · Usually occur in areas where skin rubs against skin, such as in the armpit. They can also appear under the breasts, in the groin area, on the buttocks, around the anus, and on the inner thighs. · May go away on their own in a few weeks. But they often come back in the same area. · Can become infected and break open, draining blood and pus that usually smells bad. If the condition isn't treated and gets worse, hollow tunnels can form under the skin. Over time, the infection and tunnels will heal, but a thick scar may form. These scars can keep skin from stretching naturally. How is hidradenitis suppurativa treated? The treatment depends on how serious the condition is. Your doctor may discuss:  · Medicines. You may need to take pills, such as antibiotics, or rub a prescription ointment or cream on the affected skin. · Corticosteroid injections (shots). You may get these shots into the affected areas. · Hormone pills. Some women are helped by taking birth control pills or other medicines that affect their hormones. · Removing infected tissue. Home treatment  Home treatment may provide pain relief and help prevent nodules from coming back. Home treatment includes:  · Wearing loose-fitting clothes.   · Washing the area gently with mild soap. · Staying at a healthy weight. If you are overweight, losing weight may help the condition. · Quitting smoking, if you smoke. Follow-up care is a key part of your treatment and safety. Be sure to make and go to all appointments, and call your doctor if you are having problems. It's also a good idea to know your test results and keep a list of the medicines you take. Where can you learn more? Go to https://CloudianpeWedding Spot.[employer]. org and sign in to your [employer] account. Enter [phone] [id number] in the Privy box to learn more about \"Learning About Hidradenitis Suppurativa. \"     If you do not have an account, please click on the \"Sign Up Now\" link. Current as of: March 3, 2021               Content Version: 12.9  © 6145-3810 [employer]. Care instructions adapted under license by [facility] ([facility]). If you have questions about a medical condition or this instruction, always ask your healthcare professional. [place] [address] any warranty or liability for your use of this information.

## 2021-09-08 ENCOUNTER — OFFICE VISIT (OUTPATIENT)
Dept: PULMONOLOGY | Age: 41
End: 2021-09-08
Payer: MEDICAID

## 2021-09-08 VITALS
OXYGEN SATURATION: 98 % | DIASTOLIC BLOOD PRESSURE: 78 MMHG | HEIGHT: 64 IN | HEART RATE: 108 BPM | SYSTOLIC BLOOD PRESSURE: 112 MMHG | WEIGHT: 267.2 LBS | BODY MASS INDEX: 45.62 KG/M2

## 2021-09-08 DIAGNOSIS — I10 ESSENTIAL HYPERTENSION: Chronic | ICD-10-CM

## 2021-09-08 DIAGNOSIS — F32.A ANXIETY AND DEPRESSION: Chronic | ICD-10-CM

## 2021-09-08 DIAGNOSIS — F41.9 ANXIETY AND DEPRESSION: Chronic | ICD-10-CM

## 2021-09-08 DIAGNOSIS — G47.33 OBSTRUCTIVE SLEEP APNEA SYNDROME: Primary | Chronic | ICD-10-CM

## 2021-09-08 DIAGNOSIS — E66.01 MORBID OBESITY, UNSPECIFIED OBESITY TYPE (HCC): Chronic | ICD-10-CM

## 2021-09-08 DIAGNOSIS — E28.2 PCOS (POLYCYSTIC OVARIAN SYNDROME): Chronic | ICD-10-CM

## 2021-09-08 PROCEDURE — 99204 OFFICE O/P NEW MOD 45 MIN: CPT | Performed by: INTERNAL MEDICINE

## 2021-09-08 PROCEDURE — G8417 CALC BMI ABV UP PARAM F/U: HCPCS | Performed by: INTERNAL MEDICINE

## 2021-09-08 PROCEDURE — G8427 DOCREV CUR MEDS BY ELIG CLIN: HCPCS | Performed by: INTERNAL MEDICINE

## 2021-09-08 ASSESSMENT — ENCOUNTER SYMPTOMS
RHINORRHEA: 0
ABDOMINAL PAIN: 0
NAUSEA: 0
ABDOMINAL DISTENTION: 0
APNEA: 0
PHOTOPHOBIA: 0
CHOKING: 0
EYE PAIN: 0
ALLERGIC/IMMUNOLOGIC NEGATIVE: 1
VOMITING: 0
CHEST TIGHTNESS: 0
SHORTNESS OF BREATH: 0

## 2021-09-08 ASSESSMENT — SLEEP AND FATIGUE QUESTIONNAIRES
ESS TOTAL SCORE: 8
HOW LIKELY ARE YOU TO NOD OFF OR FALL ASLEEP WHILE SITTING AND TALKING TO SOMEONE: 0
HOW LIKELY ARE YOU TO NOD OFF OR FALL ASLEEP WHILE SITTING AND READING: 1
HOW LIKELY ARE YOU TO NOD OFF OR FALL ASLEEP WHEN YOU ARE A PASSENGER IN A CAR FOR AN HOUR WITHOUT A BREAK: 2
NECK CIRCUMFERENCE (INCHES): 17.5
HOW LIKELY ARE YOU TO NOD OFF OR FALL ASLEEP WHILE WATCHING TV: 1
HOW LIKELY ARE YOU TO NOD OFF OR FALL ASLEEP WHILE SITTING QUIETLY AFTER LUNCH WITHOUT ALCOHOL: 1
HOW LIKELY ARE YOU TO NOD OFF OR FALL ASLEEP IN A CAR, WHILE STOPPED FOR A FEW MINUTES IN TRAFFIC: 0
HOW LIKELY ARE YOU TO NOD OFF OR FALL ASLEEP WHILE LYING DOWN TO REST IN THE AFTERNOON WHEN CIRCUMSTANCES PERMIT: 3
HOW LIKELY ARE YOU TO NOD OFF OR FALL ASLEEP WHILE SITTING INACTIVE IN A PUBLIC PLACE: 0

## 2021-09-08 NOTE — LETTER
[facility]  [place] [id number] [place] [address]  Phone: [phone]  Fax: [phone]           Sue Omer MD      September 8, 2021     Patient: Fritz Parrish   MR Number: <[id number]>   YOB: 1980   Date of Visit: 9/8/2021       Dear Dr. Goldberg Later: Thank you for referring [facility] to me for evaluation/treatment. Below are the relevant portions of my assessment and plan of care. Visit Diagnoses and Associated Orders     Obstructive sleep apnea syndrome   (New Problem)  -  Primary    on treatment, need old records         Essential hypertension   (Stable)           PCOS (polycystic ovarian syndrome)   (Stable)           Anxiety and depression   (Stable)           Morbid obesity, unspecified obesity type ([address]   (Not Stable)                 Will attempt to get all old records. Reviewed compliance download with pt. Supplies and parts as needed for her machine. These are medically necessary. Continue medications per her PCP and other physicians. Limit caffeine use after 3pm.  Encouraged her to work on weight loss through diet and exercise. The primary encounter diagnosis was Obstructive sleep apnea syndrome. Diagnoses of Essential hypertension, PCOS (polycystic ovarian syndrome), Anxiety and depression, and Morbid obesity, unspecified obesity type ([address] were also pertinent to this visit. The chronic medical conditions listed are directly related to the primary diagnosis listed above. The management of the primary diagnosis affects the secondary diagnosis and vice versa. Pmin-14 for now and needs to work on mask. This information was analyzed to assess complexity and medical decision making in regards to further testing and management. Pt would medically benefit from wt loss for MARCO (diet, exercise, surgical). No orders of the defined types were placed in this encounter.       If you have questions, please do not hesitate to call

## 2021-09-08 NOTE — PROGRESS NOTES
Avinash Drew MD, [facility]  Tiffanie Kehrt CNP Ranelle Rend CNP [address]  [address] [facility], [address] [facility] [phone]   [facility] [address] [facility] [phone]     [place] [address]  Dept: [phone]  Loc: [phone]    Assessment:      Visit Diagnoses and Associated Orders     Obstructive sleep apnea syndrome   (New Problem)  -  Primary    on treatment, need old records         Essential hypertension   (Stable)           PCOS (polycystic ovarian syndrome)   (Stable)           Anxiety and depression   (Stable)           Morbid obesity, unspecified obesity type ([address]   (Not Stable)                  Plan: Will attempt to get all old records. Reviewed compliance download with pt. Supplies and parts as needed for her machine. These are medically necessary. Continue medications per her PCP and other physicians. Limit caffeine use after 3pm.  Encouraged her to work on weight loss through diet and exercise. The primary encounter diagnosis was Obstructive sleep apnea syndrome. Diagnoses of Essential hypertension, PCOS (polycystic ovarian syndrome), Anxiety and depression, and Morbid obesity, unspecified obesity type ([address] were also pertinent to this visit. The chronic medical conditions listed are directly related to the primary diagnosis listed above. The management of the primary diagnosis affects the secondary diagnosis and vice versa. Pmin-14 for now and needs to work on mask. This information was analyzed to assess complexity and medical decision making in regards to further testing and management. Pt would medically benefit from wt loss for MARCO (diet, exercise, surgical). No orders of the defined types were placed in this encounter. Subjective:     Patient ID: Shadi Henry is a 36 y.o. female.     Chief Complaint   Patient presents with Negative for cold intolerance and heat intolerance. Genitourinary: Negative for difficulty urinating, dysuria, frequency and urgency. Musculoskeletal: Negative. Negative for neck pain and neck stiffness. Skin: Negative. Allergic/Immunologic: Negative. Neurological: Negative for tremors, seizures, syncope and weakness. Hematological: Negative for adenopathy. Does not bruise/bleed easily. Psychiatric/Behavioral: Positive for sleep disturbance. Negative for agitation, behavioral problems and confusion. Objective:     Vitals:  Weight BMI   Wt Readings from Last 3 Encounters:   09/08/21 267 lb 3.2 oz (121.2 kg)   08/09/21 268 lb (121.6 kg)   05/26/21 260 lb (117.9 kg)    Body mass index is 45.86 kg/m². BP HR SaO2   BP Readings from Last 3 Encounters:   09/08/21 112/78   08/09/21 114/78   05/26/21 130/88    Pulse Readings from Last 3 Encounters:   09/08/21 108   08/09/21 96   05/26/21 96    SpO2 Readings from Last 3 Encounters:   09/08/21 98%   08/09/21 98%   05/26/21 98%        Physical Exam  Vitals reviewed. Constitutional:       General: She is not in acute distress. Appearance: Normal appearance. She is well-developed. She is obese. She is not toxic-appearing or diaphoretic. HENT:      Head: Normocephalic and atraumatic. Not macrocephalic and not microcephalic. Right Ear: External ear normal.      Left Ear: External ear normal.      Nose: Septal deviation and mucosal edema present. No nasal deformity. Mouth/Throat:      Lips: Pink. Mouth: Mucous membranes are moist.      Tongue: No lesions. Palate: No mass. Pharynx: Uvula midline. No oropharyngeal exudate or uvula swelling. Tonsils: No tonsillar exudate or tonsillar abscesses. Comments: Tonsils: normal size  Eyes:      General: Lids are normal.      Extraocular Movements: Extraocular movements intact.       Conjunctiva/sclera: Conjunctivae normal.      Pupils: Pupils are equal, round, and reactive to light. Neck:      Vascular: No JVD. Trachea: Trachea normal.      Comments: Neck Circ: 17.5 inches    Cardiovascular:      Rate and Rhythm: Normal rate and regular rhythm. Heart sounds: Normal heart sounds. Pulmonary:      Effort: Pulmonary effort is normal.      Breath sounds: Normal breath sounds. Abdominal:      General: Bowel sounds are normal.   Musculoskeletal:      Cervical back: Normal range of motion. Comments: No evidence of cyanosis or clubbing of nails   Skin:     General: Skin is warm. Nails: There is no clubbing. Neurological:      General: No focal deficit present. Mental Status: She is alert. Psychiatric:         Attention and Perception: Attention normal.         Mood and Affect: Mood and affect normal.         Speech: Speech normal.         Behavior: Behavior normal. Behavior is cooperative. Thought Content:  Thought content normal.         Electronically signed by Vishal Sorensen MD on9/8/2021 at 4:18 PM

## 2021-09-08 NOTE — PROGRESS NOTES
Jaime Quinones         : 1980    Diagnosis: [x] MARCO (G47.33) [] CSA (G47.31) [] Apnea (G47.30)   Length of Need: [x] 13 Months [] 99 Months [] Other:    Machine (BEBETO!): [] Respironics Dream Station      Auto [] ResMed AirSense     Auto [] Other:     []  CPAP () [] Bilevel ()   Mode: [] Auto [] Spontaneous    Mode: [] Auto [] Spontaneous            Comfort Settings:        Humidifier: [] Heated ()        [x] Water chamber replacement ()/ 1 per 6 months        Mask:   [x] Nasal () /1 per 3 months [] Full Face () /1 per 3 months   [x] Patient choice -Size and fit mask [] Patient Choice - Size and fit mask   [] Dispense:  [] Dispense:    [x] Headgear () / 1 per 3 months [] Headgear () / 1 per 3 months   [x] Replacement Nasal Cushion ()/2 per month [] Interface Replacement ()/1 per month   [x] Replacement Nasal Pillows ()/2 per month         Tubing: [x] Heated ()/1 per 3 months    [] Standard ()/1 per 3 months [] Other:           Filters: [x] Non-disposable ()/1 per 6 months     [x] Ultra-Fine, Disposable ()/2 per month        Miscellaneous: [] Chin Strap ()/ 1 per 6 months [] O2 bleed-in:       LPM   [] Oximetry on CPAP/Bilevel []  Other:    [x] Modem: ()         Start Order Date: 21    MEDICAL JUSTIFICATION:  I, the undersigned, certify that the above prescribed supplies are medically necessary for this patients wellbeing. In my opinion, the supplies are both reasonable and necessary in reference to accepted standards of medicalpractice in treatment of this patients condition.     Judy Crawford MD      NPI: [id number]       Order Signed Date: 21    Electronically signed by Judy Crawford MD on 2021 at 4:21 PM    Jaime Quinones  1980  [address] [place]  [phone] (home)   [phone] (mobile)      Insurance Info (confirm with patient if correct):  Payor/Plan Subscr  Sex Relation Sub.  Ins. ID Effective Group Num

## 2021-10-07 ENCOUNTER — HOSPITAL ENCOUNTER (OUTPATIENT)
Dept: ULTRASOUND IMAGING | Age: 41
Discharge: HOME | End: 2021-10-07
Payer: MEDICAID

## 2021-10-07 DIAGNOSIS — E04.1 THYROID NODULE: ICD-10-CM

## 2021-10-07 PROCEDURE — 76536 US EXAM OF HEAD AND NECK: CPT

## 2021-10-29 ENCOUNTER — OFFICE VISIT (OUTPATIENT)
Dept: PRIMARY CARE CLINIC | Age: 41
End: 2021-10-29
Payer: MEDICAID

## 2021-10-29 VITALS
HEIGHT: 64 IN | WEIGHT: 273 LBS | HEART RATE: 87 BPM | OXYGEN SATURATION: 97 % | SYSTOLIC BLOOD PRESSURE: 110 MMHG | BODY MASS INDEX: 46.61 KG/M2 | DIASTOLIC BLOOD PRESSURE: 72 MMHG

## 2021-10-29 DIAGNOSIS — R31.9 HEMATURIA, UNSPECIFIED TYPE: Primary | ICD-10-CM

## 2021-10-29 DIAGNOSIS — R10.30 LOWER ABDOMINAL PAIN: ICD-10-CM

## 2021-10-29 DIAGNOSIS — Z00.00 HEALTHCARE MAINTENANCE: ICD-10-CM

## 2021-10-29 LAB
A/G RATIO: 1.5 (ref 1.1–2.2)
ALBUMIN SERPL-MCNC: 4.4 G/DL (ref 3.4–5)
ALP BLD-CCNC: 102 U/L (ref 40–129)
ALT SERPL-CCNC: 16 U/L (ref 10–40)
ANION GAP SERPL CALCULATED.3IONS-SCNC: 14 MMOL/L (ref 3–16)
AST SERPL-CCNC: 13 U/L (ref 15–37)
BASOPHILS ABSOLUTE: 0.1 K/UL (ref 0–0.2)
BASOPHILS RELATIVE PERCENT: 0.8 %
BILIRUB SERPL-MCNC: <0.2 MG/DL (ref 0–1)
BILIRUBIN, POC: NORMAL
BLOOD URINE, POC: NORMAL
BUN BLDV-MCNC: 13 MG/DL (ref 7–20)
CALCIUM SERPL-MCNC: 9.5 MG/DL (ref 8.3–10.6)
CHLORIDE BLD-SCNC: 103 MMOL/L (ref 99–110)
CLARITY, POC: NORMAL
CO2: 20 MMOL/L (ref 21–32)
COLOR, POC: YELLOW
CREAT SERPL-MCNC: 1.1 MG/DL (ref 0.6–1.1)
EOSINOPHILS ABSOLUTE: 0.3 K/UL (ref 0–0.6)
EOSINOPHILS RELATIVE PERCENT: 2.1 %
GFR AFRICAN AMERICAN: >60
GFR NON-AFRICAN AMERICAN: 55
GLUCOSE BLD-MCNC: 93 MG/DL (ref 70–99)
GLUCOSE URINE, POC: NORMAL
HCT VFR BLD CALC: 38.9 % (ref 36–48)
HEMOGLOBIN: 12 G/DL (ref 12–16)
KETONES, POC: NORMAL
LEUKOCYTE EST, POC: NORMAL
LYMPHOCYTES ABSOLUTE: 2.7 K/UL (ref 1–5.1)
LYMPHOCYTES RELATIVE PERCENT: 21.4 %
MCH RBC QN AUTO: 23.6 PG (ref 26–34)
MCHC RBC AUTO-ENTMCNC: 30.9 G/DL (ref 31–36)
MCV RBC AUTO: 76.4 FL (ref 80–100)
MONOCYTES ABSOLUTE: 0.8 K/UL (ref 0–1.3)
MONOCYTES RELATIVE PERCENT: 6.4 %
NEUTROPHILS ABSOLUTE: 8.9 K/UL (ref 1.7–7.7)
NEUTROPHILS RELATIVE PERCENT: 69.3 %
NITRITE, POC: NORMAL
PDW BLD-RTO: 18.5 % (ref 12.4–15.4)
PH, POC: 6.5
PLATELET # BLD: 410 K/UL (ref 135–450)
PMV BLD AUTO: 8.5 FL (ref 5–10.5)
POTASSIUM SERPL-SCNC: 5.3 MMOL/L (ref 3.5–5.1)
PROTEIN, POC: NORMAL
RBC # BLD: 5.09 M/UL (ref 4–5.2)
SODIUM BLD-SCNC: 137 MMOL/L (ref 136–145)
SPECIFIC GRAVITY, POC: 1.01
TOTAL PROTEIN: 7.4 G/DL (ref 6.4–8.2)
UROBILINOGEN, POC: 0.2
WBC # BLD: 12.8 K/UL (ref 4–11)

## 2021-10-29 PROCEDURE — 81002 URINALYSIS NONAUTO W/O SCOPE: CPT | Performed by: STUDENT IN AN ORGANIZED HEALTH CARE EDUCATION/TRAINING PROGRAM

## 2021-10-29 PROCEDURE — 36415 COLL VENOUS BLD VENIPUNCTURE: CPT | Performed by: STUDENT IN AN ORGANIZED HEALTH CARE EDUCATION/TRAINING PROGRAM

## 2021-10-29 PROCEDURE — G8427 DOCREV CUR MEDS BY ELIG CLIN: HCPCS | Performed by: STUDENT IN AN ORGANIZED HEALTH CARE EDUCATION/TRAINING PROGRAM

## 2021-10-29 PROCEDURE — 1036F TOBACCO NON-USER: CPT | Performed by: STUDENT IN AN ORGANIZED HEALTH CARE EDUCATION/TRAINING PROGRAM

## 2021-10-29 PROCEDURE — G8417 CALC BMI ABV UP PARAM F/U: HCPCS | Performed by: STUDENT IN AN ORGANIZED HEALTH CARE EDUCATION/TRAINING PROGRAM

## 2021-10-29 PROCEDURE — 99213 OFFICE O/P EST LOW 20 MIN: CPT | Performed by: STUDENT IN AN ORGANIZED HEALTH CARE EDUCATION/TRAINING PROGRAM

## 2021-10-29 PROCEDURE — G8484 FLU IMMUNIZE NO ADMIN: HCPCS | Performed by: STUDENT IN AN ORGANIZED HEALTH CARE EDUCATION/TRAINING PROGRAM

## 2021-10-29 ASSESSMENT — ENCOUNTER SYMPTOMS
ABDOMINAL PAIN: 1
WHEEZING: 0
SHORTNESS OF BREATH: 0

## 2021-10-29 NOTE — ASSESSMENT & PLAN NOTE
Many potential etiologies, however based on history and symptoms, kidney stone or bladder pathology are a possibility. CT scan of the abdomen ordered to rule out kidney stone. Patient was also referred to urologist for further evaluation. This could also be secondary to vaginal/uterine problem. Further evaluation will be considered depending on CT scan results. Urine culture ordered to rule out infection.

## 2021-10-29 NOTE — PATIENT INSTRUCTIONS
fever, chills, or body aches. ? It hurts to urinate. ? You have groin or belly pain.     · You have more blood in your urine. Watch closely for changes in your health, and be sure to contact your doctor if:    · You have new urination problems.     · You do not get better as expected. Where can you learn more? Go to https://A[employer] Systemspepiceweb.ITN. org and sign in to your [employer] account. Enter H160 in the [employer] box to learn more about \"Blood in the Urine: Care Instructions. \"     If you do not have an account, please click on the \"Sign Up Now\" link. Current as of: February 10, 2021               Content Version: 13.0  © 2006-2021 Healthwise, Incorporated. Care instructions adapted under license by [facility] ([facility]). If you have questions about a medical condition or this instruction, always ask your healthcare professional. [place] [address] any warranty or liability for your use of this information.

## 2021-10-29 NOTE — PROGRESS NOTES
Patient:  Valorie Jimenez 36 y.o. female     Date of Service: 10/29/2021       Chief complaint:   Chief Complaint   Patient presents with    Abdominal Pain     c/o lower abd pain x 6 weeks, blood in urinate        History of Present Illness   Patient presenting with 6 weeks history of right lower abdominal and flank pain. This is associated with blood in the urine. Patient reports that she first noticed blood in her urine before the abdominal pain started, around 6 weeks ago. Patient has had most recent IUD for 3 years and was checked recently by her OB, no problems. Patient usually doesn't have menstrual cycles. Patient has no history of uterine fibroids. Abdominal pain comes and goes. She has noticed clots in her urine. She feels the pain is through the back. Patient denies fever or vaginal discharge. Her abdominal pain, the most it has been is 6 and today is a 2. She usually take   Patient reports to have had blood in the urine before and was evaluated by urology and she was told her bladder was not emptying completely. However she did not follow up with them. Patient has history of PCOS and endometriosis. Also history of hydradenitis suppurativa. Patient denies any fever or chills. She also denies any urinary symptoms besides the blood in the urine. [employer]:   Review of Systems   Constitutional: Negative for fever. Respiratory: Negative for shortness of breath and wheezing. Cardiovascular: Negative for chest pain and palpitations. Gastrointestinal: Positive for abdominal pain (Right lower quadrant and right flank). Genitourinary: Positive for hematuria. Physical Exam   Vitals: /72 (Site: Left Upper Arm, Position: Sitting, Cuff Size: Large Adult)   Pulse 87   Ht 5' 4\" (1.626 m)   Wt 273 lb (123.8 kg)   SpO2 97%   BMI 46.86 kg/m²   Physical Exam  Constitutional:       Appearance: Normal appearance.    Cardiovascular:      Rate and Rhythm: Normal rate and regular rhythm. Pulses: Normal pulses. Heart sounds: Normal heart sounds. Pulmonary:      Effort: Pulmonary effort is normal.      Breath sounds: Normal breath sounds. Abdominal:      Tenderness: There is abdominal tenderness (Tenderness palpation on right lower quadrant. No tenderness on the suprapubic area or left lower quadrant. . No tenderness on the right flank, however mild CVA tenderness on percussion. ). Neurological:      Mental Status: She is alert and oriented to person, place, and time. Psychiatric:         Mood and Affect: Mood normal.         Behavior: Behavior normal.            Results for POC orders placed in visit on 10/29/21   POCT Urinalysis no Micro   Result Value Ref Range    Color, UA yellow     Clarity, UA      Glucose, UA POC neg     Bilirubin, UA neg     Ketones, UA neg     Spec Grav, UA 1.015     Blood, UA POC large     pH, UA 6.5     Protein, UA POC neg     Urobilinogen, UA 0.2     Leukocytes, UA neg     Nitrite, UA neg        Assessment and Plan   1. Hematuria, unspecified type  Assessment & Plan: Many potential etiologies, however based on history and symptoms, kidney stone or bladder pathology are a possibility. CT scan of the abdomen ordered to rule out kidney stone. Patient was also referred to urologist for further evaluation. This could also be secondary to vaginal/uterine problem. Further evaluation will be considered depending on CT scan results. Urine culture ordered to rule out infection. Orders:  -     CBC Auto Differential  -     COMPREHENSIVE METABOLIC PANEL  -     Culture, Urine  -     [facility]  2. Lower abdominal pain  Assessment & Plan:  Refer to #1  Orders:  -     POCT Urinalysis no Micro  -     CBC Auto Differential  -     COMPREHENSIVE METABOLIC PANEL  -     Culture, Urine  -     [facility]  3.  Healthcare maintenance  -     CBC Auto Differential  -     COMPREHENSIVE METABOLIC PANEL      Issues to address at future visit/s:     Return to Office: Return in about 4 weeks (around 11/26/2021). Medication List:    Current Outpatient Medications   Medication Sig Dispense Refill    busPIRone (BUSPAR) 5 MG tablet TAKE ONE TABLET BY MOUTH TWICE A DAY 60 tablet 2    amLODIPine (NORVASC) 5 MG tablet TAKE 1 TABLET BY MOUTH DAILY 30 tablet 3    spironolactone (ALDACTONE) 100 MG tablet Take 100 mg by mouth      ondansetron (ZOFRAN ODT) 4 MG disintegrating tablet Take 1 tablet by mouth every 8 hours as needed for Nausea 20 tablet 0    benzonatate (TESSALON) 100 MG capsule Take 100 mg by mouth 3 times daily as needed      benzoyl peroxide 5 % external liquid       clindamycin (CLEOCIN T) 1 % external solution       escitalopram (LEXAPRO) 20 MG tablet TAKE ONE TABLET BY MOUTH DAILY      Lancets (ONETOUCH DELICA PLUS [id number]) MISC Use to test blood glucose once daily      loratadine (CLARITIN) 10 MG tablet Take 10 mg by mouth daily      metroNIDAZOLE (METROCREAM) 0.75 % cream APPLY TO RASH ON FACE TWO TIMES A DAY      mupirocin (BACTROBAN) 2 % ointment APPLY TO OPEN SORES TWO TIMES A DAY      nystatin-triamcinolone (MYCOLOG II) 671884-9.1 UNIT/GM-% cream APPLY TOPICALLY THREE TIMES A DAY      ondansetron (ZOFRAN) 4 MG tablet Take 4 mg by mouth every 8 hours as needed      pantoprazole (PROTONIX) 40 MG tablet TAKE ONE TABLET BY MOUTH TWICE A DAY BEFORE MEALS      polyethylene glycol (GLYCOLAX) 17 GM/SCOOP powder Take 17 g by mouth daily      Semaglutide, 1 MG/DOSE, (OZEMPIC, 1 MG/DOSE,) 2 MG/1.5ML SOPN Inject 1 mg into the skin every 7 days      silver sulfADIAZINE (SILVADENE) 1 % cream Apply 1 squirt to wound daily. Continue until seen in follow-up with physician.       predniSONE (DELTASONE) 10 MG tablet Take 40 mg by mouth daily as needed      spironolactone (ALDACTONE) 100 MG tablet Take 100 mg by mouth 2 times daily      inFLIXimab (INFLECTRA) 100 MG SOLR Infuse intravenously every 28 days      linaclotide (LINZESS) 145 MCG capsule Take 1 capsule by mouth 2 times daily 60 capsule 1     No current facility-administered medications for this visit. Electronically signed by Ernst Maya MD on 10/29/2021 at 7:59 AM     This dictation was generated by voice recognition computer software. Although all attempts are made to edit the dictation for accuracy, there may be errors in the transcription that are not intended.

## 2021-10-30 LAB — URINE CULTURE, ROUTINE: NORMAL

## 2021-11-05 ENCOUNTER — HOSPITAL ENCOUNTER (OUTPATIENT)
Dept: CT IMAGING | Age: 41
Discharge: HOME | End: 2021-11-05
Payer: MEDICAID

## 2021-11-05 ENCOUNTER — TELEPHONE (OUTPATIENT)
Dept: ENT CLINIC | Age: 41
End: 2021-11-05

## 2021-11-05 DIAGNOSIS — R10.30 LOWER ABDOMINAL PAIN: ICD-10-CM

## 2021-11-05 DIAGNOSIS — R31.9 HEMATURIA, UNSPECIFIED TYPE: ICD-10-CM

## 2021-11-05 PROCEDURE — 74176 CT ABD & PELVIS W/O CONTRAST: CPT

## 2021-11-08 DIAGNOSIS — R89.9 ABNORMAL LABORATORY TEST: Primary | ICD-10-CM

## 2021-11-24 ENCOUNTER — OFFICE VISIT (OUTPATIENT)
Dept: PRIMARY CARE CLINIC | Age: 41
End: 2021-11-24
Payer: MEDICAID

## 2021-11-24 VITALS
DIASTOLIC BLOOD PRESSURE: 88 MMHG | TEMPERATURE: 98 F | OXYGEN SATURATION: 99 % | HEIGHT: 64 IN | HEART RATE: 92 BPM | SYSTOLIC BLOOD PRESSURE: 130 MMHG | WEIGHT: 274 LBS | BODY MASS INDEX: 46.78 KG/M2 | RESPIRATION RATE: 24 BRPM

## 2021-11-24 DIAGNOSIS — Z23 NEED FOR INFLUENZA VACCINATION: Primary | ICD-10-CM

## 2021-11-24 DIAGNOSIS — Z01.818 PREOPERATIVE EXAMINATION: ICD-10-CM

## 2021-11-24 LAB
A/G RATIO: 1.4 (ref 1.1–2.2)
ALBUMIN SERPL-MCNC: 4.4 G/DL (ref 3.4–5)
ALP BLD-CCNC: 95 U/L (ref 40–129)
ALT SERPL-CCNC: 18 U/L (ref 10–40)
ANION GAP SERPL CALCULATED.3IONS-SCNC: 17 MMOL/L (ref 3–16)
AST SERPL-CCNC: 15 U/L (ref 15–37)
BASOPHILS ABSOLUTE: 0.1 K/UL (ref 0–0.2)
BASOPHILS RELATIVE PERCENT: 0.6 %
BILIRUB SERPL-MCNC: 0.3 MG/DL (ref 0–1)
BUN BLDV-MCNC: 10 MG/DL (ref 7–20)
CALCIUM SERPL-MCNC: 9.6 MG/DL (ref 8.3–10.6)
CHLORIDE BLD-SCNC: 98 MMOL/L (ref 99–110)
CO2: 23 MMOL/L (ref 21–32)
CREAT SERPL-MCNC: 0.9 MG/DL (ref 0.6–1.1)
EOSINOPHILS ABSOLUTE: 0.3 K/UL (ref 0–0.6)
EOSINOPHILS RELATIVE PERCENT: 3.2 %
GFR AFRICAN AMERICAN: >60
GFR NON-AFRICAN AMERICAN: >60
GLUCOSE BLD-MCNC: 92 MG/DL (ref 70–99)
HCT VFR BLD CALC: 38.1 % (ref 36–48)
HEMOGLOBIN: 11.7 G/DL (ref 12–16)
LYMPHOCYTES ABSOLUTE: 3 K/UL (ref 1–5.1)
LYMPHOCYTES RELATIVE PERCENT: 27.5 %
MCH RBC QN AUTO: 23.1 PG (ref 26–34)
MCHC RBC AUTO-ENTMCNC: 30.9 G/DL (ref 31–36)
MCV RBC AUTO: 75 FL (ref 80–100)
MONOCYTES ABSOLUTE: 0.9 K/UL (ref 0–1.3)
MONOCYTES RELATIVE PERCENT: 8.8 %
NEUTROPHILS ABSOLUTE: 6.4 K/UL (ref 1.7–7.7)
NEUTROPHILS RELATIVE PERCENT: 59.9 %
PDW BLD-RTO: 19 % (ref 12.4–15.4)
PLATELET # BLD: 350 K/UL (ref 135–450)
PMV BLD AUTO: 8.4 FL (ref 5–10.5)
POTASSIUM SERPL-SCNC: 4.4 MMOL/L (ref 3.5–5.1)
RBC # BLD: 5.08 M/UL (ref 4–5.2)
SODIUM BLD-SCNC: 138 MMOL/L (ref 136–145)
TOTAL PROTEIN: 7.5 G/DL (ref 6.4–8.2)
WBC # BLD: 10.8 K/UL (ref 4–11)

## 2021-11-24 PROCEDURE — 90471 IMMUNIZATION ADMIN: CPT | Performed by: FAMILY MEDICINE

## 2021-11-24 PROCEDURE — 36415 COLL VENOUS BLD VENIPUNCTURE: CPT | Performed by: FAMILY MEDICINE

## 2021-11-24 PROCEDURE — G8427 DOCREV CUR MEDS BY ELIG CLIN: HCPCS | Performed by: FAMILY MEDICINE

## 2021-11-24 PROCEDURE — G8482 FLU IMMUNIZE ORDER/ADMIN: HCPCS | Performed by: FAMILY MEDICINE

## 2021-11-24 PROCEDURE — G8417 CALC BMI ABV UP PARAM F/U: HCPCS | Performed by: FAMILY MEDICINE

## 2021-11-24 PROCEDURE — 1036F TOBACCO NON-USER: CPT | Performed by: FAMILY MEDICINE

## 2021-11-24 PROCEDURE — 90674 CCIIV4 VAC NO PRSV 0.5 ML IM: CPT | Performed by: FAMILY MEDICINE

## 2021-11-24 PROCEDURE — 99214 OFFICE O/P EST MOD 30 MIN: CPT | Performed by: FAMILY MEDICINE

## 2021-11-24 ASSESSMENT — ENCOUNTER SYMPTOMS
DIARRHEA: 0
SHORTNESS OF BREATH: 0
BLOOD IN STOOL: 0
ABDOMINAL PAIN: 1
COUGH: 0
SORE THROAT: 0
NAUSEA: 0
VOMITING: 0

## 2021-11-24 NOTE — PROGRESS NOTES
Chief Complaint   Patient presents with    Pre-op Exam     pt having cystoscopy on 11/30/2021 [facility]         Massiel Chavez is a 39 y.o. female who presents for preoperative evaluation for a  cystoscopy on 11/30/2021 secondary to blood in urine. Pt was seen by Dr. Madelyne Duane earlier this month and had a normal UA except for large blood and a followup urine culture showed no growth. Pt also had a CT scan abdomen/pelvis which was unremarkable along with CBC and CMP bloodwork which was WNL except for an elevated WBC 12.8 and neutrophils 8.9 along with decreased MCV 76.4 and mildly elevated potassium 5.3     Pt has a hx of psoriatic arthritis and is followed by Rheumatology and is on IV inflectra medication      Pt has a hx of Hidradenitis suppurativa since she was a teenager and is followed by [facility] and has had multiple surgeries (most recently 01/2020) along with an episode sepsis 2018. 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 Pt is with good control on IV inflectra medication and does take bursts of prednisone as needed recent flareups and also gets kenalog injections into lesions by Dermatology --- Pt's recent IV inflectra dosage was held this week secondary to upcoming cystoscopy and pt is currently taking a course of prednisone for a flareup in her L axilla     Patient has a past medical history significant for HTN and is on norvasc 5 mg daily     Pt is on miralax and protonix for irritable bowel and is followed by GI      Pt is on lexapro and buspar for anxiety/depression with good control. Pt is followed by [facility] and is on ozempic medication     Pt has a hx of PCOS and endometriosis and is followed by OB/Gyne and is on mirena medication      Pt is a nonsmoker and denies alcohol use    FHx negative for complications with anesthesia      Review of Systems   Constitutional: Negative for fever.    HENT: Negative for nosebleeds and sore

## 2021-11-28 ENCOUNTER — TELEPHONE (OUTPATIENT)
Dept: PRIMARY CARE CLINIC | Age: 41
End: 2021-11-28

## 2021-11-28 NOTE — TELEPHONE ENCOUNTER
----- Message from Sonia Saha sent at 11/26/2021 12:27 PM EST -----  Subject: Message to Provider    QUESTIONS  Information for Provider? ROSA Hernandez 67 called and said that the needed   her family history and medical clearance are needing to be added to the   pre-op papers the patient had filled out on 11/24/21.   ---------------------------------------------------------------------------  --------------  [address]  What is the best way for the office to contact you? Do not leave any   message, patient will call back for answer  Preferred Call Back Phone Number? [phone]  ---------------------------------------------------------------------------  --------------  SCRIPT ANSWERS  Relationship to Patient? Third Party  Representative Name?  [facility]

## 2021-11-29 ENCOUNTER — TELEPHONE (OUTPATIENT)
Dept: PRIMARY CARE CLINIC | Age: 41
End: 2021-11-29

## 2021-11-29 NOTE — TELEPHONE ENCOUNTER
[facility] called stating pt needs a letter or note in Epic giving medical clearance for procedure (Cystoscopy) tomorrow.

## 2021-12-15 ENCOUNTER — CLINICAL DOCUMENTATION (OUTPATIENT)
Dept: OTHER | Age: 41
End: 2021-12-15

## 2021-12-16 DIAGNOSIS — R89.9 ABNORMAL LABORATORY TEST: Primary | ICD-10-CM

## 2021-12-16 RX ORDER — BLOOD-GLUCOSE METER
1 KIT MISCELLANEOUS DAILY
Qty: 1 KIT | Refills: 0 | Status: SHIPPED | OUTPATIENT
Start: 2021-12-16

## 2021-12-22 ENCOUNTER — APPOINTMENT (OUTPATIENT)
Dept: GENERAL RADIOLOGY | Age: 41
End: 2021-12-22
Payer: MEDICAID

## 2021-12-22 ENCOUNTER — PATIENT MESSAGE (OUTPATIENT)
Dept: PRIMARY CARE CLINIC | Age: 41
End: 2021-12-22

## 2021-12-22 ENCOUNTER — HOSPITAL ENCOUNTER (EMERGENCY)
Age: 41
Discharge: HOME | End: 2021-12-22
Attending: EMERGENCY MEDICINE
Payer: MEDICAID

## 2021-12-22 VITALS
SYSTOLIC BLOOD PRESSURE: 152 MMHG | HEART RATE: 87 BPM | TEMPERATURE: 97.5 F | DIASTOLIC BLOOD PRESSURE: 99 MMHG | RESPIRATION RATE: 21 BRPM | OXYGEN SATURATION: 97 %

## 2021-12-22 DIAGNOSIS — R42 DIZZINESS: Primary | ICD-10-CM

## 2021-12-22 DIAGNOSIS — I10 HYPERTENSION, UNSPECIFIED TYPE: ICD-10-CM

## 2021-12-22 LAB
ANION GAP SERPL CALCULATED.3IONS-SCNC: 13 MMOL/L (ref 3–16)
BACTERIA: ABNORMAL /HPF
BASOPHILS ABSOLUTE: 0.1 K/UL (ref 0–0.2)
BASOPHILS RELATIVE PERCENT: 1.1 %
BILIRUBIN URINE: NEGATIVE
BLOOD, URINE: ABNORMAL
BUN BLDV-MCNC: 8 MG/DL (ref 7–20)
CALCIUM SERPL-MCNC: 8.9 MG/DL (ref 8.3–10.6)
CHLORIDE BLD-SCNC: 102 MMOL/L (ref 99–110)
CLARITY: CLEAR
CO2: 22 MMOL/L (ref 21–32)
COLOR: YELLOW
CREAT SERPL-MCNC: 0.9 MG/DL (ref 0.6–1.1)
D DIMER: <200 NG/ML DDU (ref 0–229)
EOSINOPHILS ABSOLUTE: 0.4 K/UL (ref 0–0.6)
EOSINOPHILS RELATIVE PERCENT: 2.9 %
EPITHELIAL CELLS, UA: ABNORMAL /HPF (ref 0–5)
GFR AFRICAN AMERICAN: >60
GFR NON-AFRICAN AMERICAN: >60
GLUCOSE BLD-MCNC: 145 MG/DL (ref 70–99)
GLUCOSE URINE: NEGATIVE MG/DL
HCT VFR BLD CALC: 36.8 % (ref 36–48)
HEMOGLOBIN: 11.3 G/DL (ref 12–16)
KETONES, URINE: NEGATIVE MG/DL
LEUKOCYTE ESTERASE, URINE: ABNORMAL
LYMPHOCYTES ABSOLUTE: 3.2 K/UL (ref 1–5.1)
LYMPHOCYTES RELATIVE PERCENT: 25.8 %
MCH RBC QN AUTO: 23.6 PG (ref 26–34)
MCHC RBC AUTO-ENTMCNC: 30.8 G/DL (ref 31–36)
MCV RBC AUTO: 76.7 FL (ref 80–100)
MICROSCOPIC EXAMINATION: YES
MONOCYTES ABSOLUTE: 0.7 K/UL (ref 0–1.3)
MONOCYTES RELATIVE PERCENT: 5.4 %
NEUTROPHILS ABSOLUTE: 8 K/UL (ref 1.7–7.7)
NEUTROPHILS RELATIVE PERCENT: 64.8 %
NITRITE, URINE: NEGATIVE
PDW BLD-RTO: 18.9 % (ref 12.4–15.4)
PH UA: 6.5 (ref 5–8)
PLATELET # BLD: 369 K/UL (ref 135–450)
PMV BLD AUTO: 8.4 FL (ref 5–10.5)
POTASSIUM REFLEX MAGNESIUM: 4.1 MMOL/L (ref 3.5–5.1)
PRO-BNP: 50 PG/ML (ref 0–124)
PROTEIN UA: NEGATIVE MG/DL
RBC # BLD: 4.79 M/UL (ref 4–5.2)
RBC UA: ABNORMAL /HPF (ref 0–4)
RENAL EPITHELIAL, UA: ABNORMAL /HPF (ref 0–1)
SODIUM BLD-SCNC: 137 MMOL/L (ref 136–145)
SPECIFIC GRAVITY UA: 1.01 (ref 1–1.03)
TROPONIN: <0.01 NG/ML
URINE REFLEX TO CULTURE: ABNORMAL
URINE TYPE: ABNORMAL
UROBILINOGEN, URINE: 0.2 E.U./DL
WBC # BLD: 12.3 K/UL (ref 4–11)
WBC UA: ABNORMAL /HPF (ref 0–5)

## 2021-12-22 PROCEDURE — 99284 EMERGENCY DEPT VISIT MOD MDM: CPT

## 2021-12-22 PROCEDURE — 85379 FIBRIN DEGRADATION QUANT: CPT

## 2021-12-22 PROCEDURE — 81001 URINALYSIS AUTO W/SCOPE: CPT

## 2021-12-22 PROCEDURE — 83880 ASSAY OF NATRIURETIC PEPTIDE: CPT

## 2021-12-22 PROCEDURE — 80048 BASIC METABOLIC PNL TOTAL CA: CPT

## 2021-12-22 PROCEDURE — 85025 COMPLETE CBC W/AUTO DIFF WBC: CPT

## 2021-12-22 PROCEDURE — 93005 ELECTROCARDIOGRAM TRACING: CPT | Performed by: STUDENT IN AN ORGANIZED HEALTH CARE EDUCATION/TRAINING PROGRAM

## 2021-12-22 PROCEDURE — 84484 ASSAY OF TROPONIN QUANT: CPT

## 2021-12-22 PROCEDURE — 36415 COLL VENOUS BLD VENIPUNCTURE: CPT

## 2021-12-22 PROCEDURE — 71046 X-RAY EXAM CHEST 2 VIEWS: CPT

## 2021-12-22 RX ORDER — MECLIZINE HYDROCHLORIDE 25 MG/1
25 TABLET ORAL 3 TIMES DAILY PRN
Qty: 30 TABLET | Refills: 0 | Status: SHIPPED | OUTPATIENT
Start: 2021-12-22 | End: 2022-01-01

## 2021-12-22 ASSESSMENT — ENCOUNTER SYMPTOMS
COUGH: 0
SHORTNESS OF BREATH: 1
VOMITING: 0
NAUSEA: 0
ABDOMINAL PAIN: 0
DIARRHEA: 0
VOICE CHANGE: 0
BACK PAIN: 0
CHEST TIGHTNESS: 0
SORE THROAT: 0
PHOTOPHOBIA: 0

## 2021-12-22 NOTE — TELEPHONE ENCOUNTER
From: Jad Young  To: Dr. Jodie Cho: 12/22/2021 1:39 PM EST  Subject: Dizziness     Hello, I messaged my rheumatologist because I thought I was having a side effect to my infusion. He advised to message my pcp. I have been getting really dizzy, unable to focus, weak legs, and nauseated. I've been checking my blood pressure at home   Today 157/99 pulse 94  Last night 148/133 pulse 133 (after walking through house) then 155/101  Any suggestions?

## 2021-12-23 LAB
EKG ATRIAL RATE: 77 BPM
EKG DIAGNOSIS: NORMAL
EKG P AXIS: 50 DEGREES
EKG P-R INTERVAL: 196 MS
EKG Q-T INTERVAL: 376 MS
EKG QRS DURATION: 82 MS
EKG QTC CALCULATION (BAZETT): 425 MS
EKG R AXIS: 32 DEGREES
EKG T AXIS: 29 DEGREES
EKG VENTRICULAR RATE: 77 BPM

## 2021-12-23 NOTE — ED NOTES
Bed: A05-05  Expected date:   Expected time:   Means of arrival:   Comments:  Scott Calix RN  12/22/21 Chris Holloway

## 2021-12-23 NOTE — ED PROVIDER NOTES
[address] [facility] RESIDENT NOTE       Date of evaluation: 12/22/2021    Chief Complaint     Hypertension (pt states, \"I have a machine at home and my BP has been high. My doctor told me to come here. \")      History of Present Illness     Celia Jones is a 39 y.o. female with a history of morbid obesity, MARCO, anxiety and depression, PCOS, hidradenitis on monthly infusions of inflectra (infliximab), hypertension, and hematuria who presents for evaluation of hypertension, dizziness, and lightheadedness. The patient presents for evaluation of multiple symptoms that seem to have started 2 months ago but worsened over the past week. She states that she received her first infusion of infliximab about 2 months ago and began to have some dizziness and lightheadedness after. This seemed to have resolved however a week ago, she began to notice that anytime she would get up to walk, she would feel short of breath, lightheaded, and off balance. She states that her vision feels intermittently blurry. She has no fevers or chills. She denies any swelling. She does have a significant familial heart history with multiple MIs in their 45s. She also does have a significant ear history receiving multiple rounds of tubes. She currently has tubes in her left ear. Her dizziness worsens when she turns her head and worsens when she gets up to walk. She does feel intermittent blurry vision when she is sitting down. She currently feels fatigued and has mild shortness of breath but denies chest pain. She does not have chest pain at all through these episodes. She states that she is unable to care for her children right now due to her significant disability from her dizziness and lightheadedness.   She also states that over the past week, she has noticed that her blood pressure has been higher than normal.  She spoke with her primary doctor who told her to come in to the emergency department for evaluation. She denies any recent flulike illnesses. She denies cough. She does have occasional stuffiness in her nose. She has no ear pain. Review of Systems     Review of Systems   Constitutional: Positive for activity change and fatigue. Negative for chills and fever. HENT: Negative for congestion, drooling, hearing loss, sore throat and voice change. Eyes: Positive for visual disturbance. Negative for photophobia. Respiratory: Positive for shortness of breath. Negative for cough and chest tightness. Cardiovascular: Negative for chest pain, palpitations and leg swelling. Gastrointestinal: Negative for abdominal pain, diarrhea, nausea and vomiting. Genitourinary: Negative for dysuria and frequency. Musculoskeletal: Negative for back pain and myalgias. Skin: Negative for pallor, rash and wound. Neurological: Positive for dizziness and weakness. Negative for seizures, syncope, numbness and headaches. Psychiatric/Behavioral: Negative. All other systems reviewed and are negative. Past Medical, Surgical, Family, and Social History     She has a past medical history of Endometriosis, Hypertension, Lumbar disc disorder, Nausea & vomiting, Polycystic ovarian syndrome, and Sleep apnea. She has a past surgical history that includes Tonsillectomy (1985); Ovary surgery; Cholecystectomy (2008); eye surgery (2010); Cardiac surgery; Tympanostomy tube placement; and other surgical history (01/21/14). Her family history includes Asthma in her brother; COPD in her father; Diabetes in her mother; Elevated Lipids in her sister; Hypertension in her mother; Kidney Disease in her mother; Sleep Apnea in her brother. She reports that she has never smoked. She has never used smokeless tobacco. She reports that she does not drink alcohol and does not use drugs.     Medications     Previous Medications    AMLODIPINE (NORVASC) 5 MG TABLET    TAKE 1 TABLET BY MOUTH DAILY BENZONATATE (TESSALON) 100 MG CAPSULE    Take 100 mg by mouth 3 times daily as needed    BENZOYL PEROXIDE 5 % EXTERNAL LIQUID        BLOOD GLUCOSE TEST STRIPS (ASCENSIA AUTODISC VI;ONE TOUCH ULTRA TEST VI) STRIP    1 each by In Vitro route daily As needed. BUSPIRONE (BUSPAR) 5 MG TABLET    TAKE ONE TABLET BY MOUTH TWICE A DAY    CLINDAMYCIN (CLEOCIN T) 1 % EXTERNAL SOLUTION        ESCITALOPRAM (LEXAPRO) 20 MG TABLET    TAKE ONE TABLET BY MOUTH DAILY    GLUCOSE MONITORING (FREESTYLE FREEDOM) KIT    1 kit by Does not apply route daily    INFLIXIMAB (INFLECTRA) 100 MG SOLR    Infuse intravenously every 28 days    LANCETS (ONETOUCH DELICA PLUS HLGFZQ19M) MISC    Use to test blood glucose once daily    LINACLOTIDE (LINZESS) 145 MCG CAPSULE    Take 1 capsule by mouth 2 times daily    LORATADINE (CLARITIN) 10 MG TABLET    Take 10 mg by mouth daily    METRONIDAZOLE (METROCREAM) 0.75 % CREAM    APPLY TO RASH ON FACE TWO TIMES A DAY    MUPIROCIN (BACTROBAN) 2 % OINTMENT    APPLY TO OPEN SORES TWO TIMES A DAY    NYSTATIN-TRIAMCINOLONE (MYCOLOG II) 834935-9.1 UNIT/GM-% CREAM    APPLY TOPICALLY THREE TIMES A DAY    ONDANSETRON (ZOFRAN ODT) 4 MG DISINTEGRATING TABLET    Take 1 tablet by mouth every 8 hours as needed for Nausea    ONDANSETRON (ZOFRAN) 4 MG TABLET    Take 4 mg by mouth every 8 hours as needed    PANTOPRAZOLE (PROTONIX) 40 MG TABLET    TAKE ONE TABLET BY MOUTH TWICE A DAY BEFORE MEALS    POLYETHYLENE GLYCOL (GLYCOLAX) 17 GM/SCOOP POWDER    Take 17 g by mouth daily    PREDNISONE (DELTASONE) 10 MG TABLET    Take 40 mg by mouth daily as needed    SEMAGLUTIDE, 1 MG/DOSE, (OZEMPIC, 1 MG/DOSE,) 2 MG/1.5ML SOPN    Inject 1 mg into the skin every 7 days    SILVER SULFADIAZINE (SILVADENE) 1 % CREAM    Apply 1 squirt to wound daily. Continue until seen in follow-up with physician.     SPIRONOLACTONE (ALDACTONE) 100 MG TABLET    Take 100 mg by mouth       Allergies     She is allergic to cefazolin, adhesive tape, and cinnamon. Physical Exam     INITIAL VITALS: BP: (!) 148/74, Temp: 97.5 °F (36.4 °C), Pulse: 91, Resp: 19, SpO2: 99 %   Physical Exam  Constitutional:       General: She is not in acute distress. Appearance: Normal appearance. She is not ill-appearing. HENT:      Head: Normocephalic and atraumatic. Right Ear: Tympanic membrane normal.      Ears:      Comments: Left TM with tube in place. Nose: No congestion. Mouth/Throat:      Mouth: Mucous membranes are moist.   Eyes:      General: No scleral icterus. Right eye: No discharge. Left eye: No discharge. Extraocular Movements: Extraocular movements intact. Conjunctiva/sclera: Conjunctivae normal.      Pupils: Pupils are equal, round, and reactive to light. Cardiovascular:      Rate and Rhythm: Normal rate and regular rhythm. Pulses: Normal pulses. Pulmonary:      Effort: Pulmonary effort is normal. No respiratory distress. Breath sounds: Normal breath sounds. No wheezing. Abdominal:      General: There is no distension. Palpations: Abdomen is soft. Tenderness: There is no abdominal tenderness. Musculoskeletal:         General: No swelling or deformity. Right lower leg: No edema. Left lower leg: No edema. Skin:     General: Skin is warm and dry. Capillary Refill: Capillary refill takes less than 2 seconds. Coloration: Skin is not pale. Findings: No erythema. Neurological:      Mental Status: She is alert and oriented to person, place, and time. Comments: Cranial nerves intact. No pronator drift. Normal finger-to-nose. Sensation intact in all extremities. Equal strength in arms and legs.    Psychiatric:         Mood and Affect: Mood normal.         Behavior: Behavior normal.         DiagnosticResults     EKG   Interpreted in conjunction with emergencydepartment physician No att. providers found  Rhythm: normal sinus   Rate: normal  Axis: normal  Ectopy: none  Conduction: normal  ST Segments: normal  T Waves:inversion in  III  Q Waves: III  Clinical Impression: non-specific EKG  Comparison: No T wave inversion and Q wave in lead III. Otherwise unchanged from prior EKG. RADIOLOGY:  XR CHEST (2 VW)   Final Result      No acute pulmonary disease.              LABS:   Results for orders placed or performed during the hospital encounter of 12/22/21   CBC Auto Differential   Result Value Ref Range    WBC 12.3 (H) 4.0 - 11.0 K/uL    RBC 4.79 4.00 - 5.20 M/uL    Hemoglobin 11.3 (L) 12.0 - 16.0 g/dL    Hematocrit 36.8 36.0 - 48.0 %    MCV 76.7 (L) 80.0 - 100.0 fL    MCH 23.6 (L) 26.0 - 34.0 pg    MCHC 30.8 (L) 31.0 - 36.0 g/dL    RDW 18.9 (H) 12.4 - 15.4 %    Platelets 314 370 - 197 K/uL    MPV 8.4 5.0 - 10.5 fL    Neutrophils % 64.8 %    Lymphocytes % 25.8 %    Monocytes % 5.4 %    Eosinophils % 2.9 %    Basophils % 1.1 %    Neutrophils Absolute 8.0 (H) 1.7 - 7.7 K/uL    Lymphocytes Absolute 3.2 1.0 - 5.1 K/uL    Monocytes Absolute 0.7 0.0 - 1.3 K/uL    Eosinophils Absolute 0.4 0.0 - 0.6 K/uL    Basophils Absolute 0.1 0.0 - 0.2 K/uL   Basic Metabolic Panel w/ Reflex to MG   Result Value Ref Range    Sodium 137 136 - 145 mmol/L    Potassium reflex Magnesium 4.1 3.5 - 5.1 mmol/L    Chloride 102 99 - 110 mmol/L    CO2 22 21 - 32 mmol/L    Anion Gap 13 3 - 16    Glucose 145 (H) 70 - 99 mg/dL    BUN 8 7 - 20 mg/dL    CREATININE 0.9 0.6 - 1.1 mg/dL    GFR Non-African American >60 >60    GFR African American >60 >60    Calcium 8.9 8.3 - 10.6 mg/dL   Troponin   Result Value Ref Range    Troponin <0.01 <0.01 ng/mL   Brain Natriuretic Peptide   Result Value Ref Range    Pro-BNP 50 0 - 124 pg/mL   D-Dimer, Quantitative   Result Value Ref Range    D-Dimer, Quant <200 0 - 229 ng/mL DDU   Urinalysis Reflex to Culture    Specimen: Urine, clean catch   Result Value Ref Range    Color, UA Yellow Straw/Yellow    Clarity, UA Clear Clear    Glucose, Ur Negative Negative mg/dL    Bilirubin Urine Negative Negative    Ketones, Urine Negative Negative mg/dL    Specific Gravity, UA 1.010 1.005 - 1.030    Blood, Urine TRACE-INTACT (A) Negative    pH, UA 6.5 5.0 - 8.0    Protein, UA Negative Negative mg/dL    Urobilinogen, Urine 0.2 <2.0 E.U./dL    Nitrite, Urine Negative Negative    Leukocyte Esterase, Urine TRACE (A) Negative    Microscopic Examination YES     Urine Type Voided     Urine Reflex to Culture Not Indicated    Microscopic Urinalysis   Result Value Ref Range    WBC, UA 3-5 0 - 5 /HPF    RBC, UA None seen 0 - 4 /HPF    Epithelial Cells, UA 11-20 (A) 0 - 5 /HPF    Renal Epithelial, UA 0-1 0 - 1 /HPF    Bacteria, UA 3+ (A) None Seen /HPF       ED BEDSIDE ULTRASOUND:  None    RECENT VITALS:  BP: (!) 148/74, Temp: 97.5 °F (36.4 °C), Pulse: 91,Resp: 19, SpO2: 99 %     Procedures     None    ED Course     Nursing Notes, Past Medical Hx, Past Surgical Hx, Social Hx, Allergies, and Family Hx were reviewed. The patient was given the followingmedications:  Orders Placed This Encounter   Medications    meclizine (ANTIVERT) 25 MG tablet     Sig: Take 1 tablet by mouth 3 times daily as needed for Dizziness     Dispense:  30 tablet     Refill:  0       CONSULTS:  None    MEDICAL DECISION MAKING / ASSESSMENT / Karis Schlatter is a 39 y.o. female who presents for evaluation of dizziness, lightheadedness, and fatigue 2000 present predominately for the past week. The patient arrives mildly hypertensive but otherwise hemodynamically stable. Her exam was benign. She had a normal neurologic exam with no evidence of a posterior stroke as a cause of her dizziness/lightheadedness. I have low concern for stroke at this time. EKG was nonischemic. Troponin was less than 0.01. I have low concern for ACS. D-dimer was negative making PE less likely. BNP was 50. She has no evidence of volume overload on exam and no pulmonary edema making heart failure less likely.   She has a mildly elevated white count at 12.3 but no evidence of bacterial infection. BMP showed mild hyperglycemia 145 but otherwise electrolytes are normal.  Urinalysis showed no evidence of infection. Given the patient's normal neurologic exam, her dizziness/lightheadedness is likely related to peripheral vertigo versus mild dehydration. The patient will be given a prescription for meclizine and will call her primary care doctor tomorrow to schedule an appointment to be seen later this week or early next week to have her symptoms reevaluated. She was given strict return precautions for new or worsening symptoms and was discharged in stable condition. This patient was also evaluated by the attending physician. All care plans werediscussed and agreed upon. Clinical Impression     1. Dizziness    2. Hypertension, unspecified type        Disposition     PATIENT REFERRED TO:  Veronica Marie, [facility]  [phone]    Schedule an appointment as soon as possible for a visit   Call to schedule an appointment to be seen to have your symptoms re-evaluated.       DISCHARGE MEDICATIONS:  New Prescriptions    MECLIZINE (ANTIVERT) 25 MG TABLET    Take 1 tablet by mouth 3 times daily as needed for Dizziness       DISPOSITION  - discharge        Sylvia Gutierrez MD  Resident  12/22/21 [id number]

## 2022-01-06 ENCOUNTER — OFFICE VISIT (OUTPATIENT)
Dept: ENT CLINIC | Age: 42
End: 2022-01-06
Payer: COMMERCIAL

## 2022-01-06 ENCOUNTER — OFFICE VISIT (OUTPATIENT)
Dept: PULMONOLOGY | Age: 42
End: 2022-01-06
Payer: COMMERCIAL

## 2022-01-06 VITALS
HEART RATE: 73 BPM | WEIGHT: 278 LBS | TEMPERATURE: 97.5 F | DIASTOLIC BLOOD PRESSURE: 81 MMHG | SYSTOLIC BLOOD PRESSURE: 131 MMHG | HEIGHT: 64 IN | BODY MASS INDEX: 47.46 KG/M2 | RESPIRATION RATE: 16 BRPM | OXYGEN SATURATION: 96 %

## 2022-01-06 VITALS
SYSTOLIC BLOOD PRESSURE: 141 MMHG | OXYGEN SATURATION: 99 % | DIASTOLIC BLOOD PRESSURE: 95 MMHG | HEIGHT: 64 IN | HEART RATE: 91 BPM | BODY MASS INDEX: 47.97 KG/M2 | WEIGHT: 281 LBS

## 2022-01-06 DIAGNOSIS — F32.A ANXIETY AND DEPRESSION: Chronic | ICD-10-CM

## 2022-01-06 DIAGNOSIS — H69.83 DYSFUNCTION OF BOTH EUSTACHIAN TUBES: ICD-10-CM

## 2022-01-06 DIAGNOSIS — G47.33 OBSTRUCTIVE SLEEP APNEA SYNDROME: Primary | Chronic | ICD-10-CM

## 2022-01-06 DIAGNOSIS — E66.01 MORBID OBESITY, UNSPECIFIED OBESITY TYPE (HCC): Chronic | ICD-10-CM

## 2022-01-06 DIAGNOSIS — J30.9 ALLERGIC RHINITIS, UNSPECIFIED SEASONALITY, UNSPECIFIED TRIGGER: ICD-10-CM

## 2022-01-06 DIAGNOSIS — H72.01 TYMPANIC MEMBRANE CENTRAL PERFORATION, RIGHT: Primary | ICD-10-CM

## 2022-01-06 DIAGNOSIS — F41.9 ANXIETY AND DEPRESSION: Chronic | ICD-10-CM

## 2022-01-06 DIAGNOSIS — Z45.89 TYMPANOSTOMY TUBE CHECK: ICD-10-CM

## 2022-01-06 DIAGNOSIS — I10 PRIMARY HYPERTENSION: Chronic | ICD-10-CM

## 2022-01-06 PROCEDURE — G8417 CALC BMI ABV UP PARAM F/U: HCPCS | Performed by: NURSE PRACTITIONER

## 2022-01-06 PROCEDURE — 99214 OFFICE O/P EST MOD 30 MIN: CPT | Performed by: NURSE PRACTITIONER

## 2022-01-06 PROCEDURE — G8427 DOCREV CUR MEDS BY ELIG CLIN: HCPCS | Performed by: STUDENT IN AN ORGANIZED HEALTH CARE EDUCATION/TRAINING PROGRAM

## 2022-01-06 PROCEDURE — 1036F TOBACCO NON-USER: CPT | Performed by: NURSE PRACTITIONER

## 2022-01-06 PROCEDURE — G8482 FLU IMMUNIZE ORDER/ADMIN: HCPCS | Performed by: NURSE PRACTITIONER

## 2022-01-06 PROCEDURE — G8417 CALC BMI ABV UP PARAM F/U: HCPCS | Performed by: STUDENT IN AN ORGANIZED HEALTH CARE EDUCATION/TRAINING PROGRAM

## 2022-01-06 PROCEDURE — G8482 FLU IMMUNIZE ORDER/ADMIN: HCPCS | Performed by: STUDENT IN AN ORGANIZED HEALTH CARE EDUCATION/TRAINING PROGRAM

## 2022-01-06 PROCEDURE — G8427 DOCREV CUR MEDS BY ELIG CLIN: HCPCS | Performed by: NURSE PRACTITIONER

## 2022-01-06 PROCEDURE — 1036F TOBACCO NON-USER: CPT | Performed by: STUDENT IN AN ORGANIZED HEALTH CARE EDUCATION/TRAINING PROGRAM

## 2022-01-06 PROCEDURE — 99214 OFFICE O/P EST MOD 30 MIN: CPT | Performed by: STUDENT IN AN ORGANIZED HEALTH CARE EDUCATION/TRAINING PROGRAM

## 2022-01-06 RX ORDER — FLUTICASONE PROPIONATE 50 MCG
1 SPRAY, SUSPENSION (ML) NASAL DAILY
Qty: 32 G | Refills: 1 | Status: SHIPPED | OUTPATIENT
Start: 2022-01-06

## 2022-01-06 ASSESSMENT — SLEEP AND FATIGUE QUESTIONNAIRES
ESS TOTAL SCORE: 6
HOW LIKELY ARE YOU TO NOD OFF OR FALL ASLEEP WHILE LYING DOWN TO REST IN THE AFTERNOON WHEN CIRCUMSTANCES PERMIT: 2
HOW LIKELY ARE YOU TO NOD OFF OR FALL ASLEEP WHILE SITTING AND TALKING TO SOMEONE: 0
HOW LIKELY ARE YOU TO NOD OFF OR FALL ASLEEP IN A CAR, WHILE STOPPED FOR A FEW MINUTES IN TRAFFIC: 0
HOW LIKELY ARE YOU TO NOD OFF OR FALL ASLEEP WHILE SITTING QUIETLY AFTER LUNCH WITHOUT ALCOHOL: 0
HOW LIKELY ARE YOU TO NOD OFF OR FALL ASLEEP WHILE SITTING AND READING: 1
HOW LIKELY ARE YOU TO NOD OFF OR FALL ASLEEP WHILE WATCHING TV: 1
HOW LIKELY ARE YOU TO NOD OFF OR FALL ASLEEP WHEN YOU ARE A PASSENGER IN A CAR FOR AN HOUR WITHOUT A BREAK: 2
HOW LIKELY ARE YOU TO NOD OFF OR FALL ASLEEP WHILE SITTING INACTIVE IN A PUBLIC PLACE: 0

## 2022-01-06 NOTE — PROGRESS NOTES
Carolyne Perry MD, [facility], [facility]  [address] Moreno [address]  [address] [facility], [facility] Dasha Cartagena E [phone]   [facility] Gunnar Lawson. [address] [place] [address]. [address] [phone] SLEEP MEDICINE  [address] [place] [id number] [phone]      Assessment/Plan:      1. Obstructive sleep apnea syndrome  Assessment & Plan:  Chronic-Stable: Reviewed and analyzed results of physiologic download from patient's machine and reviewed with patient. Supplies and parts as needed for her machine. These are medically necessary. Limit caffeine use after 3pm. Based on the analyzed data will continue with current settings. Stable on her machine at current settings, getting benefit from the use, and having minimal side effects. Patient to call her DME to order new headgear. Verbal and written instruction on PAP equipment cleaning and disinfection schedule provided. Will see patient back in 1 year or sooner if issues arise. 2. Primary hypertension  Assessment & Plan:   Chronic- Stable. Discussed the importance of treating obstructive sleep apnea as part of the management of this disorder. Cont any meds per PCP and other physicians. 3. Anxiety and depression  Assessment & Plan:   Chronic- Stable. Discussed the importance of treating obstructive sleep apnea as part of the management of this disorder. Cont any meds per PCP and other physicians. 4. Morbid obesity, unspecified obesity type ([address]  Assessment & Plan:  Chronic-not stable:  Discussed importance of treating obstructive sleep apnea and getting sufficient sleep to assist with weight control. Encouraged her to work on weight loss through diet and exercise. Recommended DASH or Mediterranean diets. Reviewed, analyzed, and documented physiologic data from patient's PAP machine.     This information was analyzed to assess complexity and medical decision making in regards to further testing and management. The primary encounter diagnosis was Obstructive sleep apnea syndrome. Diagnoses of Primary hypertension, Anxiety and depression, and Morbid obesity, unspecified obesity type ([address] were also pertinent to this visit. The chronic medical conditions listed are directly related to the primary diagnosis listed above. The management of the primary diagnosis affects the secondary diagnosis and vice versa. Subjective:   Subjective   Patient ID: Sadie Cifuentes is a 39 y.o. female. Chief Complaint   Patient presents with    Sleep Apnea       HPI:  Machine Modem/Download Info:  Compliance (hours/night): 9.11 hrs/night  % of nights >= 4 hrs: 100 %  Download AHI (/hour): 0.3 /HR  Average CPAP Pressure : 15.3 cmH2O      APAP - Settings  Pressure Min: 14 cmH2O  Pressure Max: 20 cmH2O                 Comfort Settings  Heated Tubing (Yes/No): Yes  Flex/EPR (0-3): 3 PAP Mask  Mask Type: Nasal mask  Clinically Relevant Leak: No     Jesse Pope report she is doing well with her machine. Pressure feels good and she is waking rested. She noticed she is less sleepy during the day since her pressure was increased after her last visit. Her  also started sleeping in a different room which has helped decrease the interruptions in her sleep. Her daughter has told her once that she heard her snore. Patients leak on her mask increased in December and reports she has not been able to get a new headgear from her DME due to them being out of stock. she denies headaches, congestion, nosebleeds, dryness, aerophagia, or drowsiness while driving.     D[facility]    [place] - Total score: 6    Social History     Socioeconomic History    Marital status:      Spouse name: Not on file    Number of children: 0    Years of education: Not on file    Highest education level: Not on file   Occupational History    Occupation:    Tobacco Use    Smoking status: Never Smoker    Smokeless tobacco: Never Used   Substance and Sexual Activity    Alcohol use: No    Drug use: No    Sexual activity: Not Currently     Partners: Male   Other Topics Concern    Not on file   Social History Narrative    Not on file     Social Determinants of Health     Financial Resource Strain: Low Risk     Difficulty of Paying Living Expenses: Not hard at all   Food Insecurity: No Food Insecurity    Worried About Running Out of Food in the Last Year: Never true    [place] of Food in the Last Year: Never true   Transportation Needs: No Transportation Needs    Lack of Transportation (Medical): No    Lack of Transportation (Non-Medical): No   Physical Activity:     Days of Exercise per Week: Not on file    Minutes of Exercise per Session: Not on file   Stress:     Feeling of Stress : Not on file   Social Connections:     Frequency of Communication with Friends and Family: Not on file    Frequency of Social Gatherings with Friends and Family: Not on file    Attends [employer] Services: Not on file    Active Member of [address] [place] [address] or Organizations: Not on file    Attends Club or Organization Meetings: Not on file    Marital Status: Not on file   Intimate Partner Violence:     Fear of Current or Ex-Partner: Not on file    Emotionally Abused: Not on file    Physically Abused: Not on file    Sexually Abused: Not on file   Housing Stability:     Unable to Pay for Housing in the Last Year: Not on file    Number of [place] in the Last Year: Not on file    Unstable Housing in the Last Year: Not on file       Current Outpatient Medications   Medication Instructions    amLODIPine (NORVASC) 5 MG tablet TAKE 1 TABLET BY MOUTH DAILY    benzonatate (TESSALON) 100 mg, Oral, 3 TIMES DAILY PRN    benzoyl peroxide 5 % external liquid No dose, route, or frequency recorded.     blood glucose test strips (ASCENSIA AUTODISC VI;ONE TOUCH ULTRA TEST VI) strip 1 each, In Vitro, DAILY, As needed.  busPIRone (BUSPAR) 5 MG tablet TAKE ONE TABLET BY MOUTH TWICE A DAY    clindamycin (CLEOCIN T) 1 % external solution No dose, route, or frequency recorded.  escitalopram (LEXAPRO) 20 MG tablet TAKE ONE TABLET BY MOUTH DAILY    fluticasone (FLONASE) 50 MCG/ACT nasal spray 1 spray, Each Nostril, DAILY    glucose monitoring (FREESTYLE FREEDOM) kit 1 kit, Does not apply, DAILY    inFLIXimab (INFLECTRA) 100 MG SOLR IntraVENous, EVERY 28 DAYS    Lancets (ONETOUCH DELICA PLUS [id number]) MISC Use to test blood glucose once daily    linaclotide (LINZESS) 145 mcg, Oral, 2 TIMES DAILY    loratadine (CLARITIN) 10 mg, Oral, DAILY    metroNIDAZOLE (METROCREAM) 0.75 % cream APPLY TO RASH ON FACE TWO TIMES A DAY    mupirocin (BACTROBAN) 2 % ointment APPLY TO OPEN SORES TWO TIMES A DAY    nystatin-triamcinolone (MYCOLOG II) 733717-3.1 UNIT/GM-% cream APPLY TOPICALLY THREE TIMES A DAY    ondansetron (ZOFRAN ODT) 4 mg, Oral, EVERY 8 HOURS PRN    ondansetron (ZOFRAN) 4 mg, Oral, EVERY 8 HOURS PRN    Ozempic (1 MG/DOSE) 1 mg, SubCUTAneous, EVERY 7 DAYS    pantoprazole (PROTONIX) 40 MG tablet TAKE ONE TABLET BY MOUTH TWICE A DAY BEFORE MEALS    polyethylene glycol (GLYCOLAX) 17 g, Oral, DAILY    predniSONE (DELTASONE) 40 mg, Oral, DAILY PRN    silver sulfADIAZINE (SILVADENE) 1 % cream Apply 1 squirt to wound daily. Continue until seen in follow-up with physician.  spironolactone (ALDACTONE) 100 mg, Oral          Vitals:  Weight BMI   Wt Readings from Last 3 Encounters:   01/06/22 281 lb (127.5 kg)   01/06/22 278 lb (126.1 kg)   11/24/21 274 lb (124.3 kg)    Body mass index is 48.23 kg/m².      BP HR SaO2   BP Readings from Last 3 Encounters:   01/06/22 (!) 141/95   01/06/22 131/81   12/22/21 (!) 152/99    Pulse Readings from Last 3 Encounters:   01/06/22 91   01/06/22 73   12/22/21 87    SpO2 Readings from Last 3 Encounters:   01/06/22 99%   01/06/22 96%   12/22/21 97%        Electronically signed by IOANA Moran on 1/6/2022 at 1:17 PM

## 2022-01-06 NOTE — ASSESSMENT & PLAN NOTE
Chronic-Stable: Reviewed and analyzed results of physiologic download from patient's machine and reviewed with patient. Supplies and parts as needed for her machine. These are medically necessary. Limit caffeine use after 3pm. Based on the analyzed data will continue with current settings. Stable on her machine at current settings, getting benefit from the use, and having minimal side effects. Patient to call her DME to order new headgear. Verbal and written instruction on PAP equipment cleaning and disinfection schedule provided. Will see patient back in 1 year or sooner if issues arise.

## 2022-01-06 NOTE — PROGRESS NOTES
Diagnosis: [x] MARCO (G47.33) [] CSA (G47.31) [] Apnea (G47.30)   Length of Need: [x] 15 Months [] 99 Months [] Other:   Machine (BEBETO!): [] Respironics Dream Station      Auto [] ResMed AirSense     Auto [] Other:     []  CPAP () [] Bilevel ()   Mode: [] Auto [] Spontaneous    Mode: [] Auto [] Spontaneous            Comfort Settings:      Humidifier: [] Heated ()        [x] Water chamber replacement ()/ 1 per 6 months        Mask:   [x] Nasal () /1 per 3 months [] Full Face () /1 per 3 months   [x] Patient choice -Size and fit mask [] Patient Choice - Size and fit mask   [] Dispense: [] Dispense:   [x] Headgear () / 1 per 3 months [] Headgear () / 1 per 3 months   [x] Replacement Nasal Cushion ()/2 per month [] Interface Replacement ()/1 per month   [] Replacement Nasal Pillows ()/2 per month         Tubing: [x] Heated ()/1 per 3 months    [] Standard ()/1 per 3 months [] Other:           Filters: [x] Non-disposable ()/1 per 6 months     [x] Ultra-Fine, Disposable ()/2 per month        Miscellaneous: [] Chin Strap ()/ 1 per 6 months [] O2 bleed-in:        LPM   [] Oxymetry on CPAP/Bilevel []  Other:         Start Order Date: 01/06/22    MEDICAL JUSTIFICATION:  I, the undersigned, certify that the above prescribed supplies are medically necessary for this patients wellbeing. In my opinion, the supplies are both reasonable and necessary in reference to accepted standards of medicalpractice in treatment of this patients condition. Maya Floyd NP    NPI: [id number]       Order Signed Date: 01/06/22  [address] [facility]  Pulmonary, Sleep, and Critical Care    Pulmonary, Sleep, and Critical Care  [address] [place] [address].  [address], [address] [facility] , [facility]  Phone: [phone]    Fax:  [facility]  1980  [address] [facility]  [phone] (home)   [phone] (mobile)      Insurance Info (confirm with patient if correct):  Payor/Plan Subscr  Sex Relation Sub.  Ins. ID Effective Group Num No Vaccines Administered.

## 2022-01-06 NOTE — PROGRESS NOTES
[address] VISIT      Patient Name: Debi Berger  Medical Record Number:  <[id number]>  Primary Care Physician:  Jaylen Nowak MD    ChiefComplaint     Chief Complaint   Patient presents with    Ear Problem     ? missing right ear tube       History of Present Illness     Debi Berger is an 39 y.o. female previously seen for thyroid nodule, eustachian tube dysfunction status post tympanostomy tube placement. Interval History:   Recently has been having dizziness secondary to high blood pressure issues. Denies room spinning vertigo. She has had vertigo issues in the past and this is nothing like her prior episodes. She was evalu ated emergency room and was told that her ear tube on the right was missing. Has mild intermittent otalgia of the right ear. No recent hearing changes. Denies otorrhea. Her dizziness is overall significantly improved. Tubes placed on both ears on and off since age 11 (Ad - at leats 7 tubes, As - 2 tubes total). Last set was approximately 2 years ago by Dr. Sarah Rodgers [facility]). Denies any hearing changes since her last audiogram on 3/24/2021.     Past Medical History     Past Medical History:   Diagnosis Date    Endometriosis     Hypertension     Lumbar disc disorder     Nausea & vomiting     Polycystic ovarian syndrome     Sleep apnea 1/22/2014       Past Surgical History     Past Surgical History:   Procedure Laterality Date    CARDIAC SURGERY      cardia cath-no blockage    CHOLECYSTECTOMY  2008    EYE SURGERY  2010    detached retina    OTHER SURGICAL HISTORY  01/21/14    L4-5 BILATERAL LAMINECTOMY AND DISCECTOMY    OVARY SURGERY      multiple times    TONSILLECTOMY  1985    TYMPANOSTOMY TUBE PLACEMENT         Family History     Family History   Problem Relation Age of Onset    Kidney Disease Mother         CKD    Hypertension Mother     Diabetes Mother     COPD Father    [facility] Ast[facility] Brother     Sleep Apnea Brother     Elevated Lipids Sister         hypertriglyceridemia       Social History     Social History     Tobacco Use    Smoking status: Never Smoker    Smokeless tobacco: Never Used   Substance Use Topics    Alcohol use: No    Drug use: No        Allergies     Allergies   Allergen Reactions    Cefazolin Anaphylaxis    Adhesive Tape      Skin reddens, use paper tape    Cinnamon Rash       Medications     Current Outpatient Medications   Medication Sig Dispense Refill    fluticasone (FLONASE) 50 MCG/ACT nasal spray 1 spray by Each Nostril route daily 32 g 1    blood glucose test strips (ASCENSIA AUTODISC VI;ONE TOUCH ULTRA TEST VI) strip 1 each by In Vitro route daily As needed.  100 each 3    glucose monitoring (FREESTYLE FREEDOM) kit 1 kit by Does not apply route daily 1 kit 0    busPIRone (BUSPAR) 5 MG tablet TAKE ONE TABLET BY MOUTH TWICE A DAY 60 tablet 2    amLODIPine (NORVASC) 5 MG tablet TAKE 1 TABLET BY MOUTH DAILY 30 tablet 3    spironolactone (ALDACTONE) 100 MG tablet Take 100 mg by mouth      ondansetron (ZOFRAN ODT) 4 MG disintegrating tablet Take 1 tablet by mouth every 8 hours as needed for Nausea 20 tablet 0    benzonatate (TESSALON) 100 MG capsule Take 100 mg by mouth 3 times daily as needed      benzoyl peroxide 5 % external liquid       clindamycin (CLEOCIN T) 1 % external solution       escitalopram (LEXAPRO) 20 MG tablet TAKE ONE TABLET BY MOUTH DAILY      Lancets (ONETOUCH DELICA PLUS [id number]) MISC Use to test blood glucose once daily      loratadine (CLARITIN) 10 MG tablet Take 10 mg by mouth daily      metroNIDAZOLE (METROCREAM) 0.75 % cream APPLY TO RASH ON FACE TWO TIMES A DAY      mupirocin (BACTROBAN) 2 % ointment APPLY TO OPEN SORES TWO TIMES A DAY      nystatin-triamcinolone (MYCOLOG II) 288392-2.1 UNIT/GM-% cream APPLY TOPICALLY THREE TIMES A DAY      ondansetron (ZOFRAN) 4 MG tablet Take 4 mg by mouth every 8 hours as needed  pantoprazole (PROTONIX) 40 MG tablet TAKE ONE TABLET BY MOUTH TWICE A DAY BEFORE MEALS      polyethylene glycol (GLYCOLAX) 17 GM/SCOOP powder Take 17 g by mouth daily      Semaglutide, 1 MG/DOSE, (OZEMPIC, 1 MG/DOSE,) 2 MG/1.5ML SOPN Inject 1 mg into the skin every 7 days      silver sulfADIAZINE (SILVADENE) 1 % cream Apply 1 squirt to wound daily. Continue until seen in follow-up with physician.  predniSONE (DELTASONE) 10 MG tablet Take 40 mg by mouth daily as needed      inFLIXimab (INFLECTRA) 100 MG SOLR Infuse intravenously every 28 days      linaclotide (LINZESS) 145 MCG capsule Take 1 capsule by mouth 2 times daily 60 capsule 1     No current facility-administered medications for this visit. Review of Systems     REVIEW OF SYSTEMS  The following systems were reviewed and revealed the following in addition to any already discussed in the HPI:    CONSTITUTIONAL: no weight loss, no fever, no night sweats, no chills  EYES: no vision changes, no blurry vision  EARS: See HPI  NOSE: no epistaxis, no rhinorrhea  THROAT: No voice changes, no sore throat, no dysphagia    PhysicalExam     Vitals:    01/06/22 0854   BP: 131/81   Site: Right Lower Arm   Position: Sitting   Cuff Size: Large Adult   Pulse: 73   Resp: 16   Temp: 97.5 °F (36.4 °C)   TempSrc: Temporal   SpO2: 96%   Weight: 278 lb (126.1 kg)   Height: 5' 4\" (1.626 m)       PHYSICAL EXAM  /81 (Site: Right Lower Arm, Position: Sitting, Cuff Size: Large Adult)   Pulse 73   Temp 97.5 °F (36.4 °C) (Temporal)   Resp 16   Ht 5' 4\" (1.626 m)   Wt 278 lb (126.1 kg)   SpO2 96%   BMI 47.72 kg/m²     GENERAL: No acute distress, alert and oriented, no hoarseness  EYES: EOMI, Anti-icteric  NOSE: On anterior rhinoscopy there is no epistaxis, nasal mucosa moist and normal appearing, no purulent drainage.    EARS: Normal external appearance; on portable otomicroscopy:     -Ad: External auditory canal without stenosis, tympanic membrane with pinpoint perforation with clear margins centrally in pars tensa without otorrhea, no middle ear effusions or retractions     -As: External auditory canal without stenosis, tympanic membrane with tympanostomy tube in posterior inferior quadrant, no middle ear effusions or retractions  FACE: HB 1/6 bilaterally, symmetric appearing, sensation equal bilaterally  ORAL CAVITY: No masses or lesions visualized or palpated, uvula is midline, moist mucous membranes  NECK: Normal range of motion, no thyromegaly, trachea is midline, no palpable lymphadenopathy or neck masses, no crepitus  CHEST: Normal respiratory effort, breathing comfortably, no retractions  SKIN: No rashes, normal appearing skin, no evidence of skin lesions/tumors  NEURO: Cranial Nerves 2, 3, 4, 5, 6, 7, 11, 12 intact bilaterally     I have performed a head and neck physical exam personally or was physically present during the key or critical portions of the service. Data/Imaging Review     EXAM: THYROID ULTRASOUND       INDICATION: Thyroid nodule       COMPARISON: 3/22/2021       FINDINGS:       RIGHT LOBE: 3.8 x 2.2 x 1.2 cm. There has been no significant change in the hypoechoic solid nodule inferiorly which was previously biopsied measuring 1 x 0.9 x 0.7 cm, previously 0.9 x 0.9 x 0.6 cm.        LEFT LOBE: 3.7 x 1.4 x 1.2 cm. No nodules.       ISTHMUS: 0.3 cm. No nodules.       OTHER FINDINGS: None.           Impression       No significant change in the hypoechoic right thyroid nodule which was previously biopsied. No new suspicious nodules. Assessment and Plan     1. Tympanic membrane central perforation, right  -Likely secondary to recent tympanostomy tube extrusion. Would hold off on repair at this time given underlying eustachian tube dysfunction and lack of otorrhea. 2. Dysfunction of both eustachian tubes  -Start Flonase daily    3. Allergic rhinitis, unspecified seasonality, unspecified trigger  -Continue Claritin    4.

## 2022-02-09 DIAGNOSIS — I10 ESSENTIAL HYPERTENSION: ICD-10-CM

## 2022-02-09 RX ORDER — AMLODIPINE BESYLATE 5 MG/1
TABLET ORAL
Qty: 30 TABLET | Refills: 1 | Status: SHIPPED | OUTPATIENT
Start: 2022-02-09 | End: 2022-04-27

## 2022-02-09 NOTE — TELEPHONE ENCOUNTER
Medication:   Requested Prescriptions     Pending Prescriptions Disp Refills    amLODIPine (NORVASC) 5 MG tablet [Pharmacy Med Name: amLODIPine BESYLATE 5 MG TAB] 30 tablet 3     Sig: TAKE ONE TABLET BY MOUTH DAILY        Last Filled:      Patient Phone Number: [phone] (home)     Last appt: 11/24/2021   Next appt: Visit date not found    Last OARRS: No flowsheet data found. Start Amlodipine 10 mg daily for blood pressure  Low sodium diet  Come back in 2 weeks   Patient Education     Established High Blood Pressure    High blood pressure (hypertension) is a long-term (chronic) disease. Often healthcare providers don’t know what causes it. But it can be caused by certain health conditions and medicines.  If you have high blood pressure, you may not have any symptoms. If you do have symptoms, they may include:  · Headache  · Dizziness  · Changes in your vision  · Chest pain  · Shortness of breath  But even without symptoms, high blood pressure that’s not treated raises your risk for heart attack, heart failure, kidney disease, and stroke. High blood pressure is a serious health risk and shouldn’t be ignored.  Blood pressure measurements are given as 2 numbers. Systolic blood pressure is the upper number. This is the pressure when the heart contracts. Diastolic blood pressure is the lower number. This is the pressure when the heart relaxes between beats. You will see your blood pressure readings written together. For example, a person with a systolic pressure of 118 and a diastolic pressure of 78 will have 118/78 written in the medical record.  Blood pressure is classified as normal, raised (elevated) or stage 1 or stage 2 high blood pressure:  · Normal blood pressure. Systolic of less than 120 and diastolic of less than 80 (120/80).  · Elevated blood pressure. Systolic of 120 to 129 and diastolic less than 80.  · Stage 1 high blood pressure. Systolic is 130 to 139 or diastolic between 80 to 89.  · Stage 2 high blood pressure. Systolic is 140 or higher or the diastolic is 90 or higher.  Home care  If you have high blood pressure, follow these home care guidelines to help lower your blood pressure. If you are taking medicines for high blood pressure, these methods may reduce or end your need for medicines in the future.  · Start a weight-loss program if you are overweight.  · Cut back on  how much salt you get in your diet. Here’s how to do this:  ? Don’t eat foods that have a lot of salt. These include olives, pickles, smoked meats, and salted potato chips.  ? Don’t add salt to your food at the table.  ? Use only small amounts of salt when cooking.  · Start an exercise program. Talk with your healthcare provider about the type of exercise program that would be best for you. It doesn't have to be hard. Even brisk walking for 20 minutes 3 times a week is a good form of exercise.  · Don’t take medicines that stimulate the heart. This includes many over-the-counter cold and sinus decongestant pills and sprays, as well as diet pills. Check the warnings about high blood pressure on the label. Before buying any over-the-counter medicines or supplements, always ask the pharmacist about the product's possible interaction with your high blood pressure and your high blood pressure medicines.  · Stimulants such as amphetamine or cocaine could be deadly for someone with high blood pressure. Never take these.  · Limit how much caffeine you get in your diet. Switch to caffeine-free products.  · Stop smoking. If you are a long-time smoker, this can be hard. Talk with your healthcare provider about medicines and nicotine replacement options to help you. Also join a stop-smoking program . This makes it more likely that you will quit for good.  · Learn how to handle stress. This is an important part of any program to lower blood pressure. Learn about relaxation methods such as meditation, yoga, or biofeedback.  · If your provider prescribed medicines, take them exactly as directed. Missing doses may cause your blood pressure get out of control.  · If you miss a dose, check with your healthcare provider or pharmacist about what to do.  · Think about buying an automatic blood pressure machine to check your blood pressure at home. Ask your provider for a recommendation. You can get one of these at most pharmacies.  Using  a home blood pressure monitor  The American Heart Association advises the following guidelines for home blood pressure monitoring:  · Don't smoke or drink coffee for 30 minutes before taking your blood pressure.  · Go to the bathroom before the test.  · Relax for 5 minutes before taking the measurement.  · Sit with your back supported (don't sit on a couch or soft chair). Keep your feet on the floor uncrossed. Place your arm on a solid flat surface (such as a table) with the upper part of the arm at heart level. Place the middle of the cuff directly above the bend of the elbow. Check the monitor's instruction manual for an illustration.  · Take multiple readings. When you measure, take 2 to 3 readings one minute apart and record all of the results.  · Take your blood pressure at the same time every day, or as your healthcare provider advises.  · Record the date, time, and blood pressure reading.  · Take the record with you to your next healthcare appointment. If your blood pressure monitor has a built-in memory, just take the monitor with you to your next appointment.  · Call your provider if you have several high readings. Don't be frightened by one high blood pressure reading. But if you get a few high readings, check in with your healthcare provider.  Follow-up care  You will need to see your healthcare provider regularly. This is to check your blood pressure and to make changes to your medicines. Make a follow-up appointment as directed. Bring the record of your home blood pressure readings to the appointment.  Call 911  Call 911 if you have any of these:  · Blood pressure of 180/120 or higher  · Chest pain or shortness of breath  · Weakness of an arm or leg or one side of the face  · Problems speaking or seeing     When to get medical advice  Call your healthcare provider right away if any of these occur:  · Severe headache  · Throbbing or rushing sound in the ears  · Nosebleed  · Sudden severe pain in your  belly (abdomen)  · Extreme drowsiness, confusion, or fainting  · Dizziness or spinning feeling (vertigo)  Nguy[employer]n last reviewed this educational content on 7/1/2019  © 9452-0294 [employer]. All rights reserved. This information is not intended as a substitute for professional medical care. Always follow your healthcare professional's instructions.

## 2022-04-27 DIAGNOSIS — I10 ESSENTIAL HYPERTENSION: ICD-10-CM

## 2022-04-27 RX ORDER — AMLODIPINE BESYLATE 5 MG/1
TABLET ORAL
Qty: 30 TABLET | Refills: 1 | Status: SHIPPED | OUTPATIENT
Start: 2022-04-27 | End: 2022-08-04

## 2022-04-27 NOTE — TELEPHONE ENCOUNTER
Medication:   Requested Prescriptions     Pending Prescriptions Disp Refills    amLODIPine (NORVASC) 5 MG tablet [Pharmacy Med Name: amLODIPine BESYLATE 5 MG TAB] 30 tablet 1     Sig: TAKE ONE TABLET BY MOUTH DAILY        Last Filled:  02/09/2022    Patient Phone Number: [phone] (home)     Last appt: 11/24/2021   Next appt: Visit date not found    Last OARRS: No flowsheet data found.

## 2022-05-02 ENCOUNTER — OFFICE VISIT (OUTPATIENT)
Dept: PRIMARY CARE CLINIC | Age: 42
End: 2022-05-02
Payer: COMMERCIAL

## 2022-05-02 VITALS
TEMPERATURE: 97.7 F | OXYGEN SATURATION: 97 % | SYSTOLIC BLOOD PRESSURE: 134 MMHG | WEIGHT: 278 LBS | DIASTOLIC BLOOD PRESSURE: 88 MMHG | HEIGHT: 64 IN | RESPIRATION RATE: 16 BRPM | BODY MASS INDEX: 47.46 KG/M2 | HEART RATE: 76 BPM

## 2022-05-02 DIAGNOSIS — F32.A ANXIETY AND DEPRESSION: ICD-10-CM

## 2022-05-02 DIAGNOSIS — L73.2 HIDRADENITIS SUPPURATIVA: ICD-10-CM

## 2022-05-02 DIAGNOSIS — K58.1 IRRITABLE BOWEL SYNDROME WITH CONSTIPATION: ICD-10-CM

## 2022-05-02 DIAGNOSIS — F41.9 ANXIETY AND DEPRESSION: ICD-10-CM

## 2022-05-02 DIAGNOSIS — I10 ESSENTIAL HYPERTENSION: Primary | ICD-10-CM

## 2022-05-02 PROCEDURE — 1036F TOBACCO NON-USER: CPT | Performed by: FAMILY MEDICINE

## 2022-05-02 PROCEDURE — G8427 DOCREV CUR MEDS BY ELIG CLIN: HCPCS | Performed by: FAMILY MEDICINE

## 2022-05-02 PROCEDURE — 99214 OFFICE O/P EST MOD 30 MIN: CPT | Performed by: FAMILY MEDICINE

## 2022-05-02 PROCEDURE — G8417 CALC BMI ABV UP PARAM F/U: HCPCS | Performed by: FAMILY MEDICINE

## 2022-05-02 RX ORDER — BUSPIRONE HYDROCHLORIDE 10 MG/1
TABLET ORAL
Qty: 60 TABLET | Refills: 2 | Status: SHIPPED | OUTPATIENT
Start: 2022-05-02 | End: 2022-09-08

## 2022-05-02 RX ORDER — PREDNISONE 10 MG/1
40 TABLET ORAL DAILY PRN
Status: CANCELLED | OUTPATIENT
Start: 2022-05-02

## 2022-05-02 RX ORDER — LIDOCAINE AND PRILOCAINE 25; 25 MG/G; MG/G
CREAM TOPICAL
COMMUNITY
Start: 2022-02-09

## 2022-05-02 SDOH — ECONOMIC STABILITY: FOOD INSECURITY: WITHIN THE PAST 12 MONTHS, THE FOOD YOU BOUGHT JUST DIDN'T LAST AND YOU DIDN'T HAVE MONEY TO GET MORE.: NEVER TRUE

## 2022-05-02 SDOH — ECONOMIC STABILITY: FOOD INSECURITY: WITHIN THE PAST 12 MONTHS, YOU WORRIED THAT YOUR FOOD WOULD RUN OUT BEFORE YOU GOT MONEY TO BUY MORE.: NEVER TRUE

## 2022-05-02 ASSESSMENT — ANXIETY QUESTIONNAIRES
2. NOT BEING ABLE TO STOP OR CONTROL WORRYING: 1
GAD7 TOTAL SCORE: 9
3. WORRYING TOO MUCH ABOUT DIFFERENT THINGS: 1
7. FEELING AFRAID AS IF SOMETHING AWFUL MIGHT HAPPEN: 0
5. BEING SO RESTLESS THAT IT IS HARD TO SIT STILL: 1
4. TROUBLE RELAXING: 2
6. BECOMING EASILY ANNOYED OR IRRITABLE: 2
1. FEELING NERVOUS, ANXIOUS, OR ON EDGE: 2
IF YOU CHECKED OFF ANY PROBLEMS ON THIS QUESTIONNAIRE, HOW DIFFICULT HAVE THESE PROBLEMS MADE IT FOR YOU TO DO YOUR WORK, TAKE CARE OF THINGS AT HOME, OR GET ALONG WITH OTHER PEOPLE: SOMEWHAT DIFFICULT

## 2022-05-02 ASSESSMENT — ENCOUNTER SYMPTOMS
SORE THROAT: 0
DIARRHEA: 0
ABDOMINAL PAIN: 1
SHORTNESS OF BREATH: 0
VOMITING: 0
BLOOD IN STOOL: 0
COUGH: 0
NAUSEA: 0

## 2022-05-02 ASSESSMENT — PATIENT HEALTH QUESTIONNAIRE - PHQ9
SUM OF ALL RESPONSES TO PHQ9 QUESTIONS 1 & 2: 2
6. FEELING BAD ABOUT YOURSELF - OR THAT YOU ARE A FAILURE OR HAVE LET YOURSELF OR YOUR FAMILY DOWN: 0
4. FEELING TIRED OR HAVING LITTLE ENERGY: 1
10. IF YOU CHECKED OFF ANY PROBLEMS, HOW DIFFICULT HAVE THESE PROBLEMS MADE IT FOR YOU TO DO YOUR WORK, TAKE CARE OF THINGS AT HOME, OR GET ALONG WITH OTHER PEOPLE: 1
8. MOVING OR SPEAKING SO SLOWLY THAT OTHER PEOPLE COULD HAVE NOTICED. OR THE OPPOSITE, BEING SO FIGETY OR RESTLESS THAT YOU HAVE BEEN MOVING AROUND A LOT MORE THAN USUAL: 0
2. FEELING DOWN, DEPRESSED OR HOPELESS: 1
5. POOR APPETITE OR OVEREATING: 2
SUM OF ALL RESPONSES TO PHQ QUESTIONS 1-9: 8
9. THOUGHTS THAT YOU WOULD BE BETTER OFF DEAD, OR OF HURTING YOURSELF: 0
SUM OF ALL RESPONSES TO PHQ QUESTIONS 1-9: 8
SUM OF ALL RESPONSES TO PHQ QUESTIONS 1-9: 8
1. LITTLE INTEREST OR PLEASURE IN DOING THINGS: 1
3. TROUBLE FALLING OR STAYING ASLEEP: 2
SUM OF ALL RESPONSES TO PHQ QUESTIONS 1-9: 8
7. TROUBLE CONCENTRATING ON THINGS, SUCH AS READING THE NEWSPAPER OR WATCHING TELEVISION: 1

## 2022-05-02 ASSESSMENT — SOCIAL DETERMINANTS OF HEALTH (SDOH): HOW HARD IS IT FOR YOU TO PAY FOR THE VERY BASICS LIKE FOOD, HOUSING, MEDICAL CARE, AND HEATING?: NOT HARD AT ALL

## 2022-05-02 NOTE — PROGRESS NOTES
Chief Complaint   Patient presents with    Hypertension     follow up visit -     Anxiety     increased anxiety         Reatha Hatchet is a 39 y.o. female who presents for routine visit regarding HTN and anxiety/depression. Pt did get cystoscopy done which was normal    Pt has a hx of psoriatic arthritis and is followed by Rheumatology and is on IV inflectra medication and had recent CBC and CMP bloodwork done 01/11/2022 which was WNL except for a mildly elevated WBC 11.7 and platelets 953  Pt is due for her next infusion tomorrow     Pt has a hx of Hidradenitis suppurativa since she was a teenager and is followed by [facility] and has had multiple surgeries (most recently 01/2020) along with an episode sepsis 2018. 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 Pt is with good control on IV inflectra medication and does take bursts of prednisone as needed recent flareups and also gets kenalog injections into lesions by Dermatology      Patient has a past medical history significant for HTN and is on norvasc 5 mg daily     Pt is on miralax and protonix for irritable bowel and is followed by GI      Pt is on lexapro and buspar for anxiety/depression with good control. Pt is followed by Weight Management and is on ozempic medication     Pt has a hx of PCOS and endometriosis and is followed by OB/Gyne and is on mirena medication      Pt is a nonsmoker and denies alcohol use; Pt has a certified medical marijuana card    FHx positive for early CAD (Father and PUncle)    Pt did complete her COVID vaccine series       Review of Systems   Constitutional: Positive for fatigue. Negative for fever. HENT: Negative for nosebleeds and sore throat. Eyes:        Negative blurred vision or diplopia   Respiratory: Negative for cough and shortness of breath. Cardiovascular: Positive for leg swelling (chronic and pt reports normal LE venous dopplers 2 years ago).  Negative for chest pain and palpitations. Gastrointestinal: Positive for abdominal pain (crampy at times (chronic)). Negative for blood in stool, diarrhea, nausea and vomiting. Negative melena or indigestion   Endocrine: Negative for polyuria. Genitourinary: Positive for hematuria (at times). Negative for dysuria. Musculoskeletal: Positive for arthralgias. Skin: Positive for rash (psoriatic at times and recent flareup of her hidradenitis suppurativa in her L axilla). Neurological: Negative for dizziness, seizures, syncope, speech difficulty, weakness and headaches. Psychiatric/Behavioral: Negative for dysphoric mood. The patient is nervous/anxious. Vitals:    05/02/22 1352   BP: 134/88   Pulse: 76   Resp: 16   Temp: 97.7 °F (36.5 °C)   SpO2: 97%         Physical Exam  Vitals reviewed. Constitutional:       General: She is not in acute distress. Appearance: She is obese. HENT:      Mouth/Throat:      Mouth: Mucous membranes are moist.      Pharynx: Oropharynx is clear. Eyes:      General: No scleral icterus. Comments: Pink conjunctivae    Neck:      Thyroid: No thyromegaly. Comments: No carotid bruits noted B/L  Cardiovascular:      Heart sounds: Normal heart sounds. No murmur heard. No friction rub. No gallop. Pulmonary:      Effort: Pulmonary effort is normal.      Breath sounds: Normal breath sounds. Abdominal:      Palpations: Abdomen is soft. Tenderness: There is no abdominal tenderness. There is no guarding or rebound. Musculoskeletal:      Comments: Positive  2+ leg edema noted B/L   Lymphadenopathy:      Cervical: No cervical adenopathy. Skin:     Findings: No rash. Neurological:      Mental Status: She is alert. Comments: Cranial nerves 2 - 12 grossly intact; Muscle strength 5/5 throughout   Psychiatric:         Mood and Affect: Mood normal.         ASSESSMENT AND PLAN    1.  Anxiety and depression  Will continue lexapro and increase buspar dosage for better control and patient was told to notify MD if you note no improvement in 1 -2 weeks  - busPIRone (BUSPAR) 10 MG tablet; TAKE ONE TABLET BY MOUTH TWICE A DAY  Dispense: 60 tablet; Refill: 2    2. Hidradenitis suppurativa  Patient is being managed by Dermatology    3. Essential hypertension  Patient clinically stable on present medications and denies any CP or SOB and had recent normal CMP bloodwork    4. Irritable bowel syndrome with constipation  Patient clinically stable on present medications and is with a nontender abdomen on PE        Toña Lyle MD      Return in about 4 months (around 9/2/2022). Patient Instructions       Patient Education      Go to the ER ASAP if you develop any chest pain or shortness of breath! Anxiety Disorder: Care Instructions  Your Care Instructions     Anxiety is a normal reaction to stress. Difficult situations can cause you to have symptoms such as sweaty palms and a nervous feeling. In an anxiety disorder, the symptoms are far more severe. Constant worry, muscle tension, trouble sleeping, nausea and diarrhea, and other symptoms can make normal daily activities difficult or impossible. These symptoms may occur for no reason, and they can affect your work, school, or social life. Medicines, counseling, and self-care can all help. Follow-up care is a key part of your treatment and safety. Be sure to make and go to all appointments, and call your doctor if you are having problems. It's also a good idea to know your test results and keep a list of the medicines you take. How can you care for yourself at home? · Take medicines exactly as directed. Call your doctor if you think you are having a problem with your medicine. · Go to your counseling sessions and follow-up appointments. · Recognize and accept your anxiety. Then, when you are in a situation that makes you anxious, say to yourself, \"This is not an emergency.  I feel uncomfortable, but I am not in danger. I can keep going even if I feel anxious. \"  · Be kind to your body:  ? Relieve tension with exercise or a massage. ? Get enough rest.  ? Avoid alcohol, caffeine, nicotine, and illegal drugs. They can increase your anxiety level and cause sleep problems. ? Learn and do relaxation techniques. See below for more about these techniques. · Engage your mind. Get out and do something you enjoy. Go to a funny movie, or take a walk or hike. Plan your day. Having too much or too little to do can make you anxious. · Keep a record of your symptoms. Discuss your fears with a good friend or family member, or join a support group for people with similar problems. Talking to others sometimes relieves stress. · Get involved in social groups, or volunteer to help others. Being alone sometimes makes things seem worse than they are. · Get at least 30 minutes of exercise on most days of the week to relieve stress. Walking is a good choice. You also may want to do other activities, such as running, swimming, cycling, or playing tennis or team sports. Relaxation techniques  Do relaxation exercises 10 to 20 minutes a day. You can play soothing, relaxing music while you do them, if you wish. · Tell others in your house that you are going to do your relaxation exercises. Ask them not to disturb you. · Find a comfortable place, away from all distractions and noise. · Lie down on your back, or sit with your back straight. · Focus on your breathing. Make it slow and steady. · Breathe in through your nose. Breathe out through either your nose or mouth. · Breathe deeply, filling up the area between your navel and your rib cage. Breathe so that your belly goes up and down. · Do not hold your breath. · Breathe like this for 5 to 10 minutes. Notice the feeling of calmness throughout your whole body.   As you continue to breathe slowly and deeply, relax by doing the following for another 5 to 10 minutes:  · Tighten and relax each muscle group in your body. You can begin at your toes and work your way up to your head. · Imagine your muscle groups relaxing and becoming heavy. · Empty your mind of all thoughts. · Let yourself relax more and more deeply. · Become aware of the state of calmness that surrounds you. · When your relaxation time is over, you can bring yourself back to alertness by moving your fingers and toes and then your hands and feet and then stretching and moving your entire body. Sometimes people fall asleep during relaxation, but they usually wake up shortly afterward. · Always give yourself time to return to full alertness before you drive a car or do anything that might cause an accident if you are not fully alert. Never play a relaxation tape while you drive a car. When should you call for help? Call 911 anytime you think you may need emergency care. For example, call if:    · You feel you cannot stop from hurting yourself or someone else. Keep the numbers for these national suicide hotlines: 1-840-574-TALK (8-943-652-927.640.9332) and 7-674-WTNNXHB (8-923.214.7749). If you or someone you know talks about suicide or feeling hopeless, get help right away. Watch closely for changes in your health, and be sure to contact your doctor if:    · You have anxiety or fear that affects your life.     · You have symptoms of anxiety that are new or different from those you had before. Where can you learn more? Go to https://[employer] Science.[employer]. org and sign in to your [employer] account. Enter P754 in the [facility] box to learn more about \"Anxiety Disorder: Care Instructions. \"     If you do not have an account, please click on the \"Sign Up Now\" link. Current as of: September 23, 2020               Content Version: 12.9  © 6049-4648 Healthwise, Incorporated. Care instructions adapted under license by [facility] ([facility]).  If you have questions about a medical condition or this instruction, always ask your healthcare professional. [address] any warranty or liability for your use of this information.

## 2022-05-02 NOTE — PATIENT INSTRUCTIONS
Patient Education      Go to the ER ASAP if you develop any chest pain or shortness of breath! Anxiety Disorder: Care Instructions  Your Care Instructions     Anxiety is a normal reaction to stress. Difficult situations can cause you to have symptoms such as sweaty palms and a nervous feeling. In an anxiety disorder, the symptoms are far more severe. Constant worry, muscle tension, trouble sleeping, nausea and diarrhea, and other symptoms can make normal daily activities difficult or impossible. These symptoms may occur for no reason, and they can affect your work, school, or social life. Medicines, counseling, and self-care can all help. Follow-up care is a key part of your treatment and safety. Be sure to make and go to all appointments, and call your doctor if you are having problems. It's also a good idea to know your test results and keep a list of the medicines you take. How can you care for yourself at home? · Take medicines exactly as directed. Call your doctor if you think you are having a problem with your medicine. · Go to your counseling sessions and follow-up appointments. · Recognize and accept your anxiety. Then, when you are in a situation that makes you anxious, say to yourself, \"This is not an emergency. I feel uncomfortable, but I am not in danger. I can keep going even if I feel anxious. \"  · Be kind to your body:  ? Relieve tension with exercise or a massage. ? Get enough rest.  ? Avoid alcohol, caffeine, nicotine, and illegal drugs. They can increase your anxiety level and cause sleep problems. ? Learn and do relaxation techniques. See below for more about these techniques. · Engage your mind. Get out and do something you enjoy. Go to a funny movie, or take a walk or hike. Plan your day. Having too much or too little to do can make you anxious. · Keep a record of your symptoms.  Discuss your fears with a good friend or family member, or join a support group for people with similar problems. Talking to others sometimes relieves stress. · Get involved in social groups, or volunteer to help others. Being alone sometimes makes things seem worse than they are. · Get at least 30 minutes of exercise on most days of the week to relieve stress. Walking is a good choice. You also may want to do other activities, such as running, swimming, cycling, or playing tennis or team sports. Relaxation techniques  Do relaxation exercises 10 to 20 minutes a day. You can play soothing, relaxing music while you do them, if you wish. · Tell others in your house that you are going to do your relaxation exercises. Ask them not to disturb you. · Find a comfortable place, away from all distractions and noise. · Lie down on your back, or sit with your back straight. · Focus on your breathing. Make it slow and steady. · Breathe in through your nose. Breathe out through either your nose or mouth. · Breathe deeply, filling up the area between your navel and your rib cage. Breathe so that your belly goes up and down. · Do not hold your breath. · Breathe like this for 5 to 10 minutes. Notice the feeling of calmness throughout your whole body. As you continue to breathe slowly and deeply, relax by doing the following for another 5 to 10 minutes:  · Tighten and relax each muscle group in your body. You can begin at your toes and work your way up to your head. · Imagine your muscle groups relaxing and becoming heavy. · Empty your mind of all thoughts. · Let yourself relax more and more deeply. · Become aware of the state of calmness that surrounds you. · When your relaxation time is over, you can bring yourself back to alertness by moving your fingers and toes and then your hands and feet and then stretching and moving your entire body. Sometimes people fall asleep during relaxation, but they usually wake up shortly afterward.   · Always give yourself time to return to full alertness before you drive a car or do anything that might cause an accident if you are not fully alert. Never play a relaxation tape while you drive a car. When should you call for help? Call 911 anytime you think you may need emergency care. For example, call if:    · You feel you cannot stop from hurting yourself or someone else. Keep the numbers for these national suicide hotlines: 1-193-136-TALK (0-793.769.5395) and 8-064-JUDCLZV (9-155.966.6214). If you or someone you know talks about suicide or feeling hopeless, get help right away. Watch closely for changes in your health, and be sure to contact your doctor if:    · You have anxiety or fear that affects your life.     · You have symptoms of anxiety that are new or different from those you had before. Where can you learn more? Go to https://[email or web address].[employer]. org and sign in to your [email or web address] account. Enter P754 in the [employer] box to learn more about \"Anxiety Disorder: Care Instructions. \"     If you do not have an account, please click on the \"Sign Up Now\" link. Current as of: September 23, 2020               Content Version: 12.9  © 2006-2021 Healthwise, Incorporated. Care instructions adapted under license by [facility] ([facility]). If you have questions about a medical condition or this instruction, always ask your healthcare professional. [place] [address] any warranty or liability for your use of this information.

## 2022-05-19 ENCOUNTER — TELEMEDICINE (OUTPATIENT)
Dept: PRIMARY CARE CLINIC | Age: 42
End: 2022-05-19
Payer: COMMERCIAL

## 2022-05-19 ENCOUNTER — PATIENT MESSAGE (OUTPATIENT)
Dept: PRIMARY CARE CLINIC | Age: 42
End: 2022-05-19

## 2022-05-19 DIAGNOSIS — L73.2 HIDRADENITIS SUPPURATIVA: Primary | ICD-10-CM

## 2022-05-19 DIAGNOSIS — R11.2 NON-INTRACTABLE VOMITING WITH NAUSEA, UNSPECIFIED VOMITING TYPE: ICD-10-CM

## 2022-05-19 PROCEDURE — G8417 CALC BMI ABV UP PARAM F/U: HCPCS | Performed by: FAMILY MEDICINE

## 2022-05-19 PROCEDURE — 1036F TOBACCO NON-USER: CPT | Performed by: FAMILY MEDICINE

## 2022-05-19 PROCEDURE — G8427 DOCREV CUR MEDS BY ELIG CLIN: HCPCS | Performed by: FAMILY MEDICINE

## 2022-05-19 PROCEDURE — 99213 OFFICE O/P EST LOW 20 MIN: CPT | Performed by: FAMILY MEDICINE

## 2022-05-19 ASSESSMENT — ENCOUNTER SYMPTOMS
ABDOMINAL PAIN: 0
SORE THROAT: 0
BLOOD IN STOOL: 0
CONSTIPATION: 0
VOMITING: 1
DIARRHEA: 1
EYE DISCHARGE: 0
NAUSEA: 1
SHORTNESS OF BREATH: 0
EYE REDNESS: 0
COUGH: 1

## 2022-05-19 NOTE — TELEPHONE ENCOUNTER
From: Magy Wise  To: Dr. Teo Ohara: 5/19/2022 9:58 AM EDT  Subject: Vomiting     Hello, night before last I went to [facility] for vomiting. I still can't hold water or crackers down and have started having diarrhea too. Should I go back or schedule an appointment with you?

## 2022-05-19 NOTE — PROGRESS NOTES
2022    TELEHEALTH EVALUATION -- Audio/Visual (During TZSLH-09 public health emergency)    HPI:    Rolando Lance (:  1980) has requested an audio/video evaluation for the following concern(s):    Patient went to the ER yesterday secondary to a flareup of her Hidradenitis suppurativa with symptoms of N/V and was treated with IV fluids and zofran with improvement. Pt had a Negative COVID test 2 days ago and in the ER was Dx with a stomach virus. Pt denies any sick contacts    Pt has a hx of psoriatic arthritis and is followed by Rheumatology and is on IV inflectra medication and had recent CBC and CMP bloodwork done 2022 which was WNL except for a mildly elevated WBC 11.7 and platelets 265  Pt is due for her next infusion tomorrow     Pt has a hx of Hidradenitis suppurativa since she was a teenager and is followed by [facility] [place] and has had multiple surgeries (most recently 2020) along with an episode sepsis . 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 Pt is with good control on IV inflectra medication and does take bursts of prednisone as needed recent flareups and also gets kenalog injections into lesions by Dermatology      Patient has a past medical history significant for HTN and is on norvasc 5 mg daily     Pt is on miralax and protonix for irritable bowel and is followed by GI      Pt is on lexapro and buspar for anxiety/depression with good control. Pt is followed by Weight Management and is on ozempic medication     Pt has a hx of PCOS and endometriosis and is followed by OB/Gyne and is on mirena medication      Pt is a nonsmoker and denies alcohol use; Pt has a certified medical marijuana card     FHx positive for early CAD (Father and PUncle)     Pt did complete her COVID vaccine series    ER note 2022  39year-old female presents with nausea and vomiting for the past 1 day.  On arrival on on examination patient is in no acute distress and nontoxic. Patient has normal vital signs during my evaluation. Afebrile. No abdominal tenderness during entire palpation and examination. No history of pancreatitis, recent ibuprofen or alcohol use. History of cholecystectomy in the past. Denies dysuria or hematuria. Laboratory work including CBC, BMP, [place] panel, lipase, urinalysis shows no acute abnormality. Chest x-ray shows no sign of pneumonia, pleural effusion or pneumothorax, EKG shows no acute ischemic changes with a troponin less than 3. Following a liter of fluid, Zofran and GI cocktail patient states her symptoms have significantly improved, she is not had any further vomiting episodes throughout her entire ED course. On repeat abdominal examination she continues to have no abdominal pain. I did discuss without abdominal pain at this point in time, do not feel as though beneficial to obtain a CT abdomen pelvis, with normal laboratory work, she feels comfortable and agreeable to this. Did discuss if she has development of abdominal pain, fever, inability to tolerate oral intake, and this to be reasons to return, she feels comfortable this plan otherwise to follow-up with primary care provider in the next 2 to 3 days. Patient be discharged home with Zofran and medication, as well as referral return precautions able to verbalize understanding plan as stated above. Discharged home in stable condition at this time. Review of Systems   Constitutional: Positive for chills (initially), fatigue and fever (subjective 2 days ago). HENT: Negative for nosebleeds and sore throat. Eyes: Negative for discharge and redness. Respiratory: Positive for cough (dry). Negative for shortness of breath. Cardiovascular: Positive for leg swelling (chronic and pt reports normal LE venous dopplers 2 years ago). Negative for chest pain and palpitations. Gastrointestinal: Positive for diarrhea, nausea and vomiting.  Negative for abdominal pain, blood in stool and constipation. Negative melena or indigestion   Genitourinary: Negative for dysuria and hematuria. Musculoskeletal: Positive for arthralgias. Skin: Positive for rash (flareup of her hidradenitis suppurativa in her L axilla and L breast). Neurological: Positive for headaches. Negative for dizziness, seizures and syncope. Psychiatric/Behavioral: Negative for dysphoric mood. The patient is not nervous/anxious. Prior to Visit Medications    Medication Sig Taking? Authorizing Provider   lidocaine-prilocaine (EMLA) 2.5-2.5 % cream  Yes Historical Provider, MD   busPIRone (BUSPAR) 10 MG tablet TAKE ONE TABLET BY MOUTH TWICE A DAY Yes Brad Brunner MD   amLODIPine (NORVASC) 5 MG tablet TAKE ONE TABLET BY MOUTH DAILY Yes Flora Alvarado MD   escitalopram (LEXAPRO) 20 MG tablet TAKE ONE TABLET BY MOUTH DAILY Yes Flora Alvarado MD   fluticasone (FLONASE) 50 MCG/ACT nasal spray 1 spray by Each Nostril route daily Yes Ana Dawson,    blood glucose test strips (ASCENSIA AUTODISC VI;ONE TOUCH ULTRA TEST VI) strip 1 each by In Vitro route daily As needed.  Yes Flora Alvarado MD   glucose monitoring (FREESTYLE FREEDOM) kit 1 kit by Does not apply route daily Yes Flora Alvarado MD   spironolactone (ALDACTONE) 100 MG tablet Take 100 mg by mouth Yes Historical Provider, MD   ondansetron (ZOFRAN ODT) 4 MG disintegrating tablet Take 1 tablet by mouth every 8 hours as needed for Nausea Yes Juanita Carty MD   benzoyl peroxide 5 % external liquid  Yes Historical Provider, MD   clindamycin (CLEOCIN T) 1 % external solution  Yes Historical Provider, MD   Lancets (Meri Aguilar) MISC Use to test blood glucose once daily Yes Historical Provider, MD   metroNIDAZOLE (METROCREAM) 0.75 % cream APPLY TO RASH ON FACE TWO TIMES A DAY Yes Historical Provider, MD   mupirocin (BACTROBAN) 2 % ointment APPLY TO OPEN SORES TWO TIMES A DAY Yes Historical Provider, MD   nystatin-triamcinolone (MYCOLOG II) 038805-3.1 UNIT/GM-% cream APPLY TOPICALLY THREE TIMES A DAY Yes Historical Provider, MD   pantoprazole (PROTONIX) 40 MG tablet TAKE ONE TABLET BY MOUTH TWICE A DAY BEFORE MEALS Yes Historical Provider, MD   polyethylene glycol (GLYCOLAX) 17 GM/SCOOP powder Take 17 g by mouth daily Yes Historical Provider, MD   Semaglutide, 1 MG/DOSE, (OZEMPIC, 1 MG/DOSE,) 2 MG/1.5ML SOPN Inject 1 mg into the skin every 7 days Yes Historical Provider, MD   predniSONE (DELTASONE) 10 MG tablet Take 40 mg by mouth daily as needed Yes Historical Provider, MD   inFLIXimab (INFLECTRA) 100 MG SOLR Infuse intravenously every 28 days Yes Historical Provider, MD   linaclotide (LINZESS) 145 MCG capsule Take 1 capsule by mouth 2 times daily Yes Valerie Talbot MD       Social History     Tobacco Use    Smoking status: Never Smoker    Smokeless tobacco: Never Used   Substance Use Topics    Alcohol use: No    Drug use: No        Allergies   Allergen Reactions    Cefazolin Anaphylaxis    Adhesive Tape      Skin reddens, use paper tape    Cinnamon Rash       PHYSICAL EXAMINATION:  [ INSTRUCTIONS:  \"[x]\" Indicates a positive item     Vital Signs: (As obtained by patient/caregiver or practitioner observation)    Blood pressure-  Heart rate-    Respiratory rate-    Temperature-  Pulse oximetry-     Constitutional: [x] Appears well-developed and well-nourished [] No apparent distress      [] Abnormal-   Mental status  [x] Alert and awake  [] Oriented to person/place/time []Able to follow commands      Eyes:  EOM    []  Normal  [] Abnormal-  Sclera  [x]  Normal  [] Abnormal -         Discharge [x]  None visible  [] Abnormal -    HENT:   [] Normocephalic, atraumatic.   [] Abnormal   [x] Mouth/Throat: Mucous membranes are slightly dry    External Ears [] Normal  [] Abnormal-     Neck: [] No visualized mass     Pulmonary/Chest: [x] Respiratory effort normal.  [] No visualized signs of difficulty breathing or respiratory distress [] Abnormal-      Musculoskeletal:   [] Normal gait with no signs of ataxia         [] Normal range of motion of neck        [] Abnormal-       Neurological:        [x] No Facial Asymmetry (Cranial nerve 7 motor function) (limited exam to video visit)          [] No gaze palsy        [] Abnormal-         Skin:        [] No significant exanthematous lesions or discoloration noted on facial skin         [] Abnormal-            Psychiatric:       [x] Normal Affect [] No Hallucinations        [] Abnormal-     Other pertinent observable physical exam findings-     ASSESSMENT/PLAN:  1. Hidradenitis suppurativa  Pt is scheduled for her next IV inflectra medication infusion tomorrow and was told to see her Dermatologist ASAP for kenalog injections of her new lesions    2. Non-intractable vomiting with nausea, unspecified vomiting type  Apparently is secondary to a viral gastroenteritis and pt was told to increase her zofran dosage to 8 mg TID for better control and was told to go to the ER ASAP if you develop any fever, abdominal pain or blood in stool! Return in about 2 months (around 7/19/2022). Jose Cruz Maxwellbianca, was evaluated through a synchronous (real-time) audio-video encounter. The patient (or guardian if applicable) is aware that this is a billable service, which includes applicable co-pays. This Virtual Visit was conducted with patient's (and/or legal guardian's) consent. The visit was conducted pursuant to the emergency declaration under the [address] [place] [address], [address] [place] [address] authority and the [employer] General Act. Patient identification was verified, and a caregiver was present when appropriate. The patient was located at home in a state where the provider was licensed to provide care.       Total time spent on this encounter: Not billed by time    --Kelsey Jackman MD on 5/24/2022 at 10:52 AM    An electronic signature was used to authenticate this note.

## 2022-05-19 NOTE — TELEPHONE ENCOUNTER
Called pt, vomiting not any better since yesterday in ED and Zofran not helping.   VV added to Dr. Annette Donald at 12pm.

## 2022-06-03 LAB — DIABETIC RETINOPATHY: NEGATIVE

## 2022-06-27 RX ORDER — ESCITALOPRAM OXALATE 20 MG/1
TABLET ORAL
Qty: 30 TABLET | Refills: 1 | Status: SHIPPED | OUTPATIENT
Start: 2022-06-27 | End: 2022-09-11 | Stop reason: SDUPTHER

## 2022-06-27 NOTE — TELEPHONE ENCOUNTER
Medication:   Requested Prescriptions     Pending Prescriptions Disp Refills    escitalopram (LEXAPRO) 20 MG tablet [Pharmacy Med Name: ESCITALOPRAM 20 MG TABLET] 30 tablet 1     Sig: TAKE ONE TABLET BY MOUTH DAILY        Last Filled:  04/20/2022    Patient Phone Number: [phone] (home)     Last appt: 5/19/2022   Next appt: 9/8/2022    Last OARRS: No flowsheet data found.

## 2022-07-05 ENCOUNTER — TELEPHONE (OUTPATIENT)
Dept: ENT CLINIC | Age: 42
End: 2022-07-05

## 2022-07-05 NOTE — TELEPHONE ENCOUNTER
LVM for patient to call the office to schedule an appt sooner than later for her tube falling out of the ear

## 2022-07-11 ENCOUNTER — OFFICE VISIT (OUTPATIENT)
Dept: ENT CLINIC | Age: 42
End: 2022-07-11
Payer: COMMERCIAL

## 2022-07-11 VITALS — TEMPERATURE: 96.9 F | DIASTOLIC BLOOD PRESSURE: 83 MMHG | SYSTOLIC BLOOD PRESSURE: 139 MMHG | HEART RATE: 87 BPM

## 2022-07-11 DIAGNOSIS — H90.3 SENSORINEURAL HEARING LOSS (SNHL) OF BOTH EARS: ICD-10-CM

## 2022-07-11 DIAGNOSIS — E04.1 THYROID NODULE: Primary | ICD-10-CM

## 2022-07-11 DIAGNOSIS — H93.13 TINNITUS OF BOTH EARS: ICD-10-CM

## 2022-07-11 DIAGNOSIS — Z80.8 FAMILY HISTORY OF THYROID CANCER: ICD-10-CM

## 2022-07-11 DIAGNOSIS — Z96.22 HISTORY OF PLACEMENT OF EAR TUBES: ICD-10-CM

## 2022-07-11 DIAGNOSIS — H72.93 BILATERAL TYMPANIC MEMBRANE PERFORATION: ICD-10-CM

## 2022-07-11 PROCEDURE — G8417 CALC BMI ABV UP PARAM F/U: HCPCS | Performed by: STUDENT IN AN ORGANIZED HEALTH CARE EDUCATION/TRAINING PROGRAM

## 2022-07-11 PROCEDURE — G8427 DOCREV CUR MEDS BY ELIG CLIN: HCPCS | Performed by: STUDENT IN AN ORGANIZED HEALTH CARE EDUCATION/TRAINING PROGRAM

## 2022-07-11 PROCEDURE — 99213 OFFICE O/P EST LOW 20 MIN: CPT | Performed by: STUDENT IN AN ORGANIZED HEALTH CARE EDUCATION/TRAINING PROGRAM

## 2022-07-11 PROCEDURE — 1036F TOBACCO NON-USER: CPT | Performed by: STUDENT IN AN ORGANIZED HEALTH CARE EDUCATION/TRAINING PROGRAM

## 2022-07-11 RX ORDER — CETIRIZINE HYDROCHLORIDE 10 MG/1
10 TABLET ORAL DAILY
COMMUNITY

## 2022-07-11 NOTE — PROGRESS NOTES
Allergies   Allergen Reactions    Cefazolin Anaphylaxis    Adhesive Tape      Skin reddens, use paper tape    Cinnamon Rash       Medications     Current Outpatient Medications   Medication Sig Dispense Refill    cetirizine (ZYRTEC) 10 MG tablet Take 10 mg by mouth daily      escitalopram (LEXAPRO) 20 MG tablet TAKE ONE TABLET BY MOUTH DAILY 30 tablet 1    lidocaine-prilocaine (EMLA) 2.5-2.5 % cream       busPIRone (BUSPAR) 10 MG tablet TAKE ONE TABLET BY MOUTH TWICE A DAY 60 tablet 2    amLODIPine (NORVASC) 5 MG tablet TAKE ONE TABLET BY MOUTH DAILY 30 tablet 1    fluticasone (FLONASE) 50 MCG/ACT nasal spray 1 spray by Each Nostril route daily 32 g 1    blood glucose test strips (ASCENSIA AUTODISC VI;ONE TOUCH ULTRA TEST VI) strip 1 each by In Vitro route daily As needed.  100 each 3    glucose monitoring (FREESTYLE FREEDOM) kit 1 kit by Does not apply route daily 1 kit 0    spironolactone (ALDACTONE) 100 MG tablet Take 100 mg by mouth      benzoyl peroxide 5 % external liquid       clindamycin (CLEOCIN T) 1 % external solution       Lancets (ONETOUCH DELICA PLUS [id number]) MISC Use to test blood glucose once daily      metroNIDAZOLE (METROCREAM) 0.75 % cream APPLY TO RASH ON FACE TWO TIMES A DAY      mupirocin (BACTROBAN) 2 % ointment APPLY TO OPEN SORES TWO TIMES A DAY      nystatin-triamcinolone (MYCOLOG II) 646418-5.1 UNIT/GM-% cream APPLY TOPICALLY THREE TIMES A DAY      pantoprazole (PROTONIX) 40 MG tablet TAKE ONE TABLET BY MOUTH TWICE A DAY BEFORE MEALS      polyethylene glycol (GLYCOLAX) 17 GM/SCOOP powder Take 17 g by mouth daily      Semaglutide, 1 MG/DOSE, (OZEMPIC, 1 MG/DOSE,) 2 MG/1.5ML SOPN Inject 1 mg into the skin every 7 days      predniSONE (DELTASONE) 10 MG tablet Take 40 mg by mouth daily as needed      inFLIXimab (INFLECTRA) 100 MG SOLR Infuse intravenously every 28 days      linaclotide (LINZESS) 145 MCG capsule Take 1 capsule by mouth 2 times daily 60 capsule 1    ondansetron (ZOFRAN ODT) 4 MG disintegrating tablet Take 1 tablet by mouth every 8 hours as needed for Nausea 20 tablet 0     No current facility-administered medications for this visit. Review of Systems     REVIEW OF SYSTEM: See HPI above    PhysicalExam     Vitals:    07/11/22 1626   BP: 139/83   Site: Left Upper Arm   Position: Sitting   Cuff Size: Medium Adult   Pulse: 87   Temp: 96.9 °F (36.1 °C)   TempSrc: Temporal       PHYSICAL EXAM  /83 (Site: Left Upper Arm, Position: Sitting, Cuff Size: Medium Adult)   Pulse 87   Temp 96.9 °F (36.1 °C) (Temporal)     GENERAL: No acute distress, alert and oriented. EYES: EOMI, Anti-icteric. NOSE: On anterior rhinoscopy there is no epistaxis, nasal mucosa moist and normal appearing, no purulent drainage. EARS: Normal external appearance; on portable otomicroscopy:     -Ad: External auditory canal with cerumen impaction which was removed with #7 Houser suction under otomicroscopy. After removal there was noted to be a pinpoint perforation of the tympanic membrane along the posterior annulus with clear margins, no otorrhea. No evidence of  cholesteatoma.     -As: External auditory canal without stenosis, tympanic membrane with approximately 10% central posterior pars tensa perforation with clear margins. No otorrhea.   FACE: HB 1/6 bilaterally, symmetric appearing, sensation equal bilaterally  ORAL CAVITY: No masses or lesions visualized or palpated, uvula is midline, moist mucous membranes, no oropharyngeal masses or oropharyngeal obstruction  NECK: Normal range of motion, no thyromegaly, trachea is midline, no palpable lymphadenopathy or neck masses, no crepitus  CHEST: Normal respiratory effort, breathing comfortably, no retractions  SKIN: No rashes, normal appearing skin, no evidence of skin lesions/tumors  NEURO: Cranial Nerves 2, 3, 4, 5, 6, 7, 11, 12 grossly intact bilaterally     I have performed a head and neck physical exam personally or was physically present during the key or critical portions of the service. Data/Imaging Review     Narrative   Thyroid ultrasound. (10/7/21)       HISTORY: Family history of thyroid cancer.       The right lobe measures 4.4 x 1.2 x 1.9 cm       The left lobe measures 3.8 x 1.1 x 1.4 cm       The isthmus measures 3 mm.       Right lobe nodules: Oval solid nodule moderately hypoechoic inferior pole 9 x 9 x 6 mm indeterminate. Biopsy recommended.       Left lobe nodules: None.           Impression       9 mm solid nodule moderately hypoechoic inferior pole on the right indeterminate. Biopsy recommended as neoplasm is not excluded       Assessment and Plan     1. Thyroid nodule  2. Family history of thyroid cancer  -We will repeat thyroid ultrasound for surveillance  - US THYROID; Future    3. Tinnitus of both ears  4. Sensorineural hearing loss (SNHL) of both ears  5. Bilateral tympanic membrane perforation  6. History of placement of ear tubes  -She will follow-up in 2 months to review her thyroid ultrasound results and we will obtain comprehensive audiologic evaluation at that time. This will give her left tympanic membrane time to heal from recent tympanostomy tube extrusion. Currently with bilateral tympanic membrane perforations which may be adventitious to the patient given her history of needing multiple ear tubes in the past and likely underlying eustachian tube dysfunction. We will make more recommendations after hearing eval.      Follow-Up     Return in about 2 months (around 9/11/2022) for after imaging, audiogram prior to appointment. Trisha Wright   Department of Otolaryngology/Head and Neck Surgery  7/11/22    Medical Decision Making:   The following items were considered in medical decision making:  Independent review of images  Review / order clinical lab tests  Review / order radiology tests  Decision to obtain old records      This note was generated completely or in part utilizing Dragon dictation speech recognition software. Occasionally, words are mistranscribed and despite editing, the text may contain inaccuracies due to incorrect word recognition. If further clarification is needed please contact the office at [phone].

## 2022-07-15 ENCOUNTER — HOSPITAL ENCOUNTER (OUTPATIENT)
Dept: ULTRASOUND IMAGING | Age: 42
Discharge: HOME | End: 2022-07-15
Payer: COMMERCIAL

## 2022-07-15 DIAGNOSIS — E04.1 THYROID NODULE: ICD-10-CM

## 2022-07-15 DIAGNOSIS — Z80.8 FAMILY HISTORY OF THYROID CANCER: ICD-10-CM

## 2022-07-15 PROCEDURE — 76536 US EXAM OF HEAD AND NECK: CPT

## 2022-08-04 DIAGNOSIS — I10 ESSENTIAL HYPERTENSION: ICD-10-CM

## 2022-08-04 RX ORDER — AMLODIPINE BESYLATE 5 MG/1
TABLET ORAL
Qty: 30 TABLET | Refills: 2 | Status: SHIPPED | OUTPATIENT
Start: 2022-08-04

## 2022-08-04 NOTE — TELEPHONE ENCOUNTER
Medication:   Requested Prescriptions     Pending Prescriptions Disp Refills    amLODIPine (NORVASC) 5 MG tablet [Pharmacy Med Name: amLODIPine BESYLATE 5 MG TAB] 30 tablet 1     Sig: TAKE ONE TABLET BY MOUTH DAILY        Last Filled:  04/27/2022    Patient Phone Number: [phone] (home)     Last appt: 5/19/2022   Next appt: 9/8/2022    Last OARRS: No flowsheet data found.

## 2022-08-28 ENCOUNTER — PATIENT MESSAGE (OUTPATIENT)
Dept: PRIMARY CARE CLINIC | Age: 42
End: 2022-08-28

## 2022-08-29 NOTE — TELEPHONE ENCOUNTER
From: Tashi Weathers  To: Dr. Alexei Condon: 8/28/2022 10:32 PM EDT  Subject: Upcoming appointment     I have an appointment coming up to discuss the increase of buspirone. I have noticed it is helping but not exactly to where I would like. Could we increase it more or add something for harder days as needed?   Thanks

## 2022-09-07 DIAGNOSIS — F41.9 ANXIETY AND DEPRESSION: ICD-10-CM

## 2022-09-07 DIAGNOSIS — F32.A ANXIETY AND DEPRESSION: ICD-10-CM

## 2022-09-08 ENCOUNTER — OFFICE VISIT (OUTPATIENT)
Dept: PRIMARY CARE CLINIC | Age: 42
End: 2022-09-08
Payer: COMMERCIAL

## 2022-09-08 VITALS
HEIGHT: 64 IN | OXYGEN SATURATION: 97 % | HEART RATE: 87 BPM | RESPIRATION RATE: 16 BRPM | BODY MASS INDEX: 47.63 KG/M2 | DIASTOLIC BLOOD PRESSURE: 84 MMHG | WEIGHT: 279 LBS | TEMPERATURE: 97.7 F | SYSTOLIC BLOOD PRESSURE: 126 MMHG

## 2022-09-08 DIAGNOSIS — E66.01 MORBID OBESITY (HCC): ICD-10-CM

## 2022-09-08 DIAGNOSIS — L73.2 HIDRADENITIS SUPPURATIVA: ICD-10-CM

## 2022-09-08 DIAGNOSIS — F32.A ANXIETY AND DEPRESSION: ICD-10-CM

## 2022-09-08 DIAGNOSIS — K58.1 IRRITABLE BOWEL SYNDROME WITH CONSTIPATION: ICD-10-CM

## 2022-09-08 DIAGNOSIS — F41.9 ANXIETY AND DEPRESSION: ICD-10-CM

## 2022-09-08 DIAGNOSIS — I10 ESSENTIAL HYPERTENSION: Primary | ICD-10-CM

## 2022-09-08 DIAGNOSIS — R60.0 BILATERAL LEG EDEMA: ICD-10-CM

## 2022-09-08 PROCEDURE — 1036F TOBACCO NON-USER: CPT | Performed by: FAMILY MEDICINE

## 2022-09-08 PROCEDURE — G8417 CALC BMI ABV UP PARAM F/U: HCPCS | Performed by: FAMILY MEDICINE

## 2022-09-08 PROCEDURE — G8427 DOCREV CUR MEDS BY ELIG CLIN: HCPCS | Performed by: FAMILY MEDICINE

## 2022-09-08 PROCEDURE — 99214 OFFICE O/P EST MOD 30 MIN: CPT | Performed by: FAMILY MEDICINE

## 2022-09-08 RX ORDER — BUSPIRONE HYDROCHLORIDE 15 MG/1
15 TABLET ORAL 2 TIMES DAILY
Qty: 60 TABLET | Refills: 2 | Status: SHIPPED | OUTPATIENT
Start: 2022-09-08

## 2022-09-08 RX ORDER — BUSPIRONE HYDROCHLORIDE 10 MG/1
TABLET ORAL
Qty: 60 TABLET | Refills: 2 | Status: SHIPPED
Start: 2022-09-08 | End: 2022-09-08 | Stop reason: DRUGHIGH

## 2022-09-08 ASSESSMENT — ANXIETY QUESTIONNAIRES
6. BECOMING EASILY ANNOYED OR IRRITABLE: 1
3. WORRYING TOO MUCH ABOUT DIFFERENT THINGS: 2
IF YOU CHECKED OFF ANY PROBLEMS ON THIS QUESTIONNAIRE, HOW DIFFICULT HAVE THESE PROBLEMS MADE IT FOR YOU TO DO YOUR WORK, TAKE CARE OF THINGS AT HOME, OR GET ALONG WITH OTHER PEOPLE: SOMEWHAT DIFFICULT
7. FEELING AFRAID AS IF SOMETHING AWFUL MIGHT HAPPEN: 2
GAD7 TOTAL SCORE: 13
1. FEELING NERVOUS, ANXIOUS, OR ON EDGE: 3
2. NOT BEING ABLE TO STOP OR CONTROL WORRYING: 2
4. TROUBLE RELAXING: 2
5. BEING SO RESTLESS THAT IT IS HARD TO SIT STILL: 1

## 2022-09-08 ASSESSMENT — ENCOUNTER SYMPTOMS
ABDOMINAL PAIN: 1
NAUSEA: 0
EYE ITCHING: 1
DIARRHEA: 0
BLOOD IN STOOL: 0
COUGH: 0
VOMITING: 0
SHORTNESS OF BREATH: 0
SORE THROAT: 0

## 2022-09-08 NOTE — TELEPHONE ENCOUNTER
Medication:   Requested Prescriptions     Pending Prescriptions Disp Refills    busPIRone (BUSPAR) 10 MG tablet [Pharmacy Med Name: busPIRone HCL 10 MG TABLET] 60 tablet 2     Sig: TAKE ONE TABLET BY MOUTH TWICE A DAY        Last Filled:  5/2/2022, #60 with 2 refills    Patient Phone Number: [phone] (home)     Last appt: 5/19/2022   Next appt: 9/8/2022    Last OARRS: No flowsheet data found.

## 2022-09-08 NOTE — PROGRESS NOTES
Chief Complaint   Patient presents with    Anxiety     Pt here for follow up visit - pt sts \"doing better - still having anxiety\"         Massiel Chavez is a 39 y.o. female who presents for routine visit regarding HTN and anxiety/depression. Pt did get cystoscopy done which was normal     Pt has a hx of psoriatic arthritis and is followed by Rheumatology and is on IV inflectra medication       Pt has a hx of Hidradenitis suppurativa since she was a teenager and is followed by [facility] [place] and has had multiple surgeries (most recently 01/2020) along with an episode sepsis 2018. 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 Pt is with good control on IV inflectra medication and does take bursts of prednisone as needed recent flareups and also gets kenalog injections into lesions by Dermatology --- Pt did recently complete a course of antibiotics for a L breast cellulitis      Patient has a past medical history significant for HTN and is on norvasc 5 mg daily     Pt is on miralax and protonix for irritable bowel and is followed by GI      Pt is on lexapro and buspar dosage was recently increased with 50% improvement of her anxiety/depression. Pt is followed by Weight Management and is on ozempic medication     Pt has a hx of PCOS and endometriosis and is followed by OB/Gyne and is on mirena medication      Pt is a nonsmoker and denies alcohol use; Pt has a certified medical marijuana card     FHx positive for early CAD (Father and PUncle)     Pt did complete her COVID vaccine series      Review of Systems   Constitutional:  Positive for chills (at times with flareups of her HS) and fatigue. Negative for fever. HENT:  Negative for nosebleeds and sore throat. Eyes:  Positive for itching (L chronic and uses OTC allergy drops with some relief). Negative blurred vision or diplopia   Respiratory:  Negative for cough and shortness of breath.     Cardiovascular: Positive for leg swelling (chronic and pt reports normal LE venous dopplers 2 years ago). Negative for chest pain and palpitations. Gastrointestinal:  Positive for abdominal pain (crampy at times (chronic)). Negative for blood in stool, diarrhea, nausea and vomiting. Negative melena or indigestion   Endocrine: Negative for polyuria. Genitourinary:  Positive for hematuria (at times). Negative for dysuria. Musculoskeletal:  Positive for arthralgias. Skin:  Positive for rash (psoriatic at times and recent flareup of her hidradenitis suppurativa in her L axilla and L chest). Neurological:  Positive for headaches. Negative for dizziness, seizures, syncope, speech difficulty and weakness. Psychiatric/Behavioral:  Positive for dysphoric mood (at times). The patient is nervous/anxious. Vitals:    09/08/22 1253   BP: 126/84   Pulse: 87   Resp: 16   Temp: 97.7 °F (36.5 °C)   SpO2: 97%         Physical Exam  Vitals reviewed. Constitutional:       General: She is not in acute distress. Appearance: She is obese. HENT:      Mouth/Throat:      Mouth: Mucous membranes are moist.      Pharynx: Oropharynx is clear. Eyes:      General: No scleral icterus. Comments: Pink conjunctivae; No scleral redness or discharge noted from L eye   Neck:      Thyroid: No thyromegaly. Cardiovascular:      Heart sounds: Normal heart sounds. No murmur heard. No friction rub. No gallop. Pulmonary:      Effort: Pulmonary effort is normal.      Breath sounds: Normal breath sounds. Abdominal:      Palpations: Abdomen is soft. Tenderness: There is no abdominal tenderness. There is no guarding or rebound. Musculoskeletal:      Comments: Positive 2 -3+ leg edema but no calf tendernous to palpation noted B/L   Lymphadenopathy:      Cervical: No cervical adenopathy. Skin:     Findings: No rash. Neurological:      Mental Status: She is alert.       Comments: Cranial nerves 2 - 12 grossly intact; Muscle strength 5/5 throughout   Psychiatric:         Mood and Affect: Mood normal.       ASSESSMENT AND PLAN    1. Anxiety and depression  Patient was told to continue with her lexapro medication and will increase her buspar dosage for better control and reevaluate pt in 3 months  - busPIRone (BUSPAR) 15 MG tablet; Take 15 mg by mouth in the morning and 15 mg in the evening. Dispense: 60 tablet; Refill: 2    2. Hidradenitis suppurativa  Patient is being managed by Dermatology    3. Irritable bowel syndrome with constipation  Patient clinically stable on present medications and is with a nontender abdomen on PE    4. Essential hypertension  Patient clinically stable on present medications and denies any CP or SOB     5. Morbid obesity ([address]  Patient was advised to perform aerobic exercise and to decrease caloric intake to promote weight loss    6. Bilateral leg edema  Patient was told to follow a low sodium diet and to keep his legs elevated as much as possible    --Patient was told to use OTC pataday allergy drops as directed for her L eye pruritis      Troy Gresham MD      Return in about 3 months (around 12/8/2022).

## 2022-09-09 NOTE — TELEPHONE ENCOUNTER
Medication:   Requested Prescriptions     Pending Prescriptions Disp Refills    escitalopram (LEXAPRO) 20 MG tablet 30 tablet 1        Last Filled:  06/24/2022 #30 with one refill     Patient Phone Number: [phone] (home)     Last appt: 9/8/2022   Next appt: 12/8/2022    Last OARRS: No flowsheet data found.

## 2022-09-11 RX ORDER — ESCITALOPRAM OXALATE 20 MG/1
TABLET ORAL
Qty: 30 TABLET | Refills: 3 | Status: SHIPPED | OUTPATIENT
Start: 2022-09-11

## 2022-10-11 ENCOUNTER — OFFICE VISIT (OUTPATIENT)
Dept: ENT CLINIC | Age: 42
End: 2022-10-11
Payer: COMMERCIAL

## 2022-10-11 ENCOUNTER — OFFICE VISIT (OUTPATIENT)
Dept: AUDIOLOGY | Age: 42
End: 2022-10-11
Payer: COMMERCIAL

## 2022-10-11 VITALS — DIASTOLIC BLOOD PRESSURE: 71 MMHG | SYSTOLIC BLOOD PRESSURE: 141 MMHG | HEART RATE: 90 BPM | TEMPERATURE: 97.3 F

## 2022-10-11 DIAGNOSIS — H93.13 SUBJECTIVE TINNITUS OF BOTH EARS: ICD-10-CM

## 2022-10-11 DIAGNOSIS — Z80.8 FAMILY HISTORY OF THYROID CANCER: ICD-10-CM

## 2022-10-11 DIAGNOSIS — H69.83 DYSFUNCTION OF BOTH EUSTACHIAN TUBES: ICD-10-CM

## 2022-10-11 DIAGNOSIS — H72.92 PERFORATION OF LEFT TYMPANIC MEMBRANE: ICD-10-CM

## 2022-10-11 DIAGNOSIS — H90.72 MIXED CONDUCTIVE AND SENSORINEURAL HEARING LOSS OF LEFT EAR WITH UNRESTRICTED HEARING OF RIGHT EAR: ICD-10-CM

## 2022-10-11 DIAGNOSIS — H90.A12 CONDUCTIVE HEARING LOSS OF LEFT EAR WITH RESTRICTED HEARING OF RIGHT EAR: Primary | ICD-10-CM

## 2022-10-11 DIAGNOSIS — E04.1 THYROID NODULE: Primary | ICD-10-CM

## 2022-10-11 PROCEDURE — 92567 TYMPANOMETRY: CPT | Performed by: AUDIOLOGIST

## 2022-10-11 PROCEDURE — 92557 COMPREHENSIVE HEARING TEST: CPT | Performed by: AUDIOLOGIST

## 2022-10-11 PROCEDURE — 99213 OFFICE O/P EST LOW 20 MIN: CPT | Performed by: STUDENT IN AN ORGANIZED HEALTH CARE EDUCATION/TRAINING PROGRAM

## 2022-10-11 PROCEDURE — G8484 FLU IMMUNIZE NO ADMIN: HCPCS | Performed by: STUDENT IN AN ORGANIZED HEALTH CARE EDUCATION/TRAINING PROGRAM

## 2022-10-11 PROCEDURE — G8427 DOCREV CUR MEDS BY ELIG CLIN: HCPCS | Performed by: STUDENT IN AN ORGANIZED HEALTH CARE EDUCATION/TRAINING PROGRAM

## 2022-10-11 PROCEDURE — 1036F TOBACCO NON-USER: CPT | Performed by: STUDENT IN AN ORGANIZED HEALTH CARE EDUCATION/TRAINING PROGRAM

## 2022-10-11 PROCEDURE — G8417 CALC BMI ABV UP PARAM F/U: HCPCS | Performed by: STUDENT IN AN ORGANIZED HEALTH CARE EDUCATION/TRAINING PROGRAM

## 2022-10-11 NOTE — PROGRESS NOTES
[address] VISIT      Patient Name: Joseph Almaraz  Medical Record Number:  [id number]  Primary Care Physician:  Irina Melgar MD    ChiefComplaint     Chief Complaint   Patient presents with    Follow-up     Still has some ringing in ears. Review hearing eval       History of Present Illness     Joseph Almaraz is an 39 y.o. female previously seen for thyroid nodule, family history of thyroid cancer, bilateral tinnitus and hearing loss, bilateral tympanic membrane perforation, history of placement of ear tubes. Interval History:   No recent hearing changes. No changes in tenderness. No otalgia or otorrhea.     Past Medical History     Past Medical History:   Diagnosis Date    Endometriosis     Hypertension     Lumbar disc disorder     Nausea & vomiting     Polycystic ovarian syndrome     Sleep apnea 1/22/2014       Past Surgical History     Past Surgical History:   Procedure Laterality Date    CARDIAC SURGERY      cardia cath-no blockage    CHOLECYSTECTOMY  2008    EYE SURGERY  2010    detached retina    OTHER SURGICAL HISTORY  01/21/14    L4-5 BILATERAL LAMINECTOMY AND DISCECTOMY    OVARY SURGERY      multiple times    TONSILLECTOMY  1985    TYMPANOSTOMY TUBE PLACEMENT         Family History     Family History   Problem Relation Age of Onset    Kidney Disease Mother         CKD    Hypertension Mother     Diabetes Mother     COPD Father     Asthma Brother     Sleep Apnea Brother     Elevated Lipids Sister         hypertriglyceridemia       Social History     Social History     Tobacco Use    Smoking status: Never    Smokeless tobacco: Never   Substance Use Topics    Alcohol use: No    Drug use: No        Allergies     Allergies   Allergen Reactions    Cefazolin Anaphylaxis    Adhesive Tape      Skin reddens, use paper tape    Cinnamon Rash       Medications     Current Outpatient Medications   Medication Sig Dispense Refill    escitalopram (LEXAPRO) 20 MG tablet TAKE ONE TABLET BY MOUTH DAILY 30 tablet 3    busPIRone (BUSPAR) 15 MG tablet Take 15 mg by mouth in the morning and 15 mg in the evening. 60 tablet 2    amLODIPine (NORVASC) 5 MG tablet TAKE ONE TABLET BY MOUTH DAILY 30 tablet 2    cetirizine (ZYRTEC) 10 MG tablet Take 10 mg by mouth daily      lidocaine-prilocaine (EMLA) 2.5-2.5 % cream       fluticasone (FLONASE) 50 MCG/ACT nasal spray 1 spray by Each Nostril route daily 32 g 1    blood glucose test strips (ASCENSIA AUTODISC VI;ONE TOUCH ULTRA TEST VI) strip 1 each by In Vitro route daily As needed. 100 each 3    glucose monitoring (FREESTYLE FREEDOM) kit 1 kit by Does not apply route daily 1 kit 0    spironolactone (ALDACTONE) 100 MG tablet Take 100 mg by mouth      ondansetron (ZOFRAN ODT) 4 MG disintegrating tablet Take 1 tablet by mouth every 8 hours as needed for Nausea 20 tablet 0    benzoyl peroxide 5 % external liquid       clindamycin (CLEOCIN T) 1 % external solution       Lancets (ONETOUCH DELICA PLUS [id number]) MISC Use to test blood glucose once daily      metroNIDAZOLE (METROCREAM) 0.75 % cream APPLY TO RASH ON FACE TWO TIMES A DAY      mupirocin (BACTROBAN) 2 % ointment APPLY TO OPEN SORES TWO TIMES A DAY      nystatin-triamcinolone (MYCOLOG II) 977491-6.1 UNIT/GM-% cream APPLY TOPICALLY THREE TIMES A DAY      pantoprazole (PROTONIX) 40 MG tablet TAKE ONE TABLET BY MOUTH TWICE A DAY BEFORE MEALS      polyethylene glycol (GLYCOLAX) 17 GM/SCOOP powder Take 17 g by mouth daily      Semaglutide, 1 MG/DOSE, (OZEMPIC, 1 MG/DOSE,) 2 MG/1.5ML SOPN Inject 1 mg into the skin every 7 days      predniSONE (DELTASONE) 10 MG tablet Take 40 mg by mouth daily as needed      inFLIXimab (INFLECTRA) 100 MG SOLR Infuse intravenously every 28 days      linaclotide (LINZESS) 145 MCG capsule Take 1 capsule by mouth 2 times daily 60 capsule 1     No current facility-administered medications for this visit.        Review of Systems     REVIEW OF SYSTEM: See HPI above    PhysicalExam     Vitals:    10/11/22 1408   BP: (!) 141/71   Pulse: 90   Temp: 97.3 °F (36.3 °C)   TempSrc: Temporal       PHYSICAL EXAM  BP (!) 141/71   Pulse 90   Temp 97.3 °F (36.3 °C) (Temporal)     GENERAL: No acute distress, alert and oriented. EYES: EOMI, Anti-icteric. NOSE: On anterior rhinoscopy there is no epistaxis, nasal mucosa moist and normal appearing, no purulent drainage. EARS: Normal external appearance; on portable otomicroscopy:     -Ad: External auditory canal without stenosis, tympanic membrane appears thin and intact, no evidence of middle ear effusion. Tympanic membrane mobile pneumatic otoscopy. -As: External auditory canal without stenosis, tympanic membrane with 10% central posterior pars tensa perforation with clean margins. No otorrhea. FACE: HB 1/6 bilaterally, symmetric appearing, sensation equal bilaterally  ORAL CAVITY: No masses or lesions visualized or palpated, uvula is midline, moist mucous membranes, no oropharyngeal masses or oropharyngeal obstruction  NECK: Normal range of motion, no thyromegaly, trachea is midline, no palpable lymphadenopathy or neck masses, no crepitus  CHEST: Normal respiratory effort, breathing comfortably, no retractions  SKIN: No rashes, normal appearing skin, no evidence of skin lesions/tumors  NEURO: Cranial Nerves 2, 3, 4, 5, 6, 7, 11, 12 grossly intact bilaterally     I have performed a head and neck physical exam personally or was physically present during the key or critical portions of the service. Data/Imaging Review     EXAM: US THYROID       INDICATION: Thyroid nodule, Family history of thyroid cancerHistory of benign FNA of the dominant nodule in the right thyroid lobe. COMPARISON: Multiple priors most recent thyroid ultrasound 10/7/2021.         FINDINGS:       RIGHT LOBE: Measures 4.3 x 2.2 x 1.2 cm       LEFT LOBE: Measures 3.9 x 1.2 x 1.4 cm       ISTHMUS: Measures 0.2 cm       NODULES: RIGHT LOBE: Inferior right thyroid lobe, hypoechoic nodule, measuring 0.8 x 0.7 x 0.6 cm, unchanged. LEFT LOBE: No nodules       ISTHMUS: No nodules       OTHER FINDINGS: None. Impression       1. Stable right thyroid nodule which was previously biopsied. 2.  No new nodules. Assessment and Plan     1. Thyroid nodule  2. Family history of thyroid cancer  -Stable right thyroid nodule on recent thyroid ultrasound. Repeat thyroid ultrasound in 1 year given family history of hurthle cell thyroid cancer. 3. Mixed conductive and sensorineural hearing loss of left ear with unrestricted hearing of right ear  4. Perforation of left tympanic membrane  -No perforation noted on right tympanic membrane, appears pinpoint perforation has resolved on that side. Left tympanic membrane perforation appears similar to prior findings. Small associated mixed hearing loss on the left, normal to borderline mild hearing loss on the right. We will continue to monitor, repeat comprehensive audiological evaluation in 1 year unless hearing changes.  -Hearing protection and loud noise environments    5. Dysfunction of both eustachian tubes  -Continue Flonase daily    Follow-Up     Return in about 1 year (around 10/11/2023). Dr. [place]ette [place] [address]  Department of Otolaryngology/Head and Neck Surgery  10/11/22    Medical Decision Making: The following items were considered in medical decision making:  Independent review of images  Review / order clinical lab tests  Review / order radiology tests  Decision to obtain old records      This note was generated completely or in part utilizing Dragon dictation speech recognition software. Occasionally, words are mistranscribed and despite editing, the text may contain inaccuracies due to incorrect word recognition. If further clarification is needed please contact the office at [phone].

## 2022-10-11 NOTE — PROGRESS NOTES
[facility]  Division of Audiology/Otolaryngology    10/11/2022     Patient name: Idalia Arreguin  Primary Care Physician: Coreen Jones MD   Medical Record Number: [id number]     Idalia Arreguin   1980, 39 y.o. female   [id number]       Referring Provider: Melia Atkins DO  Referral Type: In an order in [facility]    Reason for Visit: Evaluation of the cause of disorders of hearing, tinnitus, or balance. ADULT AUDIOLOGIC EVALUATION                    Idalia Arreguin is a 39 y.o. female seen today, 10/11/2022, for audiologic evaluation. Patient was seen by referring provider Melia Atkins DO following today's evaluation. AUDIOLOGIC AND OTHER PERTINENT MEDICAL HISTORY:      Idalia Arreguin notes history of recurrent otitis media with multiple PE tubes placed in each ear. Most recent tubes fell out approximately a year ago on the right and two months ago on the left. Reports occasional clicking tinnitus on the right when head is held in a certain position, and intermittent ringing tinnitus bilaterally. Medical history is reportedly significant for hypertension. No other otologic factors noted. IMPRESSIONS:      Results are consistent with sensorineural hearing loss on the right and conductive hearing loss on the left with excellent word recognition for both ears, and middle ear function consistent with recurrent PE tubes. Patient to follow medical recommendations per Melia Atkins DO.    ASSESSMENT AND FINDINGS:     Otoscopy revealed: Perforated tympanic membrane bilaterally    Reliability: Good   Transducer: Inserts    RIGHT EAR:  Hearing Sensitivity: Mild sensorineural hearing loss rising to normal  Speech Recognition Threshold: 20 dB HL  Word Recognition: Excellent (%), based on NU-6   Tympanometry: Flat, no peak pressure or compliance, Type B tympanogram, with large ear canal volume, consistent with patent PE tube/TM perforation.   Acoustic Reflexes: Ipsilateral: Present at elevated sensation levels and Absent at isolated frequencies. LEFT EAR:  Hearing Sensitivity: Moderate conductive hearing loss rising to normal with 15-25 dB air-bone gaps at 1-3K Hz  Speech Recognition Threshold: 25 dB HL  Word Recognition: Excellent (%), based on NU-6   Tympanometry: Flat, no peak pressure or compliance, Type B tympanogram, with large ear canal volume, consistent with patent PE tube/TM perforation. Acoustic Reflexes: Ipsilateral: Present at normal sensation levels and Present at elevated sensation levels at isolated frequencies. NOTE: An asymmetry of >10 dB is present from 250-500 Hz in left ear        COUNSELING:      Reviewed purpose of testing completed today, general auditory system function, and results obtained today. The following items are recommended based on patient report and results from today's appointment:   - Continue medical follow-up with Melia Atkins DO.   - Retest hearing as medically indicated and/or sooner if a change in hearing is noted. Chart CC'd to: Melia Atkins DO      Degree of   Hearing Sensitivity dB Range   Within Normal Limits (WNL) 0 - 20   Mild 20 - 40   Moderate 40 - 55   Moderately-Severe 55 - 70   Severe 70 - 90   Profound 90 +        RECOMMENDATIONS:        Melia Atkins DO to medically advise.     Amee Miller  Audiologist    Electronically signed by Amee Miller on 10/11/22 at 1:42 PM.

## 2022-11-24 ENCOUNTER — PATIENT MESSAGE (OUTPATIENT)
Dept: ENT CLINIC | Age: 42
End: 2022-11-24

## 2022-11-28 NOTE — TELEPHONE ENCOUNTER
From: Agueda Maher  To: Dr. Tennille Watts: 11/24/2022 9:48 AM EST  Subject: Ears    Hello, I have been having more pain and drainage from ears. Should I schedule an appointment?     Thanks  [employer]

## 2022-11-30 ENCOUNTER — OFFICE VISIT (OUTPATIENT)
Dept: PRIMARY CARE CLINIC | Age: 42
End: 2022-11-30
Payer: COMMERCIAL

## 2022-11-30 ENCOUNTER — TELEPHONE (OUTPATIENT)
Dept: ENDOCRINOLOGY | Age: 42
End: 2022-11-30

## 2022-11-30 VITALS
TEMPERATURE: 98.7 F | RESPIRATION RATE: 18 BRPM | DIASTOLIC BLOOD PRESSURE: 80 MMHG | HEIGHT: 64 IN | BODY MASS INDEX: 47.97 KG/M2 | OXYGEN SATURATION: 97 % | WEIGHT: 281 LBS | HEART RATE: 80 BPM | SYSTOLIC BLOOD PRESSURE: 134 MMHG

## 2022-11-30 DIAGNOSIS — I10 ESSENTIAL HYPERTENSION: ICD-10-CM

## 2022-11-30 DIAGNOSIS — L73.2 HIDRADENITIS SUPPURATIVA: Primary | ICD-10-CM

## 2022-11-30 DIAGNOSIS — K58.1 IRRITABLE BOWEL SYNDROME WITH CONSTIPATION: ICD-10-CM

## 2022-11-30 DIAGNOSIS — R73.9 ELEVATED BLOOD SUGAR: ICD-10-CM

## 2022-11-30 DIAGNOSIS — F32.A ANXIETY AND DEPRESSION: ICD-10-CM

## 2022-11-30 DIAGNOSIS — F41.9 ANXIETY AND DEPRESSION: ICD-10-CM

## 2022-11-30 LAB — HBA1C MFR BLD: 5.5 %

## 2022-11-30 PROCEDURE — G8417 CALC BMI ABV UP PARAM F/U: HCPCS | Performed by: FAMILY MEDICINE

## 2022-11-30 PROCEDURE — 3074F SYST BP LT 130 MM HG: CPT | Performed by: FAMILY MEDICINE

## 2022-11-30 PROCEDURE — 83036 HEMOGLOBIN GLYCOSYLATED A1C: CPT | Performed by: FAMILY MEDICINE

## 2022-11-30 PROCEDURE — 3078F DIAST BP <80 MM HG: CPT | Performed by: FAMILY MEDICINE

## 2022-11-30 PROCEDURE — G8427 DOCREV CUR MEDS BY ELIG CLIN: HCPCS | Performed by: FAMILY MEDICINE

## 2022-11-30 PROCEDURE — 99214 OFFICE O/P EST MOD 30 MIN: CPT | Performed by: FAMILY MEDICINE

## 2022-11-30 PROCEDURE — G8484 FLU IMMUNIZE NO ADMIN: HCPCS | Performed by: FAMILY MEDICINE

## 2022-11-30 PROCEDURE — 1036F TOBACCO NON-USER: CPT | Performed by: FAMILY MEDICINE

## 2022-11-30 RX ORDER — BUSPIRONE HYDROCHLORIDE 15 MG/1
15 TABLET ORAL 2 TIMES DAILY
Qty: 60 TABLET | Refills: 2 | Status: SHIPPED | OUTPATIENT
Start: 2022-11-30

## 2022-11-30 RX ORDER — AMLODIPINE BESYLATE 5 MG/1
5 TABLET ORAL DAILY
Qty: 30 TABLET | Refills: 2 | Status: SHIPPED | OUTPATIENT
Start: 2022-11-30

## 2022-11-30 RX ORDER — SEMAGLUTIDE 1.34 MG/ML
1 INJECTION, SOLUTION SUBCUTANEOUS
Qty: 2 ADJUSTABLE DOSE PRE-FILLED PEN SYRINGE | Refills: 3 | Status: CANCELLED | OUTPATIENT
Start: 2022-11-30

## 2022-11-30 ASSESSMENT — ENCOUNTER SYMPTOMS
COUGH: 0
SHORTNESS OF BREATH: 0
EYE ITCHING: 0
SORE THROAT: 0
DIARRHEA: 0
ABDOMINAL PAIN: 1
BLOOD IN STOOL: 0
VOMITING: 0
NAUSEA: 0

## 2022-11-30 NOTE — PROGRESS NOTES
Chief Complaint   Patient presents with    Hypertension     Follow up visit - requesting referral to endocrinology also had questions regarding A1C         Magalie Granados is a 43 y.o. female who presents for routine visit    Pt has a hx of psoriatic arthritis and is followed by Rheumatology and is on IV inflectra medication and had recent office visit and was felt to be stable and with normal CMP bloodwork     Pt has a hx of Hidradenitis suppurativa since she was a teenager and is followed by [facility] [place] and has had multiple surgeries (most recently 01/2020) along with an episode sepsis 2018. 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 Pt is with good control on IV inflectra medication and does take bursts of prednisone as needed recent flareups and also gets kenalog injections into lesions by Dermatology --- Patient has been scheduled for deroofing procedures in her R torso and L breast area to prevent worsening flareups     Patient has a past medical history significant for HTN and is on norvasc 5 mg daily     Pt is on miralax and protonix for irritable bowel and is followed by GI      Pt is on lexapro and buspar dosage was increased with 75% improvement of her anxiety/depression. Pt is followed by Weight Management and is on ozempic medication and Dermatologist also recommends this medication for her Hidradenitis suppurativa    Pt is followed by ENT for a thyroid nodule and recent thyroid US showed that it was stable      Pt has a hx of PCOS and endometriosis and is followed by OB/Gyne and is on mirena medication      Pt is a nonsmoker and denies alcohol use; Pt has a certified medical marijuana card     FHx positive for early CAD (Father and PUncle) and thyroid cancer (sister)     Pt did complete her COVID vaccine series      Review of Systems   Constitutional:  Positive for chills (along with night sweats at times with flareups of her HS) and fatigue. Negative for fever. HENT:  Negative for nosebleeds and sore throat. Eyes:  Negative for itching. Negative blurred vision or diplopia   Respiratory:  Negative for cough and shortness of breath. Cardiovascular:  Positive for leg swelling (chronic and pt reports normal LE venous dopplers 2 years ago). Negative for chest pain and palpitations. Gastrointestinal:  Positive for abdominal pain (crampy at times (chronic)). Negative for blood in stool, diarrhea, nausea and vomiting. Negative melena or indigestion   Endocrine: Negative for polyuria. Genitourinary:  Negative for dysuria and hematuria. Musculoskeletal:  Positive for arthralgias. Skin:  Positive for rash (psoriatic at times and with flareups of her hidradenitis suppurativa). Neurological:  Negative for dizziness, seizures, syncope, speech difficulty, weakness and headaches. Psychiatric/Behavioral:  Positive for dysphoric mood (at times). The patient is nervous/anxious (at times). Vitals:    11/30/22 1028   BP: 134/80   Pulse: 80   Resp: 18   Temp: 98.7 °F (37.1 °C)   SpO2: 97%         Physical Exam  Vitals reviewed. Constitutional:       General: She is not in acute distress. Appearance: She is obese. HENT:      Mouth/Throat:      Mouth: Mucous membranes are moist.      Pharynx: Oropharynx is clear. Eyes:      General: No scleral icterus. Comments: Pink conjunctivae   Neck:      Thyroid: No thyromegaly. Cardiovascular:      Heart sounds: Normal heart sounds. No murmur heard. No friction rub. No gallop. Pulmonary:      Effort: Pulmonary effort is normal.      Breath sounds: Normal breath sounds. Abdominal:      Palpations: Abdomen is soft. Tenderness: There is no abdominal tenderness. There is no guarding or rebound. Musculoskeletal:      Comments: No leg edema noted B/L   Lymphadenopathy:      Cervical: No cervical adenopathy. Skin:     Findings: No rash.    Neurological:      Mental Status: She is alert. Comments: Cranial nerves 2 - 12 grossly intact; Muscle strength 5/5 throughout   Psychiatric:         Mood and Affect: Mood normal.       ASSESSMENT AND PLAN    1. Essential hypertension  Patient clinically stable on present medications and denies any CP or SOB and has resolution of her leg swelling on a low sodium diet  - amLODIPine (NORVASC) 5 MG tablet; Take 1 tablet by mouth daily  Dispense: 30 tablet; Refill: 2    2. Anxiety and depression  Patient clinically stable on present medications  - busPIRone (BUSPAR) 15 MG tablet; Take 15 mg by mouth in the morning and 15 mg in the evening. Dispense: 60 tablet; Refill: 2    3. Hidradenitis suppurativa  Patient is being managed by Dermatology   - Katherin Young MD, Endocrinology, [facility]    4. Irritable bowel syndrome with constipation  Patient clinically stable on present medications and is with a nontender abdomen on PE      García Howard MD      Return in about 3 months (around 2/28/2023).

## 2022-11-30 NOTE — TELEPHONE ENCOUNTER
Dr Payton Gonzales,  Just checking on dx on this referral. Pt calling to schedule and she states her other diagnosis are: thyroid nodule,pcos, sister has thyroid cancer  Ok to schedule or does PCP office need to change diagnosis?     Internal Reasons: Specialty Services Required [5]   Diagnosis: L73.2 (ICD-10-CM) - Hidradenitis suppurativa

## 2023-01-09 ENCOUNTER — PATIENT MESSAGE (OUTPATIENT)
Dept: ENT CLINIC | Age: 43
End: 2023-01-09

## 2023-01-11 ENCOUNTER — OFFICE VISIT (OUTPATIENT)
Dept: PULMONOLOGY | Age: 43
End: 2023-01-11
Payer: COMMERCIAL

## 2023-01-11 VITALS
WEIGHT: 282.6 LBS | OXYGEN SATURATION: 97 % | DIASTOLIC BLOOD PRESSURE: 80 MMHG | BODY MASS INDEX: 48.25 KG/M2 | HEART RATE: 84 BPM | HEIGHT: 64 IN | SYSTOLIC BLOOD PRESSURE: 112 MMHG

## 2023-01-11 DIAGNOSIS — G47.33 OBSTRUCTIVE SLEEP APNEA SYNDROME: Primary | Chronic | ICD-10-CM

## 2023-01-11 DIAGNOSIS — I10 PRIMARY HYPERTENSION: Chronic | ICD-10-CM

## 2023-01-11 DIAGNOSIS — E66.01 MORBID OBESITY, UNSPECIFIED OBESITY TYPE (HCC): Chronic | ICD-10-CM

## 2023-01-11 PROCEDURE — G8484 FLU IMMUNIZE NO ADMIN: HCPCS | Performed by: NURSE PRACTITIONER

## 2023-01-11 PROCEDURE — G8427 DOCREV CUR MEDS BY ELIG CLIN: HCPCS | Performed by: NURSE PRACTITIONER

## 2023-01-11 PROCEDURE — 1036F TOBACCO NON-USER: CPT | Performed by: NURSE PRACTITIONER

## 2023-01-11 PROCEDURE — 99214 OFFICE O/P EST MOD 30 MIN: CPT | Performed by: NURSE PRACTITIONER

## 2023-01-11 PROCEDURE — G8417 CALC BMI ABV UP PARAM F/U: HCPCS | Performed by: NURSE PRACTITIONER

## 2023-01-11 PROCEDURE — 3079F DIAST BP 80-89 MM HG: CPT | Performed by: NURSE PRACTITIONER

## 2023-01-11 PROCEDURE — 3074F SYST BP LT 130 MM HG: CPT | Performed by: NURSE PRACTITIONER

## 2023-01-11 ASSESSMENT — SLEEP AND FATIGUE QUESTIONNAIRES
HOW LIKELY ARE YOU TO NOD OFF OR FALL ASLEEP WHILE WATCHING TV: 1
HOW LIKELY ARE YOU TO NOD OFF OR FALL ASLEEP WHILE SITTING AND READING: 1
ESS TOTAL SCORE: 5
HOW LIKELY ARE YOU TO NOD OFF OR FALL ASLEEP WHILE LYING DOWN TO REST IN THE AFTERNOON WHEN CIRCUMSTANCES PERMIT: 2
HOW LIKELY ARE YOU TO NOD OFF OR FALL ASLEEP WHILE SITTING AND TALKING TO SOMEONE: 0
HOW LIKELY ARE YOU TO NOD OFF OR FALL ASLEEP WHEN YOU ARE A PASSENGER IN A CAR FOR AN HOUR WITHOUT A BREAK: 0
HOW LIKELY ARE YOU TO NOD OFF OR FALL ASLEEP WHILE SITTING INACTIVE IN A PUBLIC PLACE: 0
HOW LIKELY ARE YOU TO NOD OFF OR FALL ASLEEP IN A CAR, WHILE STOPPED FOR A FEW MINUTES IN TRAFFIC: 0
HOW LIKELY ARE YOU TO NOD OFF OR FALL ASLEEP WHILE SITTING QUIETLY AFTER LUNCH WITHOUT ALCOHOL: 1

## 2023-01-11 NOTE — ASSESSMENT & PLAN NOTE
Chronic-Stable: Reviewed and analyzed results of physiologic download from patient's machine and reviewed with patient. Supplies and parts as needed for her machine. These are medically necessary. Limit caffeine use after 3pm. Based on the analyzed data will continue with current settings. Stable on her machine at current settings, getting benefit from the use, and having minimal side effects. Will place order for new machine. Patients machine is over 11years old, she is gaining benefit from machine use, and machine is medically necessary. Will see her back between 31 and 90 days for new machine compliance. Encouraged her to contact the office with any questions or concerns.

## 2023-01-11 NOTE — PROGRESS NOTES
Rafa Robertson CNP [address]  [address] [facility]  [address], [address] [facility]- [phone]   [facility] Celio Encarnacion. [address] [place] [address]. Mena Hu [address] [phone] SLEEP MEDICINE  [address] [place] [address]  [phone]      Assessment/Plan:      1. Obstructive sleep apnea syndrome  Assessment & Plan:  Chronic-Stable: Reviewed and analyzed results of physiologic download from patient's machine and reviewed with patient. Supplies and parts as needed for her machine. These are medically necessary. Limit caffeine use after 3pm. Based on the analyzed data will continue with current settings. Stable on her machine at current settings, getting benefit from the use, and having minimal side effects. Will place order for new machine. Patients machine is over 11years old, she is gaining benefit from machine use, and machine is medically necessary. Will see her back between 31 and 90 days for new machine compliance. Encouraged her to contact the office with any questions or concerns. 2. Primary hypertension  Assessment & Plan:   Chronic- Stable. Discussed the importance of treating obstructive sleep apnea as part of the management of this disorder. Cont any meds per PCP and other physicians. 3. Morbid obesity, unspecified obesity type ([address]  Assessment & Plan:   Chronic-not stable:  Discussed importance of treating obstructive sleep apnea and getting sufficient sleep to assist with weight control. Encouraged her to work on weight loss through diet and exercise. Recommended DASH or Mediterranean diets. Reviewed, analyzed, and documented physiologic data from patient's PAP machine. This information was analyzed to assess complexity and medical decision making in regards to further testing and management. The primary encounter diagnosis was Obstructive sleep apnea syndrome.  Diagnoses of Primary hypertension and Morbid obesity, unspecified obesity type [facility]) were also pertinent to this visit. The chronic medical conditions listed are directly related to the primary diagnosis listed above. The management of the primary diagnosis affects the secondary diagnosis and vice versa. Subjective:   Subjective   Patient ID: Agueda Maher is a 43 y.o. female. Chief Complaint   Patient presents with    Sleep Apnea       HPI:  Machine Modem/Download Info:  Compliance (hours/night): 9.25 hrs/night  % of nights >= 4 hrs: 99 %  Download AHI (/hour): 0.3 /HR  Average CPAP Pressure : 15.6 cmH2O      APAP - Settings  Pressure Min: 14 cmH2O  Pressure Max: 20 cmH2O                 Comfort Settings  Flex/EPR (0-3): 3 PAP Mask  Mask Type: Nasal mask     Agueda Maher presents today for follow-up for sleep apnea. she reports she is doing well with her machine. The pressure on her machine is comfortable and she is waking rested. Her machine is over 11years old and she would like to have it replaced. she denies headaches, congestion, nosebleeds, dryness, aerophagia, or drowsiness while driving. her mask is comfortable and is fitting well.     DME Company - Other [employer]    [place] Sleepiness Score: 5    Social History     Socioeconomic History    Marital status:      Spouse name: Not on file    Number of children: 0    Years of education: Not on file    Highest education level: Not on file   Occupational History    Occupation:    Tobacco Use    Smoking status: Never    Smokeless tobacco: Never   Substance and Sexual Activity    Alcohol use: No    Drug use: No    Sexual activity: Not Currently     Partners: Male   Other Topics Concern    Not on file   Social History Narrative    Not on file     Social Determinants of Health     Financial Resource Strain: Low Risk     Difficulty of Paying Living Expenses: Not hard at all   Food Insecurity: No Food Insecurity    Worried About Running Out of Food in the Last Year: Never true    Ran Out of Food in the Last Year: Never true   Transportation Needs: Not on file   Physical Activity: Not on file   Stress: Not on file   Social Connections: Not on file   Intimate Partner Violence: Not on file   Housing Stability: Not on file       Current Outpatient Medications   Medication Instructions    amLODIPine (NORVASC) 5 mg, Oral, DAILY    benzoyl peroxide 5 % external liquid No dose, route, or frequency recorded. blood glucose test strips (ASCENSIA AUTODISC VI;ONE TOUCH ULTRA TEST VI) strip 1 each, In Vitro, DAILY, As needed. busPIRone (BUSPAR) 15 mg, Oral, 2 TIMES DAILY    cetirizine (ZYRTEC) 10 mg, Oral, DAILY    clindamycin (CLEOCIN T) 1 % external solution No dose, route, or frequency recorded. escitalopram (LEXAPRO) 20 MG tablet TAKE ONE TABLET BY MOUTH DAILY    fluticasone (FLONASE) 50 MCG/ACT nasal spray 1 spray, Each Nostril, DAILY    glucose monitoring (FREESTYLE FREEDOM) kit 1 kit, Does not apply, DAILY    inFLIXimab (INFLECTRA) 100 MG SOLR IntraVENous, EVERY 28 DAYS    Lancets (ONETOUCH DELICA PLUS [id number]) MISC Use to test blood glucose once daily    lidocaine-prilocaine (EMLA) 2.5-2.5 % cream No dose, route, or frequency recorded.     linaclotide (LINZESS) 145 mcg, Oral, 2 TIMES DAILY    metroNIDAZOLE (METROCREAM) 0.75 % cream APPLY TO RASH ON FACE TWO TIMES A DAY    mupirocin (BACTROBAN) 2 % ointment APPLY TO OPEN SORES TWO TIMES A DAY    nystatin-triamcinolone (MYCOLOG II) 628531-3.1 UNIT/GM-% cream APPLY TOPICALLY THREE TIMES A DAY    ondansetron (ZOFRAN ODT) 4 mg, Oral, EVERY 8 HOURS PRN    Ozempic (1 MG/DOSE) 1 mg, SubCUTAneous, EVERY 7 DAYS    pantoprazole (PROTONIX) 40 MG tablet TAKE ONE TABLET BY MOUTH TWICE A DAY BEFORE MEALS    polyethylene glycol (GLYCOLAX) 17 g, Oral, DAILY    spironolactone (ALDACTONE) 100 mg, Oral          Vitals:  Weight BMI   Wt Readings from Last 3 Encounters:   01/11/23 282 lb 9.6 oz (128.2 kg)   11/30/22 281 lb (127.5 kg)   09/08/22 279 lb (126.6 kg)    Body mass index is 48.51 kg/m².      BP HR SaO2   BP Readings from Last 3 Encounters:   01/11/23 112/80   11/30/22 134/80   10/11/22 (!) 141/71    Pulse Readings from Last 3 Encounters:   01/11/23 84   11/30/22 80   10/11/22 90    SpO2 Readings from Last 3 Encounters:   01/11/23 97%   11/30/22 97%   09/08/22 97%        Electronically signed by IOANA Crockett on 1/11/2023 at 1:16 PM

## 2023-01-11 NOTE — PROGRESS NOTES
Diagnosis: [x] MARCO (G47.33) [] CSA (G47.31) [] Apnea (G47.30)   Length of Need: [x] 15 Months [] 99 Months [] Other:   Machine (BEBETO!): [] Respironics Dream Station      Auto [x] ResMed AirSense 11 Auto with modem for remote monitoring [] Other:     [x]  CPAP () [] Bilevel ()   Mode: [x] Auto [] Spontaneous    Mode: [] Auto [] Spontaneous          APAP - Settings  Pressure Min: 14 cmH2O  Pressure Max: 20 cmH2O               Comfort Settings: Ramp Pressure: 5 cmH2O  Ramp time: 15 min  Flex/EPR - 3 full time                                  For ResMed Bileve (TiMax-4 sec   TiMin- 0.2 sec)     Humidifier: [x] Heated ()        [x] Water chamber replacement ()/ 1 per 6 months        Mask:   [x] Nasal () /1 per 3 months [] Full Face () /1 per 3 months   [x] Patient choice -Size and fit mask [] Patient Choice - Size and fit mask   [] Dispense: [] Dispense:   [x] Headgear () / 1 per 3 months [] Headgear () / 1 per 3 months   [x] Replacement Nasal Cushion ()/2 per month [] Interface Replacement ()/1 per month   [] Replacement Nasal Pillows ()/2 per month         Tubing: [x] Heated ()/1 per 3 months    [] Standard ()/1 per 3 months [] Other:           Filters: [x] Non-disposable ()/1 per 6 months     [x] Ultra-Fine, Disposable ()/2 per month        Miscellaneous: [] Chin Strap ()/ 1 per 6 months [] O2 bleed-in:        LPM   [] Oxymetry on CPAP/Bilevel []  Other:         Start Order Date: 01/11/23    MEDICAL JUSTIFICATION:  I, the undersigned, certify that the above prescribed supplies are medically necessary for this patients wellbeing. In my opinion, the supplies are both reasonable and necessary in reference to accepted standards of medicalpractice in treatment of this patients condition.     Katty Bhatia NP    NPI: [id number]       Order Signed Date: 01/11/23  [facility]  Pulmonary, Sleep, and Critical Care    Pulmonary, Sleep, and Critical Care  [address] [facility]. [address] [facility] , [address]                                    Dmitry Lind  Phone: [phone]    Fax:  Tai Nori Jovel  1980  [address] [place]  [phone] (home)   [phone] (mobile)      Insurance Info (confirm with patient if correct):  Payer/Plan Subscr  Sex Relation Sub.  Ins. ID Effective Group Num

## 2023-01-11 NOTE — LETTER
[facility] Sleep Medicine  [place] [address] [address] [address]  Phone: [phone]  Fax: [phone]    January 11, 2023       Patient: Sandip Briones   MR Number: [id number]   YOB: 1980   Date of Visit: 1/11/2023       Griselda Osborne was seen for a follow up visit today. Here is my assessment and plan as well as an attached copy of her visit today:    HTN (hypertension)   Chronic- Stable. Discussed the importance of treating obstructive sleep apnea as part of the management of this disorder. Cont any meds per PCP and other physicians. Morbid obesity, unspecified obesity type ([address]   Chronic-not stable:  Discussed importance of treating obstructive sleep apnea and getting sufficient sleep to assist with weight control. Encouraged her to work on weight loss through diet and exercise. Recommended DASH or Mediterranean diets. Obstructive sleep apnea syndrome  Chronic-Stable: Reviewed and analyzed results of physiologic download from patient's machine and reviewed with patient. Supplies and parts as needed for her machine. These are medically necessary. Limit caffeine use after 3pm. Based on the analyzed data will continue with current settings. Stable on her machine at current settings, getting benefit from the use, and having minimal side effects. Will place order for new machine. Patients machine is over 11years old, she is gaining benefit from machine use, and machine is medically necessary. Will see her back between 31 and 90 days for new machine compliance. Encouraged her to contact the office with any questions or concerns. If you have questions or concerns, please do not hesitate to call me. I look forward to following Honorio Roberts along with you.     Sincerely,    IOANA Marshall    CC providers:  Sean Ray MD  [address] [place] [id number]  [address] [place]

## 2023-01-13 RX ORDER — OFLOXACIN 3 MG/ML
SOLUTION/ DROPS OPHTHALMIC
Qty: 1 EACH | Refills: 0 | Status: SHIPPED | OUTPATIENT
Start: 2023-01-13

## 2023-01-13 NOTE — TELEPHONE ENCOUNTER
Patient with left otorrhea, tinnitus, left otalgia. Noted to have left tympanic membrane perforation on last examination. Ofloxacin drops sent to pharmacy, use on left ear twice daily x10 days.   If not improved she will call the office and set up a follow-up appointment

## 2023-02-03 NOTE — TELEPHONE ENCOUNTER
Medication:   Requested Prescriptions     Pending Prescriptions Disp Refills    escitalopram (LEXAPRO) 20 MG tablet [Pharmacy Med Name: ESCITALOPRAM 20MG TABLETS] 30 tablet 3     Sig: TAKE 1 TABLET BY MOUTH EVERY DAY        Last Filled:  9/11/2022, #30 with 3 refills    Patient Phone Number: [phone] (home)     Last appt: 11/30/2022   Next appt: 2/28/2023    Last OARRS: No flowsheet data found.

## 2023-02-05 RX ORDER — ESCITALOPRAM OXALATE 20 MG/1
TABLET ORAL
Qty: 30 TABLET | Refills: 2 | Status: SHIPPED | OUTPATIENT
Start: 2023-02-05

## 2023-02-20 PROBLEM — E11.8 TYPE 2 DIABETES MELLITUS WITH COMPLICATION, WITHOUT LONG-TERM CURRENT USE OF INSULIN (HCC): Status: ACTIVE | Noted: 2019-01-04

## 2023-02-20 PROBLEM — M67.912 BILATERAL ROTATOR CUFF DYSFUNCTION: Status: ACTIVE | Noted: 2021-06-22

## 2023-02-20 PROBLEM — H65.23 BILATERAL CHRONIC SEROUS OTITIS MEDIA: Status: ACTIVE | Noted: 2022-03-04

## 2023-02-20 PROBLEM — M17.0 PRIMARY OSTEOARTHRITIS OF BOTH KNEES: Status: ACTIVE | Noted: 2020-06-16

## 2023-02-20 PROBLEM — K58.0 IRRITABLE BOWEL SYNDROME WITH DIARRHEA: Status: ACTIVE | Noted: 2017-01-23

## 2023-02-20 PROBLEM — M47.812 CERVICAL SPONDYLOSIS WITHOUT MYELOPATHY: Status: ACTIVE | Noted: 2021-06-22

## 2023-02-20 PROBLEM — M67.911 BILATERAL ROTATOR CUFF DYSFUNCTION: Status: ACTIVE | Noted: 2021-06-22

## 2023-02-20 PROBLEM — H33.311 HORSESHOE TEAR OF RETINA OF RIGHT EYE WITHOUT DETACHMENT: Status: ACTIVE | Noted: 2021-05-28

## 2023-02-20 PROBLEM — M47.817 LUMBOSACRAL SPONDYLOSIS WITHOUT MYELOPATHY: Status: ACTIVE | Noted: 2021-06-22

## 2023-02-20 PROBLEM — L40.50 PSORIATIC ARTHRITIS (HCC): Status: ACTIVE | Noted: 2018-05-01

## 2023-02-20 PROBLEM — G89.29 OTHER CHRONIC PAIN: Status: ACTIVE | Noted: 2020-06-16

## 2023-02-20 PROBLEM — N93.9 ABNORMAL UTERINE BLEEDING (AUB): Status: ACTIVE | Noted: 2022-01-27

## 2023-02-20 PROBLEM — L71.9 ROSACEA: Status: ACTIVE | Noted: 2018-03-05

## 2023-02-20 PROBLEM — L60.0 INGROWN RIGHT GREATER TOENAIL: Status: ACTIVE | Noted: 2022-03-23

## 2023-02-20 PROBLEM — M79.674 PAIN IN TOE OF RIGHT FOOT: Status: ACTIVE | Noted: 2022-03-23

## 2023-02-20 PROBLEM — Z87.2 HISTORY OF PSORIATIC ARTHRITIS: Status: ACTIVE | Noted: 2018-04-15

## 2023-02-20 NOTE — PROGRESS NOTES
Patient ID:   Guanakito Carrero is a 43 y.o. female    Chief Complaint:   Guanakito Carrero is seen for initial evaluation of   L73.2 (ICD-10-CM) - Hidradenitis suppurativa   N80.9 (ICD-10-CM) - Endometriosis   E28.2 (ICD-10-CM) - PCOS (polycystic ovarian syndrome)     Referred by Dr. Ezekiel Rawls MD       Subjective:   Guanakito Carrero is sent here for three multiple medical problems. First two are outside endocrine specialty. Will discuss PCOS. PCOS diagnosed at age 13. Had fertility issues. Had endometrioses. Had one miscarriage. No kids. Had mirena in Oct 2022. No cycles since Oct 2022. On spironolactone 100 mg bid     Has excessive hair growth on face, neck, chin. Not a lot of hair loss. Weight is gradually. Diet: eating less than 1500 calories. Saw weight loss physician with [facility]. Taking ozempic 1 mg once a week on Mondays. Now managed bu pcp. Exercise : limited due to hidradenitis suppurativa    Family history of PCOS or excessive hair growth:  Mother has PCOS    Denies exposure to testosterone or OTC compounded creams     Type 2 DM diagnosed in 2018   Now on Ozempic     The following portions of the patient's history were reviewed and updated as appropriate:      Family History   Problem Relation Age of Onset    Kidney Disease Mother         CKD    Hypertension Mother     Diabetes Mother     High Blood Pressure Father     COPD Father     Emphysema Father     Elevated Lipids Sister         hypertriglyceridemia    Thyroid Cancer Sister     Asthma Brother     Sleep Apnea Brother     Depression Brother     Anxiety Disorder Brother     Other Maternal Grandfather         murdered    Stroke Paternal Grandmother     Heart Disease Paternal Grandfather          Social History     Socioeconomic History    Marital status:      Spouse name: Not on file    Number of children: 0    Years of education: Not on file    Highest education level: Not on file   Occupational History    Occupation:    Tobacco Use    Smoking status: Never    Smokeless tobacco: Never   Vaping Use    Vaping Use: Never used   Substance and Sexual Activity    Alcohol use: No    Drug use: No    Sexual activity: Yes     Partners: Male   Other Topics Concern    Not on file   Social History Narrative    Not on file     Social Determinants of Health     Financial Resource Strain: Low Risk     Difficulty of Paying Living Expenses: Not hard at all   Food Insecurity: No Food Insecurity    Worried About Running Out of Food in the Last Year: Never true    Ran Out of Food in the Last Year: Never true   Transportation Needs: Not on file   Physical Activity: Not on file   Stress: Not on file   Social Connections: Not on file   Intimate Partner Violence: Not on file   Housing Stability: Not on file         Past Medical History:   Diagnosis Date    Endometriosis     Hypertension     Lumbar disc disorder     Nausea & vomiting     Polycystic ovarian syndrome     Sleep apnea 1/22/2014         Past Surgical History:   Procedure Laterality Date    CARDIAC SURGERY      cardia cath-no blockage    CHOLECYSTECTOMY  2008    EYE SURGERY  2010    detached retina    OTHER SURGICAL HISTORY  01/21/14    L4-5 BILATERAL LAMINECTOMY AND DISCECTOMY    OVARY SURGERY      multiple times    TONSILLECTOMY  1985    TYMPANOSTOMY TUBE PLACEMENT           Allergies   Allergen Reactions    Cefazolin Anaphylaxis    Adhesive Tape      Skin reddens, use paper tape    Cinnamon Rash         Current Outpatient Medications:     zinc 50 MG TABS tablet, Take 50 mg by mouth daily, Disp: , Rfl:     Copper Gluconate 2 MG TABS, Take by mouth daily, Disp: , Rfl:     clobetasol (TEMOVATE) 0.05 % ointment, Apply topically 2 times daily Apply topically 2 times daily. , Disp: , Rfl:     tacrolimus (PROTOPIC) 0.1 % ointment, Apply topically 2 times daily Apply topically 2 times daily. , Disp: , Rfl:     amLODIPine (NORVASC) 5 MG tablet, TAKE 1 TABLET BY MOUTH DAILY, Disp: 30 tablet, Rfl: 2    escitalopram (LEXAPRO) 20 MG tablet, TAKE 1 TABLET BY MOUTH EVERY DAY, Disp: 30 tablet, Rfl: 2    busPIRone (BUSPAR) 15 MG tablet, Take 15 mg by mouth in the morning and 15 mg in the evening., Disp: 60 tablet, Rfl: 2    cetirizine (ZYRTEC) 10 MG tablet, Take 10 mg by mouth daily, Disp: , Rfl:     lidocaine-prilocaine (EMLA) 2.5-2.5 % cream, , Disp: , Rfl:     fluticasone (FLONASE) 50 MCG/ACT nasal spray, 1 spray by Each Nostril route daily, Disp: 32 g, Rfl: 1    blood glucose test strips (ASCENSIA AUTODISC VI;ONE TOUCH ULTRA TEST VI) strip, 1 each by In Vitro route daily As needed. , Disp: 100 each, Rfl: 3    glucose monitoring (FREESTYLE FREEDOM) kit, 1 kit by Does not apply route daily, Disp: 1 kit, Rfl: 0    spironolactone (ALDACTONE) 100 MG tablet, Take 100 mg by mouth 2 times daily, Disp: , Rfl:     ondansetron (ZOFRAN ODT) 4 MG disintegrating tablet, Take 1 tablet by mouth every 8 hours as needed for Nausea, Disp: 20 tablet, Rfl: 0    benzoyl peroxide 5 % external liquid, , Disp: , Rfl:     clindamycin (CLEOCIN T) 1 % external solution, , Disp: , Rfl:     Lancets (ONETOUCH DELICA PLUS [id number]) MISC, Use to test blood glucose once daily, Disp: , Rfl:     metroNIDAZOLE (METROCREAM) 0.75 % cream, APPLY TO RASH ON FACE TWO TIMES A DAY, Disp: , Rfl:     mupirocin (BACTROBAN) 2 % ointment, APPLY TO OPEN SORES TWO TIMES A DAY, Disp: , Rfl:     nystatin-triamcinolone (MYCOLOG II) 857928-3.1 UNIT/GM-% cream, APPLY TOPICALLY THREE TIMES A DAY, Disp: , Rfl:     pantoprazole (PROTONIX) 40 MG tablet, Take 40 mg by mouth daily, Disp: , Rfl:     polyethylene glycol (GLYCOLAX) 17 GM/SCOOP powder, Take 17 g by mouth daily, Disp: , Rfl:     Semaglutide, 1 MG/DOSE, (OZEMPIC, 1 MG/DOSE,) 2 MG/1.5ML SOPN, Inject 1 mg into the skin every 7 days, Disp: , Rfl:     inFLIXimab (INFLECTRA) 100 MG SOLR, Infuse intravenously every 28 days, Disp: , Rfl:     linaclotide (LINZESS) 145 MCG capsule, Take 1 capsule by mouth 2 times daily (Patient taking differently: Take 145 mcg by mouth as needed), Disp: 60 capsule, Rfl: 1    Review of Systems:    Constitutional: Negative for fever, chills, and unexpected weight change. HENT: Negative for congestion, ear pain, rhinorrhea,  sore throat and trouble swallowing. Eyes: Negative for photophobia, redness, itching. Respiratory: Negative for cough, shortness of breath and sputum. Cardiovascular: Negative for chest pain, palpitations and leg swelling. Gastrointestinal: Negative for nausea, vomiting, abdominal pain, diarrhea, constipation. Endocrine: Negative for cold intolerance, heat intolerance, polydipsia, polyphagia and polyuria. Genitourinary: Negative for dysuria, urgency, frequency, hematuria and flank pain. Musculoskeletal: Negative for myalgias, back pain, arthralgias and neck pain. Skin/Nail: Negative for rash, itching. Normal nails. Neurological: Negative for seizures, weakness, light-headedness, numbness and headaches. Hematological/ Lymph nodes: Negative for adenopathy. Does not bruise/bleed easily. Psychiatric/Behavioral: Negative for suicidal ideas, depression, anxiety, sleep disturbance and decreased concentration. Objective:   Physical Exam:    /87 (Site: Left Upper Arm, Position: Sitting, Cuff Size: Large Adult)   Pulse 78   Ht 5' 4\" (1.626 m)   Wt 287 lb 3.2 oz (130.3 kg)   LMP 10/05/2022   SpO2 98%   BMI 49.30 kg/m²       Constitutional: Patient is oriented to person, place, and time. Patient appears well-developed and well-nourished. HENT:    Head: Normocephalic and atraumatic. Eyes: Conjunctivae and EOM are normal.      Neck: Normal range of motion. Thyroid normal.   Cardiovascular: Normal rate, regular rhythm and normal heart sounds. Pulmonary/Chest: Effort normal and breath sounds normal.    Musculoskeletal: Normal range of motion. Neurological: Patient is alert and oriented to person, place, and time.  Patient has normal reflexes. Skin: Skin is warm and dry. Clear to pink colored stretch marks. Psychiatric: Patient has a normal mood and affect. Patient behavior is normal.     Lab Review:      Office Visit on 11/30/2022   Component Date Value Ref Range Status    Hemoglobin A1C 11/30/2022 5.5  % Final   Abstract on 06/07/2022   Component Date Value Ref Range Status    Diabetic Retinopathy 06/03/2022 Negative   Final   Orders Only on 06/06/2022   Component Date Value Ref Range Status    Visual Acuity Distance Right Eye 06/06/2022 20/20   Final    Visual Acuity Distance Left Eye 06/06/2022 20/20   Final    Intraocular Pressure Eye 06/06/2022    Final                    Value:13  12      Diabetic Retinopathy 06/06/2022 NEGATIVE   Final    Cataracts 06/06/2022 NEGATIVE   Final    Glaucoma 06/06/2022 NEGATIVE   Final   Abstract on 05/02/2022   Component Date Value Ref Range Status    PAP Smear, External 07/08/2021 neg   Final           Assessment and Plan       Emerald Serrano was seen today for new patient and thyroid problem. Diagnoses and all orders for this visit:    PCOS (polycystic ovarian syndrome)  -     TSH; Future  -     T4, Free; Future  -     T3, Free; Future  -     Cortisol, urine, 24 hour; Future  -     Prolactin; Future  -     Follicle Stimulating Hormone; Future  -     Luteinizing Hormone; Future  -     Testosterone, free, total; Future  -     Insulin-Like Growth Factor; Future  -     Sex Hormone Binding Globulin; Future  -     DHEA-Sulfate; Future  -     Testosterone, free, total; Future  -     DHEA; Future  -     Androstenedione; Future  -     17-Hydroxyprogesterone; Future    Hirsutism  -     TSH; Future  -     T4, Free; Future  -     T3, Free; Future  -     Cortisol, urine, 24 hour; Future  -     Prolactin; Future  -     Follicle Stimulating Hormone; Future  -     Luteinizing Hormone; Future  -     Testosterone, free, total; Future  -     Insulin-Like Growth Factor; Future  -     Sex Hormone Binding Globulin;  Future  - DHEA-Sulfate; Future  -     Testosterone, free, total; Future  -     DHEA; Future  -     Androstenedione; Future  -     17-Hydroxyprogesterone; Future    Morbid obesity due to excess calories ([facility])  -     TSH; Future  -     T4, Free; Future  -     T3, Free; Future  -     Cortisol, urine, 24 hour; Future  -     Prolactin; Future  -     Follicle Stimulating Hormone; Future  -     Luteinizing Hormone; Future  -     Testosterone, free, total; Future  -     Insulin-Like Growth Factor; Future  -     Sex Hormone Binding Globulin; Future  -     DHEA-Sulfate; Future  -     Testosterone, free, total; Future  -     DHEA; Future  -     Androstenedione; Future  -     17-Hydroxyprogesterone; Future    Controlled type 2 diabetes mellitus without complication, without long-term current use of insulin ([facility])  -     TSH; Future  -     T4, Free; Future  -     T3, Free; Future  -     Cortisol, urine, 24 hour; Future  -     Prolactin; Future  -     Follicle Stimulating Hormone; Future  -     Luteinizing Hormone; Future  -     Testosterone, free, total; Future  -     Insulin-Like Growth Factor; Future  -     Sex Hormone Binding Globulin; Future  -     DHEA-Sulfate; Future  -     Testosterone, free, total; Future  -     DHEA; Future  -     Androstenedione; Future  -     17-Hydroxyprogesterone; Future      1: Hirsutism  likely due to PCOS     Weight loss will help     Use reliable contraception as she is on spironolactone (dermatologist)   She is taking spironolactone for Hidradenitis suppurativa     TT, SHBG,   TSH, FT4, Free T3, IGF-1,  Prolactin, LH, FSH, estradiol, DHEA, DHEAS, Androstenedione, saliva cortisol, AMH    OCPs will bring TT down (as per OBGYN) . GYN are not willing to prescribe OCPs due to age > 36.       2: Morbid obesity   Discussed weight loss options of exercise (150 minutes per week) plus diet plans   Diet plans may include intermittent fasting, low carb diet, weight watchers, mediterranean diet or caloric restriction. She does not to have weight loss surgery     3: DM controlled   As per pcp   On ozempic       Will do secondary work up of PCOS , if all negative , no follow up needed       Electronically signed by Red Marks MD on 2/21/2023 at 2:40 PM

## 2023-02-21 ENCOUNTER — OFFICE VISIT (OUTPATIENT)
Dept: ENDOCRINOLOGY | Age: 43
End: 2023-02-21
Payer: COMMERCIAL

## 2023-02-21 VITALS
HEIGHT: 64 IN | OXYGEN SATURATION: 98 % | WEIGHT: 287.2 LBS | HEART RATE: 78 BPM | SYSTOLIC BLOOD PRESSURE: 138 MMHG | DIASTOLIC BLOOD PRESSURE: 87 MMHG | BODY MASS INDEX: 49.03 KG/M2

## 2023-02-21 DIAGNOSIS — L68.0 HIRSUTISM: ICD-10-CM

## 2023-02-21 DIAGNOSIS — E66.01 MORBID OBESITY DUE TO EXCESS CALORIES (HCC): ICD-10-CM

## 2023-02-21 DIAGNOSIS — E28.2 PCOS (POLYCYSTIC OVARIAN SYNDROME): Primary | ICD-10-CM

## 2023-02-21 DIAGNOSIS — E11.9 CONTROLLED TYPE 2 DIABETES MELLITUS WITHOUT COMPLICATION, WITHOUT LONG-TERM CURRENT USE OF INSULIN (HCC): ICD-10-CM

## 2023-02-21 PROCEDURE — 3046F HEMOGLOBIN A1C LEVEL >9.0%: CPT | Performed by: INTERNAL MEDICINE

## 2023-02-21 PROCEDURE — 3075F SYST BP GE 130 - 139MM HG: CPT | Performed by: INTERNAL MEDICINE

## 2023-02-21 PROCEDURE — 3079F DIAST BP 80-89 MM HG: CPT | Performed by: INTERNAL MEDICINE

## 2023-02-21 PROCEDURE — 2022F DILAT RTA XM EVC RTNOPTHY: CPT | Performed by: INTERNAL MEDICINE

## 2023-02-21 PROCEDURE — 1036F TOBACCO NON-USER: CPT | Performed by: INTERNAL MEDICINE

## 2023-02-21 PROCEDURE — G8417 CALC BMI ABV UP PARAM F/U: HCPCS | Performed by: INTERNAL MEDICINE

## 2023-02-21 PROCEDURE — G8484 FLU IMMUNIZE NO ADMIN: HCPCS | Performed by: INTERNAL MEDICINE

## 2023-02-21 PROCEDURE — 99204 OFFICE O/P NEW MOD 45 MIN: CPT | Performed by: INTERNAL MEDICINE

## 2023-02-21 PROCEDURE — G8427 DOCREV CUR MEDS BY ELIG CLIN: HCPCS | Performed by: INTERNAL MEDICINE

## 2023-02-21 RX ORDER — ZINC GLUCONATE 50 MG
50 TABLET ORAL DAILY
COMMUNITY

## 2023-02-21 RX ORDER — CLOBETASOL PROPIONATE 0.5 MG/G
OINTMENT TOPICAL 2 TIMES DAILY
COMMUNITY

## 2023-02-21 RX ORDER — COPPER GLUCONATE 2 MG
TABLET ORAL DAILY
COMMUNITY

## 2023-02-21 RX ORDER — TACROLIMUS 1 MG/G
OINTMENT TOPICAL 2 TIMES DAILY
COMMUNITY

## 2023-02-23 DIAGNOSIS — E28.2 PCOS (POLYCYSTIC OVARIAN SYNDROME): ICD-10-CM

## 2023-02-23 DIAGNOSIS — E11.9 CONTROLLED TYPE 2 DIABETES MELLITUS WITHOUT COMPLICATION, WITHOUT LONG-TERM CURRENT USE OF INSULIN (HCC): ICD-10-CM

## 2023-02-23 DIAGNOSIS — E66.01 MORBID OBESITY DUE TO EXCESS CALORIES (HCC): ICD-10-CM

## 2023-02-23 DIAGNOSIS — L68.0 HIRSUTISM: ICD-10-CM

## 2023-02-23 LAB
HOURS COLLECTED: 24
URINE TOTAL VOLUME: 3050

## 2023-02-24 LAB
FOLLICLE STIMULATING HORMONE: 2.2 MIU/ML
LUTEINIZING HORMONE: 3.3 MIU/ML
PROLACTIN: 23.4 NG/ML
T3 FREE: 2.1 PG/ML (ref 2.3–4.2)
T4 FREE: 0.9 NG/DL (ref 0.9–1.8)
TSH SERPL DL<=0.05 MIU/L-ACNC: 1.11 UIU/ML (ref 0.27–4.2)

## 2023-02-25 LAB — SEX HORMONE BINDING GLOBULIN: 32 NMOL/L (ref 25–122)

## 2023-02-26 LAB
ANDROSTENEDIONE: 0.42 NG/ML (ref 0.13–0.82)
DHEA: 1.27 NG/ML (ref 0.63–4.7)
IGF-1 (INSULIN-LIKE GROWTH I): 111 NG/ML (ref 73–263)
INSULIN-LIKE GROWTH FACTOR-1 Z-SCORE: -0.9
SEX HORMONE BINDING GLOBULIN: 30 NMOL/L (ref 30–135)
TESTOSTERONE FREE-NONMALE: 1.1 PG/ML (ref 1.1–5.8)
TESTOSTERONE TOTAL: 6 NG/DL (ref 20–70)

## 2023-02-27 ENCOUNTER — OFFICE VISIT (OUTPATIENT)
Dept: PRIMARY CARE CLINIC | Age: 43
End: 2023-02-27
Payer: COMMERCIAL

## 2023-02-27 VITALS
TEMPERATURE: 97 F | WEIGHT: 287 LBS | HEART RATE: 103 BPM | BODY MASS INDEX: 49 KG/M2 | RESPIRATION RATE: 16 BRPM | DIASTOLIC BLOOD PRESSURE: 72 MMHG | OXYGEN SATURATION: 97 % | SYSTOLIC BLOOD PRESSURE: 112 MMHG | HEIGHT: 64 IN

## 2023-02-27 DIAGNOSIS — L73.2 HIDRADENITIS SUPPURATIVA: ICD-10-CM

## 2023-02-27 DIAGNOSIS — I10 ESSENTIAL HYPERTENSION: Primary | ICD-10-CM

## 2023-02-27 DIAGNOSIS — E28.2 PCOS (POLYCYSTIC OVARIAN SYNDROME): ICD-10-CM

## 2023-02-27 DIAGNOSIS — K58.1 IRRITABLE BOWEL SYNDROME WITH CONSTIPATION: ICD-10-CM

## 2023-02-27 DIAGNOSIS — F32.A ANXIETY AND DEPRESSION: ICD-10-CM

## 2023-02-27 DIAGNOSIS — F41.9 ANXIETY AND DEPRESSION: ICD-10-CM

## 2023-02-27 LAB — DHEAS (DHEA SULFATE): 57.9 UG/DL (ref 32–240)

## 2023-02-27 PROCEDURE — 1036F TOBACCO NON-USER: CPT | Performed by: FAMILY MEDICINE

## 2023-02-27 PROCEDURE — G8484 FLU IMMUNIZE NO ADMIN: HCPCS | Performed by: FAMILY MEDICINE

## 2023-02-27 PROCEDURE — G8427 DOCREV CUR MEDS BY ELIG CLIN: HCPCS | Performed by: FAMILY MEDICINE

## 2023-02-27 PROCEDURE — G8417 CALC BMI ABV UP PARAM F/U: HCPCS | Performed by: FAMILY MEDICINE

## 2023-02-27 PROCEDURE — 99214 OFFICE O/P EST MOD 30 MIN: CPT | Performed by: FAMILY MEDICINE

## 2023-02-27 PROCEDURE — 3078F DIAST BP <80 MM HG: CPT | Performed by: FAMILY MEDICINE

## 2023-02-27 PROCEDURE — 3074F SYST BP LT 130 MM HG: CPT | Performed by: FAMILY MEDICINE

## 2023-02-27 SDOH — ECONOMIC STABILITY: FOOD INSECURITY: WITHIN THE PAST 12 MONTHS, YOU WORRIED THAT YOUR FOOD WOULD RUN OUT BEFORE YOU GOT MONEY TO BUY MORE.: NEVER TRUE

## 2023-02-27 SDOH — ECONOMIC STABILITY: FOOD INSECURITY: WITHIN THE PAST 12 MONTHS, THE FOOD YOU BOUGHT JUST DIDN'T LAST AND YOU DIDN'T HAVE MONEY TO GET MORE.: NEVER TRUE

## 2023-02-27 SDOH — ECONOMIC STABILITY: HOUSING INSECURITY
IN THE LAST 12 MONTHS, WAS THERE A TIME WHEN YOU DID NOT HAVE A STEADY PLACE TO SLEEP OR SLEPT IN A SHELTER (INCLUDING NOW)?: NO

## 2023-02-27 SDOH — ECONOMIC STABILITY: INCOME INSECURITY: HOW HARD IS IT FOR YOU TO PAY FOR THE VERY BASICS LIKE FOOD, HOUSING, MEDICAL CARE, AND HEATING?: NOT HARD AT ALL

## 2023-02-27 ASSESSMENT — PATIENT HEALTH QUESTIONNAIRE - PHQ9
5. POOR APPETITE OR OVEREATING: 1
8. MOVING OR SPEAKING SO SLOWLY THAT OTHER PEOPLE COULD HAVE NOTICED. OR THE OPPOSITE, BEING SO FIGETY OR RESTLESS THAT YOU HAVE BEEN MOVING AROUND A LOT MORE THAN USUAL: 0
2. FEELING DOWN, DEPRESSED OR HOPELESS: 0
SUM OF ALL RESPONSES TO PHQ9 QUESTIONS 1 & 2: 2
SUM OF ALL RESPONSES TO PHQ QUESTIONS 1-9: 5
4. FEELING TIRED OR HAVING LITTLE ENERGY: 1
SUM OF ALL RESPONSES TO PHQ QUESTIONS 1-9: 5
3. TROUBLE FALLING OR STAYING ASLEEP: 1
9. THOUGHTS THAT YOU WOULD BE BETTER OFF DEAD, OR OF HURTING YOURSELF: 0
1. LITTLE INTEREST OR PLEASURE IN DOING THINGS: 2
SUM OF ALL RESPONSES TO PHQ QUESTIONS 1-9: 5
SUM OF ALL RESPONSES TO PHQ QUESTIONS 1-9: 5
7. TROUBLE CONCENTRATING ON THINGS, SUCH AS READING THE NEWSPAPER OR WATCHING TELEVISION: 0
6. FEELING BAD ABOUT YOURSELF - OR THAT YOU ARE A FAILURE OR HAVE LET YOURSELF OR YOUR FAMILY DOWN: 0
10. IF YOU CHECKED OFF ANY PROBLEMS, HOW DIFFICULT HAVE THESE PROBLEMS MADE IT FOR YOU TO DO YOUR WORK, TAKE CARE OF THINGS AT HOME, OR GET ALONG WITH OTHER PEOPLE: 1

## 2023-02-27 ASSESSMENT — ENCOUNTER SYMPTOMS
VOMITING: 0
DIARRHEA: 0
COUGH: 0
EYE ITCHING: 0
NAUSEA: 0
BLOOD IN STOOL: 0
SHORTNESS OF BREATH: 0
SORE THROAT: 0
ABDOMINAL PAIN: 1

## 2023-02-27 NOTE — PROGRESS NOTES
Chief Complaint   Patient presents with    Follow-up     Pt is here for a 3 month f/u          Guerita Donnelly is a 43 y.o. female who presents for routine visit. Pt has a hx of psoriatic arthritis and is followed by Rheumatology and is on IV inflectra medication with good control    Pt has a hx of Hidradenitis suppurativa since she was a teenager and is followed by [facility] and has had multiple surgeries (most recently 01/2020) along with an episode sepsis 2018. 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 Pt is with good control on IV inflectra medication and does take bursts of prednisone as needed recent flareups and also gets kenalog injections into lesions by Dermatology --- Patient has been scheduled for deroofing procedures in her R torso and L breast area to prevent worsening flareups     Patient has a past medical history significant for HTN and is on norvasc 5 mg daily     Pt is on miralax and protonix for irritable bowel and is followed by GI     Pt has a hx of sleep apnea and is on CPAP machine with good control     Pt is on lexapro and buspar dosage was increased with 75% improvement of her anxiety/depression.  Pt is followed by Weight Management and is on ozempic medication and Dermatologist also recommends this medication for her Hidradenitis suppurativa     Pt is followed by ENT for a thyroid nodule and recent thyroid US showed that it was stable      Pt has a hx of PCOS and endometriosis and is followed by OB/Gyne and is on mirena IUD but it was removed in 10/2022 -- Pt did see Endocrinology last week and had bloodwork and urine collection done which are pending     Pt is a nonsmoker and denies alcohol use; Pt has a certified medical marijuana card     FHx positive for early CAD (Father and PUncle) and thyroid cancer (sister)     Pt did complete her COVID vaccine series      Review of Systems   Constitutional:  Positive for chills (along with night sweats at times with flareups of her HS) and fatigue. Negative for fever. HENT:  Negative for nosebleeds and sore throat. Eyes:  Negative for itching. Negative blurred vision or diplopia   Respiratory:  Negative for cough and shortness of breath. Positive ROBERSON at times   Cardiovascular:  Positive for leg swelling (chronic and pt reports normal LE venous dopplers 2 years ago). Negative for chest pain and palpitations. Gastrointestinal:  Positive for abdominal pain (crampy at times (chronic)). Negative for blood in stool, diarrhea, nausea and vomiting. Negative melena or indigestion   Endocrine: Negative for polyuria. Genitourinary:  Negative for dysuria and hematuria. Musculoskeletal:  Positive for arthralgias. Skin:  Positive for rash (psoriatic at times and with flareups of her hidradenitis suppurativa). Neurological:  Positive for headaches. Negative for dizziness, seizures, syncope, speech difficulty and weakness. Psychiatric/Behavioral:  Positive for dysphoric mood (at times). The patient is nervous/anxious (at times). Vitals:    02/27/23 1126   BP: 112/72   Pulse: (!) 103   Resp: 16   Temp: 97 °F (36.1 °C)   SpO2: 97%         Physical Exam  Vitals reviewed. Constitutional:       General: She is not in acute distress. Appearance: She is obese. HENT:      Mouth/Throat:      Mouth: Mucous membranes are moist.      Pharynx: Oropharynx is clear. Eyes:      General: No scleral icterus. Comments: Pink conjunctivae   Neck:      Thyroid: No thyromegaly. Cardiovascular:      Heart sounds: Normal heart sounds. No murmur heard. No friction rub. No gallop. Pulmonary:      Effort: Pulmonary effort is normal.      Breath sounds: Normal breath sounds. Abdominal:      Palpations: Abdomen is soft. Tenderness: There is no abdominal tenderness. There is no guarding or rebound.    Musculoskeletal:      Comments: No leg edema noted B/L   Lymphadenopathy: Cervical: No cervical adenopathy. Skin:     Findings: No rash. Neurological:      Mental Status: She is alert. Comments: Cranial nerves 2 - 12 grossly intact; Muscle strength 5/5 throughout   Psychiatric:         Mood and Affect: Mood normal.       ASSESSMENT AND PLAN    1. Essential hypertension  Patient clinically stable on present medications and denies any CP or SOB    2. Anxiety and depression  Patient clinically stable on present medications    3. PCOS (polycystic ovarian syndrome)  Pt was recently seen by Endocrinology and had hormone level bloodwork and urine collection tests done which are pending    4. Hidradenitis suppurativa  Patient is being managed by Dermatology    5. Irritable bowel syndrome with constipation  Patient clinically stable on present medications and is with a nontender abdomen on PE      Bj Costa MD      Return in about 3 months (around 5/27/2023).

## 2023-03-01 LAB — 17-OH PROGESTERONE LCMS: 10.81 NG/DL

## 2023-04-25 RX ORDER — ESCITALOPRAM OXALATE 20 MG/1
TABLET ORAL
Qty: 30 TABLET | Refills: 3 | Status: SHIPPED | OUTPATIENT
Start: 2023-04-25

## 2023-05-10 ENCOUNTER — HOSPITAL ENCOUNTER (EMERGENCY)
Age: 43
Discharge: HOME | End: 2023-05-10
Attending: STUDENT IN AN ORGANIZED HEALTH CARE EDUCATION/TRAINING PROGRAM
Payer: COMMERCIAL

## 2023-05-10 ENCOUNTER — APPOINTMENT (OUTPATIENT)
Dept: GENERAL RADIOLOGY | Age: 43
End: 2023-05-10
Payer: COMMERCIAL

## 2023-05-10 VITALS
TEMPERATURE: 98.6 F | OXYGEN SATURATION: 100 % | HEART RATE: 96 BPM | RESPIRATION RATE: 20 BRPM | BODY MASS INDEX: 46.49 KG/M2 | WEIGHT: 272.3 LBS | HEIGHT: 64 IN | SYSTOLIC BLOOD PRESSURE: 114 MMHG | DIASTOLIC BLOOD PRESSURE: 77 MMHG

## 2023-05-10 DIAGNOSIS — U07.1 COVID-19: Primary | ICD-10-CM

## 2023-05-10 LAB
ANION GAP SERPL CALCULATED.3IONS-SCNC: 12 MMOL/L (ref 3–16)
BACTERIA URNS QL MICRO: ABNORMAL /HPF
BASOPHILS # BLD: 0 K/UL (ref 0–0.2)
BASOPHILS NFR BLD: 1 %
BILIRUB UR QL STRIP.AUTO: NEGATIVE
BUN SERPL-MCNC: 8 MG/DL (ref 7–20)
CALCIUM SERPL-MCNC: 9.4 MG/DL (ref 8.3–10.6)
CHLORIDE SERPL-SCNC: 101 MMOL/L (ref 99–110)
CLARITY UR: CLEAR
CO2 SERPL-SCNC: 23 MMOL/L (ref 21–32)
COLOR UR: YELLOW
CREAT SERPL-MCNC: 0.9 MG/DL (ref 0.6–1.1)
DEPRECATED RDW RBC AUTO: 18.5 % (ref 12.4–15.4)
EOSINOPHIL # BLD: 0.2 K/UL (ref 0–0.6)
EOSINOPHIL NFR BLD: 4.5 %
EPI CELLS #/AREA URNS HPF: ABNORMAL /HPF (ref 0–5)
GFR SERPLBLD CREATININE-BSD FMLA CKD-EPI: >60 ML/MIN/{1.73_M2}
GLUCOSE SERPL-MCNC: 124 MG/DL (ref 70–99)
GLUCOSE UR STRIP.AUTO-MCNC: NEGATIVE MG/DL
HCT VFR BLD AUTO: 40.1 % (ref 36–48)
HGB BLD-MCNC: 12.6 G/DL (ref 12–16)
HGB UR QL STRIP.AUTO: ABNORMAL
KETONES UR STRIP.AUTO-MCNC: NEGATIVE MG/DL
LEUKOCYTE ESTERASE UR QL STRIP.AUTO: NEGATIVE
LYMPHOCYTES # BLD: 1.3 K/UL (ref 1–5.1)
LYMPHOCYTES NFR BLD: 35.7 %
MCH RBC QN AUTO: 23.8 PG (ref 26–34)
MCHC RBC AUTO-ENTMCNC: 31.5 G/DL (ref 31–36)
MCV RBC AUTO: 75.4 FL (ref 80–100)
MONOCYTES # BLD: 0.6 K/UL (ref 0–1.3)
MONOCYTES NFR BLD: 15.2 %
NEUTROPHILS # BLD: 1.6 K/UL (ref 1.7–7.7)
NEUTROPHILS NFR BLD: 43.6 %
NITRITE UR QL STRIP.AUTO: NEGATIVE
PH UR STRIP.AUTO: 6.5 [PH] (ref 5–8)
PLATELET # BLD AUTO: 299 K/UL (ref 135–450)
PMV BLD AUTO: 8.7 FL (ref 5–10.5)
POTASSIUM SERPL-SCNC: 4.7 MMOL/L (ref 3.5–5.1)
PROT UR STRIP.AUTO-MCNC: NEGATIVE MG/DL
RBC # BLD AUTO: 5.32 M/UL (ref 4–5.2)
RBC #/AREA URNS HPF: ABNORMAL /HPF (ref 0–4)
SODIUM SERPL-SCNC: 136 MMOL/L (ref 136–145)
SP GR UR STRIP.AUTO: 1.01 (ref 1–1.03)
UA DIPSTICK W REFLEX MICRO PNL UR: YES
URN SPEC COLLECT METH UR: ABNORMAL
UROBILINOGEN UR STRIP-ACNC: 0.2 E.U./DL
WBC # BLD AUTO: 3.7 K/UL (ref 4–11)
WBC #/AREA URNS HPF: ABNORMAL /HPF (ref 0–5)

## 2023-05-10 PROCEDURE — 6360000002 HC RX W HCPCS: Performed by: PHYSICIAN ASSISTANT

## 2023-05-10 PROCEDURE — 99284 EMERGENCY DEPT VISIT MOD MDM: CPT

## 2023-05-10 PROCEDURE — 96361 HYDRATE IV INFUSION ADD-ON: CPT

## 2023-05-10 PROCEDURE — 81001 URINALYSIS AUTO W/SCOPE: CPT

## 2023-05-10 PROCEDURE — 80048 BASIC METABOLIC PNL TOTAL CA: CPT

## 2023-05-10 PROCEDURE — 2580000003 HC RX 258: Performed by: PHYSICIAN ASSISTANT

## 2023-05-10 PROCEDURE — 85025 COMPLETE CBC W/AUTO DIFF WBC: CPT

## 2023-05-10 PROCEDURE — 96374 THER/PROPH/DIAG INJ IV PUSH: CPT

## 2023-05-10 PROCEDURE — 71045 X-RAY EXAM CHEST 1 VIEW: CPT

## 2023-05-10 RX ORDER — ONDANSETRON 2 MG/ML
4 INJECTION INTRAMUSCULAR; INTRAVENOUS ONCE
Status: COMPLETED | OUTPATIENT
Start: 2023-05-10 | End: 2023-05-10

## 2023-05-10 RX ORDER — 0.9 % SODIUM CHLORIDE 0.9 %
1000 INTRAVENOUS SOLUTION INTRAVENOUS ONCE
Status: COMPLETED | OUTPATIENT
Start: 2023-05-10 | End: 2023-05-10

## 2023-05-10 RX ADMIN — ONDANSETRON 4 MG: 2 INJECTION INTRAMUSCULAR; INTRAVENOUS at 12:08

## 2023-05-10 RX ADMIN — SODIUM CHLORIDE 1000 ML: 9 INJECTION, SOLUTION INTRAVENOUS at 12:08

## 2023-05-10 ASSESSMENT — ENCOUNTER SYMPTOMS
COUGH: 1
TROUBLE SWALLOWING: 0
CONSTIPATION: 0
ABDOMINAL PAIN: 0
DIARRHEA: 0
SHORTNESS OF BREATH: 0
CHEST TIGHTNESS: 0
BACK PAIN: 0
SORE THROAT: 0
EYES NEGATIVE: 1
VOMITING: 1
NAUSEA: 1

## 2023-05-10 ASSESSMENT — LIFESTYLE VARIABLES
HOW MANY STANDARD DRINKS CONTAINING ALCOHOL DO YOU HAVE ON A TYPICAL DAY: PATIENT DOES NOT DRINK
HOW OFTEN DO YOU HAVE A DRINK CONTAINING ALCOHOL: NEVER

## 2023-05-10 NOTE — ED PROVIDER NOTES
ED Attending Attestation Note     Date of evaluation: 5/10/2023    This patient was seen by the advanced practice provider. I have seen and examined the patient, agree with the workup, evaluation, management and diagnosis. The care plan has been discussed. I have reviewed the ECG and concur with the MYESHA's interpretation. My assessment reveals a well appearing woman on infliximab who presnets with body aches, fever a few days ago, and 1 episode of vomiting today with associated nausea. Symptomatic since Monday, she also tested positive for COVID on Monday. She denies history of blood clots, focal leg pain or swelling, or any specific chest pain. On exam,       Vitals:    05/10/23 1115 05/10/23 1116   BP:  (!) 166/115   Pulse:  100   Resp:  19   Temp:  98.6 °F (37 °C)   TempSrc:  Oral   SpO2:  100%   Weight: 272 lb 4.8 oz (123.5 kg)    Height: 5' 4\" (1.626 m)        General:  Well appearing. No acute distress. Non-toxic appearing    Eyes:  Pupils equally round . No discharge from eyes. ENT:  No discharge from nose. OP clear. Neck:  Supple. Trachea midline. Pulmonary:   Non-labored breathing. Breath sounds clear bilaterally. Symmetric chest wall excursion  Cardiac:  Regular rhythm. Normal rate. No murmurs. Abdomen:  Soft. Non-tender. Non-distended. No masses. Obese. Musculoskeletal:  No long bone deformity. No ankle or wrist deformity. Vascular:  Extremities warm and perfused. Skin:  No rash. Warm. Neuro: Alert and conversant. CN II-XII grossly intact. Speech and mentation normal.    FILOMENA  Sensation grossly intact to light touch. Extremities:  No peripheral edema. LE symmetric. Plan for labs, CXR.      Erum Peng MD  05/10/23 2995
Lymphocytes % 35.7 %    Monocytes % 15.2 %    Eosinophils % 4.5 %    Basophils % 1.0 %    Neutrophils Absolute 1.6 (L) 1.7 - 7.7 K/uL    Lymphocytes Absolute 1.3 1.0 - 5.1 K/uL    Monocytes Absolute 0.6 0.0 - 1.3 K/uL    Eosinophils Absolute 0.2 0.0 - 0.6 K/uL    Basophils Absolute 0.0 0.0 - 0.2 K/uL   Urinalysis with Microscopic   Result Value Ref Range    Color, UA Yellow Straw/Yellow    Clarity, UA Clear Clear    Glucose, Ur Negative Negative mg/dL    Bilirubin Urine Negative Negative    Ketones, Urine Negative Negative mg/dL    Specific Gravity, UA 1.010 1.005 - 1.030    Blood, Urine TRACE-LYSED (A) Negative    pH, UA 6.5 5.0 - 8.0    Protein, UA Negative Negative mg/dL    Urobilinogen, Urine 0.2 <2.0 E.U./dL    Nitrite, Urine Negative Negative    Leukocyte Esterase, Urine Negative Negative    Microscopic Examination YES     Urine Type NotGiven     WBC, UA None seen 0 - 5 /HPF    RBC, UA None seen 0 - 4 /HPF    Epithelial Cells, UA 2-5 0 - 5 /HPF    Bacteria, UA Rare (A) None Seen /HPF   Basic Metabolic Panel w/ Reflex to MG   Result Value Ref Range    Sodium 136 136 - 145 mmol/L    Potassium reflex Magnesium 4.7 3.5 - 5.1 mmol/L    Chloride 101 99 - 110 mmol/L    CO2 23 21 - 32 mmol/L    Anion Gap 12 3 - 16    Glucose 124 (H) 70 - 99 mg/dL    BUN 8 7 - 20 mg/dL    Creatinine 0.9 0.6 - 1.1 mg/dL    Est, Glom Filt Rate >60 >60    Calcium 9.4 8.3 - 10.6 mg/dL         ED BEDSIDE ULTRASOUND:  No results found. RECENT VITALS:  BP: 114/77, Temp: 98.6 °F (37 °C), Pulse: 96, Respirations: 20, SpO2: 100 %     Procedures         ED Course     Nursing Notes, Past Medical Hx,Past Surgical Hx, Social Hx, Allergies, and Family Hx were reviewed.          The patient was given the following medications:  Orders Placed This Encounter   Medications    0.9 % sodium chloride bolus    ondansetron (ZOFRAN) injection 4 mg       CONSULTS:  None    Review of Systems     Review of Systems   Constitutional:  Positive for chills and

## 2023-05-10 NOTE — ED NOTES
Discharge instructions reviewed with Pt. Pt verbalizes understanding at this time. medications reviewed with pt at this time. VS as noted. Pt condition stable at this time. No concerns voiced.        Josselyn Prieto RN  05/10/23 3273

## 2023-05-10 NOTE — ED NOTES
Pt ambulated distance of approx 100 feet. During ambulation SPO2 maintained above 97% for duration of ambulation. Pts HR elevated during ambulation to rate of 110. Upon returning to bed. Pts SPO2 improved to 100%. HR decreased to 82. Will cont to monitor.       Norma Yepez RN  05/10/23 100 [facility], CAT  05/10/23 3705

## 2023-05-10 NOTE — DISCHARGE INSTRUCTIONS
-tylenol as needed for pain and fevers  -drink fluids to stay well hydrated  -zofran as needed for nausea  -rest  -follow up with PCP for concerns  -return for worsening symptoms as fevers of 100.5 or greater that are not relieved with Tylenol or ibuprofen, uncontrolled vomiting, worsening shortness of breath or other concerns

## 2023-05-10 NOTE — ED TRIAGE NOTES
Patient states that mother had a stroke on 4/3023, resulting in hospitalization and gela of COVID. Patient became symptomatic on Monday, has been trying to fight it off but feeling terrible. Short of breath, cough, fever, nausea. Gets infusions every 4 weeks for treatment of HD, causes immuno-compromise.

## 2023-05-22 ENCOUNTER — OFFICE VISIT (OUTPATIENT)
Dept: PRIMARY CARE CLINIC | Age: 43
End: 2023-05-22
Payer: COMMERCIAL

## 2023-05-22 VITALS
DIASTOLIC BLOOD PRESSURE: 80 MMHG | TEMPERATURE: 97 F | BODY MASS INDEX: 47.46 KG/M2 | HEIGHT: 64 IN | WEIGHT: 278 LBS | RESPIRATION RATE: 16 BRPM | HEART RATE: 90 BPM | OXYGEN SATURATION: 99 % | SYSTOLIC BLOOD PRESSURE: 100 MMHG

## 2023-05-22 DIAGNOSIS — L73.2 HIDRADENITIS SUPPURATIVA: ICD-10-CM

## 2023-05-22 DIAGNOSIS — U07.1 COVID-19 VIRUS INFECTION: Primary | ICD-10-CM

## 2023-05-22 PROCEDURE — G8427 DOCREV CUR MEDS BY ELIG CLIN: HCPCS | Performed by: FAMILY MEDICINE

## 2023-05-22 PROCEDURE — 99213 OFFICE O/P EST LOW 20 MIN: CPT | Performed by: FAMILY MEDICINE

## 2023-05-22 PROCEDURE — 3078F DIAST BP <80 MM HG: CPT | Performed by: FAMILY MEDICINE

## 2023-05-22 PROCEDURE — 1036F TOBACCO NON-USER: CPT | Performed by: FAMILY MEDICINE

## 2023-05-22 PROCEDURE — G8417 CALC BMI ABV UP PARAM F/U: HCPCS | Performed by: FAMILY MEDICINE

## 2023-05-22 PROCEDURE — 3074F SYST BP LT 130 MM HG: CPT | Performed by: FAMILY MEDICINE

## 2023-05-22 ASSESSMENT — ENCOUNTER SYMPTOMS
NAUSEA: 0
COUGH: 1
ABDOMINAL PAIN: 0
VOMITING: 0
SORE THROAT: 0
SHORTNESS OF BREATH: 0

## 2023-05-23 DIAGNOSIS — I10 ESSENTIAL HYPERTENSION: ICD-10-CM

## 2023-05-23 RX ORDER — AMLODIPINE BESYLATE 5 MG/1
5 TABLET ORAL DAILY
Qty: 30 TABLET | Refills: 3 | OUTPATIENT
Start: 2023-05-23

## 2023-05-23 RX ORDER — AMLODIPINE BESYLATE 5 MG/1
5 TABLET ORAL DAILY
Qty: 30 TABLET | Refills: 3 | Status: SHIPPED | OUTPATIENT
Start: 2023-05-23

## 2023-07-07 ENCOUNTER — OFFICE VISIT (OUTPATIENT)
Dept: ENDOCRINOLOGY | Age: 43
End: 2023-07-07
Payer: COMMERCIAL

## 2023-07-07 VITALS
OXYGEN SATURATION: 97 % | SYSTOLIC BLOOD PRESSURE: 128 MMHG | HEIGHT: 64 IN | BODY MASS INDEX: 48.14 KG/M2 | WEIGHT: 282 LBS | HEART RATE: 78 BPM | DIASTOLIC BLOOD PRESSURE: 82 MMHG

## 2023-07-07 DIAGNOSIS — L68.0 HIRSUTISM: ICD-10-CM

## 2023-07-07 DIAGNOSIS — E28.2 PCOS (POLYCYSTIC OVARIAN SYNDROME): ICD-10-CM

## 2023-07-07 DIAGNOSIS — E66.01 MORBID OBESITY DUE TO EXCESS CALORIES (HCC): ICD-10-CM

## 2023-07-07 DIAGNOSIS — R79.89 ABNORMAL THYROID BLOOD TEST: ICD-10-CM

## 2023-07-07 DIAGNOSIS — E28.2 PCOS (POLYCYSTIC OVARIAN SYNDROME): Primary | ICD-10-CM

## 2023-07-07 LAB
PROLACTIN SERPL IA-MCNC: 31.8 NG/ML
T3 SERPL-MCNC: 1.21 NG/ML (ref 0.8–2)
T3FREE SERPL-MCNC: 2.8 PG/ML (ref 2.3–4.2)
T4 FREE SERPL-MCNC: 0.9 NG/DL (ref 0.9–1.8)
TSH SERPL DL<=0.005 MIU/L-ACNC: 2.43 UIU/ML (ref 0.27–4.2)

## 2023-07-07 PROCEDURE — 3074F SYST BP LT 130 MM HG: CPT | Performed by: INTERNAL MEDICINE

## 2023-07-07 PROCEDURE — 3079F DIAST BP 80-89 MM HG: CPT | Performed by: INTERNAL MEDICINE

## 2023-07-07 PROCEDURE — 99214 OFFICE O/P EST MOD 30 MIN: CPT | Performed by: INTERNAL MEDICINE

## 2023-07-07 NOTE — PROGRESS NOTES
Absolute 05/10/2023 0.2  0.0 - 0.6 K/uL Final    Basophils Absolute 05/10/2023 0.0  0.0 - 0.2 K/uL Final    Color, UA 05/10/2023 Yellow  Straw/Yellow Final    Clarity, UA 05/10/2023 Clear  Clear Final    Glucose, Ur 05/10/2023 Negative  Negative mg/dL Final    Bilirubin Urine 05/10/2023 Negative  Negative Final    Ketones, Urine 05/10/2023 Negative  Negative mg/dL Final    Specific Gravity, UA 05/10/2023 1.010  1.005 - 1.030 Final    Blood, Urine 05/10/2023 TRACE-LYSED (A)  Negative Final    pH, UA 05/10/2023 6.5  5.0 - 8.0 Final    Protein, UA 05/10/2023 Negative  Negative mg/dL Final    Urobilinogen, Urine 05/10/2023 0.2  <2.0 E.U./dL Final    Nitrite, Urine 05/10/2023 Negative  Negative Final    Leukocyte Esterase, Urine 05/10/2023 Negative  Negative Final    Microscopic Examination 05/10/2023 YES   Final    Urine Type 05/10/2023 NotGiven   Final    WBC, UA 05/10/2023 None seen  0 - 5 /HPF Final    RBC, UA 05/10/2023 None seen  0 - 4 /HPF Final    Epithelial Cells, UA 05/10/2023 2-5  0 - 5 /HPF Final    Bacteria, UA 05/10/2023 Rare (A)  None Seen /HPF Final    Sodium 05/10/2023 136  136 - 145 mmol/L Final    Potassium reflex Magnesium 05/10/2023 4.7  3.5 - 5.1 mmol/L Final    Chloride 05/10/2023 101  99 - 110 mmol/L Final    CO2 05/10/2023 23  21 - 32 mmol/L Final    Anion Gap 05/10/2023 12  3 - 16 Final    Glucose 05/10/2023 124 (H)  70 - 99 mg/dL Final    BUN 05/10/2023 8  7 - 20 mg/dL Final    Creatinine 05/10/2023 0.9  0.6 - 1.1 mg/dL Final    Est, Glom Filt Rate 05/10/2023 >60  >60 Final    Calcium 05/10/2023 9.4  8.3 - 10.6 mg/dL Final   Orders Only on 02/23/2023   Component Date Value Ref Range Status    Cortisol,F,ug/L,U 02/23/2023 2.30  ug/L Final    Cortisol,Ur Free 02/23/2023 6.05  ug/g CRT Final    Urine Total Volume 02/23/2023 3050   Final    Hours Collected 02/23/2023 24   Final    Cortisol (Ur), Free 02/23/2023 7.0  <=45.0 ug/d Final    Creatinine, Ur 02/23/2023 38  mg/dL Final    Creatinine, 24H

## 2023-07-11 DIAGNOSIS — E22.1 HYPERPROLACTINEMIA (HCC): Primary | ICD-10-CM

## 2023-07-11 LAB — MISCELLANEOUS LAB TEST ORDER: NORMAL

## 2023-07-12 DIAGNOSIS — F40.240 CLAUSTROPHOBIA: Primary | ICD-10-CM

## 2023-07-12 RX ORDER — ALPRAZOLAM 0.25 MG/1
TABLET ORAL
Qty: 2 TABLET | Refills: 0 | Status: SHIPPED | OUTPATIENT
Start: 2023-07-12 | End: 2023-07-28

## 2023-07-17 ENCOUNTER — HOSPITAL ENCOUNTER (OUTPATIENT)
Dept: MRI IMAGING | Age: 43
Discharge: HOME | End: 2023-07-17
Attending: INTERNAL MEDICINE
Payer: COMMERCIAL

## 2023-07-17 ENCOUNTER — TELEPHONE (OUTPATIENT)
Dept: PRIMARY CARE CLINIC | Age: 43
End: 2023-07-17

## 2023-07-17 DIAGNOSIS — E23.7 PITUITARY LESION (HCC): Primary | ICD-10-CM

## 2023-07-17 DIAGNOSIS — D33.2 BENIGN NEOPLASM OF BRAIN, UNSPECIFIED BRAIN REGION (HCC): Primary | ICD-10-CM

## 2023-07-17 DIAGNOSIS — E22.1 HYPERPROLACTINEMIA (HCC): ICD-10-CM

## 2023-07-17 PROCEDURE — 6360000004 HC RX CONTRAST MEDICATION: Performed by: INTERNAL MEDICINE

## 2023-07-17 PROCEDURE — 70553 MRI BRAIN STEM W/O & W/DYE: CPT | Performed by: INTERNAL MEDICINE

## 2023-07-17 PROCEDURE — A9579 GAD-BASE MR CONTRAST NOS,1ML: HCPCS | Performed by: INTERNAL MEDICINE

## 2023-07-17 RX ADMIN — GADOTERIDOL 20 ML: 279.3 INJECTION, SOLUTION INTRAVENOUS at 09:15

## 2023-07-17 NOTE — TELEPHONE ENCOUNTER
[facility] called in about a referral for the patient. [facility] stated they had a referral from the patients endocrinologist but with insurance they would like a referral from the patients primary care also.      Patient had an Mri today and has an Epidermal lesion

## 2023-07-18 NOTE — TELEPHONE ENCOUNTER
Referral entered and faxed to [address] [place] [address]. No further action is required for this encounter.

## 2023-07-26 LAB — DIABETIC RETINOPATHY: POSITIVE

## 2023-08-22 ENCOUNTER — OFFICE VISIT (OUTPATIENT)
Dept: PRIMARY CARE CLINIC | Age: 43
End: 2023-08-22
Payer: COMMERCIAL

## 2023-08-22 VITALS
HEART RATE: 97 BPM | TEMPERATURE: 96.6 F | SYSTOLIC BLOOD PRESSURE: 120 MMHG | HEIGHT: 64 IN | WEIGHT: 288 LBS | DIASTOLIC BLOOD PRESSURE: 88 MMHG | OXYGEN SATURATION: 97 % | RESPIRATION RATE: 16 BRPM | BODY MASS INDEX: 49.17 KG/M2

## 2023-08-22 DIAGNOSIS — N80.9 ENDOMETRIOSIS: ICD-10-CM

## 2023-08-22 DIAGNOSIS — I10 ESSENTIAL HYPERTENSION: Primary | ICD-10-CM

## 2023-08-22 DIAGNOSIS — E66.01 MORBID OBESITY (HCC): ICD-10-CM

## 2023-08-22 DIAGNOSIS — F41.9 ANXIETY AND DEPRESSION: ICD-10-CM

## 2023-08-22 DIAGNOSIS — E28.2 PCOS (POLYCYSTIC OVARIAN SYNDROME): ICD-10-CM

## 2023-08-22 DIAGNOSIS — G47.30 SLEEP APNEA, UNSPECIFIED TYPE: ICD-10-CM

## 2023-08-22 DIAGNOSIS — L40.50 PSORIATIC ARTHRITIS (HCC): ICD-10-CM

## 2023-08-22 DIAGNOSIS — F32.A ANXIETY AND DEPRESSION: ICD-10-CM

## 2023-08-22 DIAGNOSIS — L73.2 HIDRADENITIS SUPPURATIVA: ICD-10-CM

## 2023-08-22 PROCEDURE — 3078F DIAST BP <80 MM HG: CPT | Performed by: FAMILY MEDICINE

## 2023-08-22 PROCEDURE — 99214 OFFICE O/P EST MOD 30 MIN: CPT | Performed by: FAMILY MEDICINE

## 2023-08-22 PROCEDURE — 3074F SYST BP LT 130 MM HG: CPT | Performed by: FAMILY MEDICINE

## 2023-08-22 ASSESSMENT — ENCOUNTER SYMPTOMS
VOMITING: 0
ABDOMINAL PAIN: 0
NAUSEA: 0
DIARRHEA: 0
COUGH: 0
BLOOD IN STOOL: 0
SORE THROAT: 0
SHORTNESS OF BREATH: 0

## 2023-08-22 NOTE — PROGRESS NOTES
Chief Complaint   Patient presents with    Other     Covid Virus Infection- 3 month f/u          Cresencio Duenas is a 43 y.o. female who presents for routine visit. Pt's mother recently had a stroke       Pt has a hx of psoriatic arthritis and is followed by Rheumatology and is on IV inflectra medication with good control     Pt has a hx of Hidradenitis suppurativa since she was a teenager and is followed by [facility] [place] and has had multiple surgeries (most recently 01/2020) along with an episode sepsis 2018. 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 Pt is with good control on IV inflectra medication and does take bursts of prednisone as needed recent flareups and also gets kenalog injections into lesions by Dermatology --- Patient did complete clifton procedures in her R torso and L breast area to prevent worsening flareups     Patient has a past medical history significant for HTN and is on norvasc 5 mg daily     Pt is on miralax and protonix for irritable bowel and is followed by GI      Pt has a hx of sleep apnea and is on CPAP machine with good control     Pt is on lexapro and buspar dosage was increased with 75% improvement of her anxiety/depression. Pt is no longer taking ozempic medication for weight loss     Pt is followed by ENT for a thyroid nodule and recent thyroid US showed that it was stable      Pt has a hx of PCOS and endometriosis and is followed by OB/Gyne and was on mirena IUD but it was removed in 10/2022      Pt is a nonsmoker and denies alcohol use; Pt has a certified medical marijuana card     FHx positive for early CAD (Father and PUncle) and thyroid cancer (sister)     Pt did complete her COVID vaccine series       MRI pituitary 07/17/2023  IMPRESSION:     1. Posterior fossa midline epidermoid lesion measures 1.6 cm x 3.0 cm x 2.1 cm.     2. No evidence of pituitary adenoma or other pituitary mass lesion.      3. Scattered foci of T2

## 2023-09-21 PROBLEM — E11.9 CONTROLLED TYPE 2 DIABETES MELLITUS WITHOUT COMPLICATION, WITHOUT LONG-TERM CURRENT USE OF INSULIN (HCC): Chronic | Status: ACTIVE | Noted: 2023-02-21

## 2023-09-28 DIAGNOSIS — F41.9 ANXIETY AND DEPRESSION: ICD-10-CM

## 2023-09-28 DIAGNOSIS — F32.A ANXIETY AND DEPRESSION: ICD-10-CM

## 2023-09-28 RX ORDER — BUSPIRONE HYDROCHLORIDE 15 MG/1
TABLET ORAL
Qty: 60 TABLET | Refills: 3 | Status: SHIPPED | OUTPATIENT
Start: 2023-09-28

## 2023-09-28 RX ORDER — ESCITALOPRAM OXALATE 20 MG/1
TABLET ORAL
Qty: 30 TABLET | Refills: 3 | Status: SHIPPED | OUTPATIENT
Start: 2023-09-28

## 2023-09-28 NOTE — TELEPHONE ENCOUNTER
Medication:   Requested Prescriptions     Pending Prescriptions Disp Refills    escitalopram (LEXAPRO) 20 MG tablet [Pharmacy Med Name: ESCITALOPRAM 20MG TABLETS] 30 tablet 3     Sig: TAKE 1 TABLET BY MOUTH EVERY DAY    busPIRone (BUSPAR) 15 MG tablet [Pharmacy Med Name: BUSPIRONE 15MG TABLETS] 60 tablet 3     Sig: TAKE 1 TABLET BY MOUTH IN THE MORNING AND IN THE EVENING        Last Filled: Lexapro-04/25/2023 #30 with 3 refills   Buspirone- 04/14/2023 #60 with 3 refills     Patient Phone Number: [phone] (home)     Last appt: 8/22/2023   Next appt: 11/13/2023    Last OARRS:        No data to display

## 2023-10-05 ENCOUNTER — OFFICE VISIT (OUTPATIENT)
Dept: ENDOCRINOLOGY | Age: 43
End: 2023-10-05
Payer: COMMERCIAL

## 2023-10-05 VITALS
DIASTOLIC BLOOD PRESSURE: 90 MMHG | SYSTOLIC BLOOD PRESSURE: 153 MMHG | HEART RATE: 78 BPM | OXYGEN SATURATION: 97 % | WEIGHT: 286 LBS | BODY MASS INDEX: 48.83 KG/M2 | HEIGHT: 64 IN

## 2023-10-05 DIAGNOSIS — E66.01 MORBID OBESITY DUE TO EXCESS CALORIES (HCC): ICD-10-CM

## 2023-10-05 DIAGNOSIS — L68.0 HIRSUTISM: ICD-10-CM

## 2023-10-05 DIAGNOSIS — E28.2 PCOS (POLYCYSTIC OVARIAN SYNDROME): Primary | ICD-10-CM

## 2023-10-05 DIAGNOSIS — E28.2 PCOS (POLYCYSTIC OVARIAN SYNDROME): ICD-10-CM

## 2023-10-05 LAB — PROLACTIN SERPL IA-MCNC: 11.9 NG/ML

## 2023-10-05 PROCEDURE — 99214 OFFICE O/P EST MOD 30 MIN: CPT | Performed by: INTERNAL MEDICINE

## 2023-10-05 NOTE — PROGRESS NOTES
Patient ID:   Jonny Patrick is a 43 y.o. female    Chief Complaint:   Jonny Patrick is seen for an evaluation of PCOS, abnormal thyroid numbers, high prolactin. Subjective:   Jonny Patrick is sent here for three multiple medical problems. First two are outside endocrine specialty. Will discuss PCOS. PCOS diagnosed at age 13. Had fertility issues. Had endometrioses. Had one miscarriage. No kids. Had mirena in Oct 2022. No cycles since Oct 2022. Family history of PCOS or excessive hair growth: Mother has PCOS    Denies exposure to testosterone or OTC compounded creams     Her mother had a stroke in April 2023. She contracted COVID in the hospital.   Interval history: On spironolactone 100 mg bid ( for Hidradenitis suppurativa)  Not sexually active. Not on birth control pills due to age. She has breast tenderness , no discharge     She has seen some improvement on hair growth on face, neck, chin. Not a lot of hair loss. Off IUD , no cycles since 2022     Diet: eating less than 1500 calories. Saw weight loss physician with [facility]. Taking ozempic 1 mg once a week on Mondays. Now managed bu pcp.    Exercise : limited due to hidradenitis suppurativa      Type 2 DM diagnosed in 2018   off Ozempic , caused stomach issues   As per pcp     Thyroid nodules   Seeing Dr. Asher Layton   Sister had hurthle cell carcinoma with mets to lungs and follows with [facility]     The following portions of the patient's history were reviewed and updated as appropriate:      Family History   Problem Relation Age of Onset    Kidney Disease Mother         CKD    Hypertension Mother     Diabetes Mother     High Blood Pressure Father     COPD Father     Emphysema Father     Elevated Lipids Sister         hypertriglyceridemia    Thyroid Cancer Sister     Asthma Brother     Sleep Apnea Brother     Depression Brother     Anxiety Disorder Brother     Other Maternal Grandfather         murdered    Stroke Paternal Grandmother

## 2023-10-07 DIAGNOSIS — I10 ESSENTIAL HYPERTENSION: ICD-10-CM

## 2023-10-09 NOTE — TELEPHONE ENCOUNTER
Medication:   Requested Prescriptions     Pending Prescriptions Disp Refills    amLODIPine (NORVASC) 5 MG tablet [Pharmacy Med Name: AMLODIPINE BESYLATE 5MG TABLETS] 30 tablet 3     Sig: TAKE 1 TABLET BY MOUTH DAILY        Last Filled: 05/23/2023 #30 with 3 refills     Patient Phone Number: [phone] (home)     Last appt: 8/22/2023   Next appt: 11/13/2023    Last OARRS:        No data to display

## 2023-10-10 LAB
SHBG SERPL-SCNC: 29 NMOL/L (ref 30–135)
TESTOST FREE SERPL-MCNC: 3.8 PG/ML (ref 1.1–5.8)
TESTOST SERPL-MCNC: 20 NG/DL (ref 20–70)

## 2023-10-10 RX ORDER — AMLODIPINE BESYLATE 5 MG/1
5 TABLET ORAL DAILY
Qty: 30 TABLET | Refills: 3 | Status: SHIPPED | OUTPATIENT
Start: 2023-10-10

## 2023-10-12 ENCOUNTER — PATIENT MESSAGE (OUTPATIENT)
Dept: PRIMARY CARE CLINIC | Age: 43
End: 2023-10-12

## 2023-10-12 NOTE — TELEPHONE ENCOUNTER
Pt has been scheduled for an appointment with NP on 10/13/2023.      No further action is needed for this encounter

## 2023-10-12 NOTE — TELEPHONE ENCOUNTER
From: Jessica Nichols  To: Dr. Litzy Tyson: 10/12/2023 2:51 PM EDT  Subject: Pulled muscle    Hello,  I think I pulled a muscle-hamstring or sciatica. It's been gaining on since Monday. I've been soaking in epsom salt, iced, heating pad, stretches, and pain relievers. So far very little relief. It hurt from left hip thigh area and goes to heel. It's stopping me from being able to lay down to sleep and can't stand up straight to walk. No injury I remember. Should I be seen?      Thanks  [employer]

## 2023-10-13 ENCOUNTER — OFFICE VISIT (OUTPATIENT)
Dept: PRIMARY CARE CLINIC | Age: 43
End: 2023-10-13
Payer: COMMERCIAL

## 2023-10-13 VITALS
TEMPERATURE: 98.1 F | RESPIRATION RATE: 16 BRPM | HEIGHT: 64 IN | WEIGHT: 291 LBS | OXYGEN SATURATION: 97 % | SYSTOLIC BLOOD PRESSURE: 124 MMHG | HEART RATE: 92 BPM | BODY MASS INDEX: 49.68 KG/M2 | DIASTOLIC BLOOD PRESSURE: 88 MMHG

## 2023-10-13 DIAGNOSIS — M51.16 LUMBAR DISC DISEASE WITH RADICULOPATHY: Primary | ICD-10-CM

## 2023-10-13 PROCEDURE — 3079F DIAST BP 80-89 MM HG: CPT | Performed by: NURSE PRACTITIONER

## 2023-10-13 PROCEDURE — 3074F SYST BP LT 130 MM HG: CPT | Performed by: NURSE PRACTITIONER

## 2023-10-13 PROCEDURE — 99213 OFFICE O/P EST LOW 20 MIN: CPT | Performed by: NURSE PRACTITIONER

## 2023-10-13 RX ORDER — CYCLOBENZAPRINE HCL 5 MG
5 TABLET ORAL 2 TIMES DAILY PRN
Qty: 20 TABLET | Refills: 0 | Status: SHIPPED | OUTPATIENT
Start: 2023-10-13 | End: 2023-10-23

## 2023-10-13 RX ORDER — PREDNISONE 20 MG/1
40 TABLET ORAL DAILY
Qty: 10 TABLET | Refills: 0 | Status: SHIPPED | OUTPATIENT
Start: 2023-10-13 | End: 2023-10-18

## 2023-10-13 RX ORDER — MICONAZOLE NITRATE 20 MG/G
POWDER TOPICAL
COMMUNITY
Start: 2023-10-04

## 2023-10-13 ASSESSMENT — ENCOUNTER SYMPTOMS
CONSTIPATION: 0
DIARRHEA: 0
VOMITING: 0
BACK PAIN: 1
NAUSEA: 0

## 2023-10-13 NOTE — ASSESSMENT & PLAN NOTE
Begin steroid and muscle relaxant  No etoh with muscle relasxan t or driving  IF you experience loss of function sensation movemtn in leg seek er help   Return to office if symptoms do not resolve with medical management for next steps

## 2023-10-13 NOTE — PROGRESS NOTES
PROGRESS NOTE  Date of Service:  10/13/2023    SUBJECTIVE:  Patient ID: Fuentes Ruano is a 43 y.o. female    HPI: new patient exam  No known injury  Monday began with pain under left butt down back to left knee   Tuesday pain with walking  Soaked in epsom salt tried ice stretches ibuprofen   Has ddd had diskectomy 8-9 years ago with flair up one year ago had accupuncture for this this pain does resemble that pain.    Has tingling in left foot feels asleep  No loss of sensation   No change in bowel or bladder function   Has not fallen since pain began       Past Medical History:   Diagnosis Date    Endometriosis     Hypertension     Lumbar disc disorder     Nausea & vomiting     Polycystic ovarian syndrome     Sleep apnea 1/22/2014      Past Surgical History:   Procedure Laterality Date    CARDIAC SURGERY      cardia cath-no blockage    CHOLECYSTECTOMY  2008    EYE SURGERY  2010    detached retina    OTHER SURGICAL HISTORY  01/21/14    L4-5 BILATERAL LAMINECTOMY AND DISCECTOMY    OVARY SURGERY      multiple times    TONSILLECTOMY  1985    TYMPANOSTOMY TUBE PLACEMENT        Social History     Tobacco Use    Smoking status: Never    Smokeless tobacco: Never   Substance Use Topics    Alcohol use: No      Family History   Problem Relation Age of Onset    Kidney Disease Mother         CKD    Hypertension Mother     Diabetes Mother     High Blood Pressure Father     COPD Father     Emphysema Father     Elevated Lipids Sister         hypertriglyceridemia    Thyroid Cancer Sister     Asthma Brother     Sleep Apnea Brother     Depression Brother     Anxiety Disorder Brother     Other Maternal Grandfather         murdered    Stroke Paternal Grandmother     Heart Disease Paternal Grandfather      Current Outpatient Medications on File Prior to Visit   Medication Sig Dispense Refill    ZEASORB-AF 2 % powder APPLY TOPICALLY AS NEEDED FOR ITCHING      amLODIPine (NORVASC) 5 MG tablet TAKE 1 TABLET BY MOUTH DAILY 30 tablet 3

## 2023-10-16 ENCOUNTER — PATIENT MESSAGE (OUTPATIENT)
Dept: PRIMARY CARE CLINIC | Age: 43
End: 2023-10-16

## 2023-10-16 DIAGNOSIS — M54.41 BILATERAL LOW BACK PAIN WITH RIGHT-SIDED SCIATICA, UNSPECIFIED CHRONICITY: Primary | ICD-10-CM

## 2023-10-16 NOTE — TELEPHONE ENCOUNTER
From: Matt Howard  To: Luiza Rossi  Sent: 10/16/2023 3:44 PM EDT  Subject: Back pain-muscle spasm    Hello,  I saw you last week. I have one more dose of prednisone but my pain is still intense and unable to stand more than a minute. Next step?     Thanks

## 2023-10-25 ENCOUNTER — OFFICE VISIT (OUTPATIENT)
Dept: GYNECOLOGY | Age: 43
End: 2023-10-25
Payer: COMMERCIAL

## 2023-10-25 VITALS
WEIGHT: 286.6 LBS | HEART RATE: 127 BPM | DIASTOLIC BLOOD PRESSURE: 80 MMHG | RESPIRATION RATE: 12 BRPM | OXYGEN SATURATION: 98 % | SYSTOLIC BLOOD PRESSURE: 140 MMHG | BODY MASS INDEX: 49.19 KG/M2 | TEMPERATURE: 97.6 F

## 2023-10-25 DIAGNOSIS — E28.2 PCOS (POLYCYSTIC OVARIAN SYNDROME): Primary | ICD-10-CM

## 2023-10-25 PROCEDURE — 99204 OFFICE O/P NEW MOD 45 MIN: CPT | Performed by: OBSTETRICS & GYNECOLOGY

## 2023-10-25 PROCEDURE — 3079F DIAST BP 80-89 MM HG: CPT | Performed by: OBSTETRICS & GYNECOLOGY

## 2023-10-25 PROCEDURE — 3077F SYST BP >= 140 MM HG: CPT | Performed by: OBSTETRICS & GYNECOLOGY

## 2023-10-25 RX ORDER — NORGESTIMATE AND ETHINYL ESTRADIOL 0.25-0.035
1 KIT ORAL DAILY
Qty: 3 PACKET | Refills: 3 | Status: SHIPPED | OUTPATIENT
Start: 2023-10-25

## 2023-10-25 NOTE — PROGRESS NOTES
Chief complaint: Established New Doctor (Leandro Moura was taken out nov of last year since then no cycle, has PCOS, talk about BC options )      History of present illness: Cresencio Duenas 43 y.o. female No LMP recorded. (Menstrual status: Other - See Notes). Who presents with complaint of PCOS. The patient has been amenorrheic for the last year related to her PCOS. She is interested in oral contraceptive pills. She has been on Mirena IUD in the past and she felt that this exacerbated her HS. She is receiving Remicade for her HS and spironolactone. She feels that it is under control at this point. We discussed the rationale for menstrual management with PCOS to prevent endometrial hyperplasia and endometrial cancer. She has a history of hypertension and is currently on amlodipine 5 mg. She feels that she has good control over her blood pressure at this point however notes that because of her back pain and bulging disc, she will have pain and this will make her blood pressure go up. We discussed the risks associated use of oral contraceptive pills as well as the need to monitor blood pressure response. She is advised to check her blood pressure twice daily and she is instructed on how to do this as well as to notify her primary care provider regarding hypertension issues. We discussed possible need for additional blood pressure medicine. Bleeding warnings were reviewed noting the patient has been amenorrheic for the last year and is now going to be starting on oral contraceptive pills. We discussed potential anticipation of heavy cycles or abnormal bleeding. Bleeding warnings including need for possible follow-up in the emergency department for menorrhagia events were reviewed.     Patient Active Problem List   Diagnosis    Lumbar disc disease with radiculopathy    HTN (hypertension)    PCOS (polycystic ovarian syndrome)    Endometriosis    Anxiety and depression    Obstructive sleep apnea syndrome    Morbid

## 2023-10-29 ENCOUNTER — PATIENT MESSAGE (OUTPATIENT)
Dept: ENT CLINIC | Age: 43
End: 2023-10-29

## 2023-10-29 DIAGNOSIS — E04.1 THYROID NODULE: Primary | ICD-10-CM

## 2023-10-29 DIAGNOSIS — Z80.8 FAMILY HISTORY OF THYROID CANCER: ICD-10-CM

## 2023-10-30 NOTE — TELEPHONE ENCOUNTER
From: Jessica Nichols  To: Dr. Jessica Washington: 10/29/2023 4:59 PM EDT  Subject: Tests    Hello,  I think I am due for a scan on my thyroid. I don't when I'm due in to see you as well.     Thanks

## 2023-11-09 ENCOUNTER — PATIENT MESSAGE (OUTPATIENT)
Dept: PRIMARY CARE CLINIC | Age: 43
End: 2023-11-09

## 2023-11-10 ENCOUNTER — TELEMEDICINE (OUTPATIENT)
Dept: PRIMARY CARE CLINIC | Age: 43
End: 2023-11-10

## 2023-11-10 DIAGNOSIS — R11.10 VOMITING, UNSPECIFIED VOMITING TYPE, UNSPECIFIED WHETHER NAUSEA PRESENT: Primary | ICD-10-CM

## 2023-11-10 RX ORDER — TRAMADOL HYDROCHLORIDE 50 MG/1
50 TABLET ORAL EVERY 6 HOURS PRN
COMMUNITY

## 2023-11-10 RX ORDER — ONDANSETRON 4 MG/1
4 TABLET, ORALLY DISINTEGRATING ORAL EVERY 8 HOURS PRN
Qty: 20 TABLET | Refills: 0 | Status: SHIPPED | OUTPATIENT
Start: 2023-11-10

## 2023-11-10 RX ORDER — METHOCARBAMOL 750 MG/1
750 TABLET, FILM COATED ORAL 4 TIMES DAILY
COMMUNITY

## 2023-11-10 NOTE — TELEPHONE ENCOUNTER
From: Aria Sow  To: Dr. Ebony Cranker: 11/9/2023 5:57 PM EST  Subject: Vomiting     Hello,  I have been dealing with a herniated disc in my back. They prescribed tramadol and a muscle relaxer. I was fine when I started them. I saw obgyn and they started me on a birth control. I started get nauseated and vomiting Sunday-Monday. I stopped the birth control but vomiting has continued. I took zofran I have for when I have flares and nausea so I've been taking that and the vomiting has continued. I stopped the tramadol 2 days ago and muscle relaxer today even though I'm still having back pain. Vomiting is still here and I dont want to go into dehydration. Any suggestions?

## 2023-11-10 NOTE — PROGRESS NOTES
Abnormal -     Neurological:        [x] No Facial Asymmetry (Cranial nerve 7 motor function) (limited exam due to video visit)          [x] No gaze palsy        [] Abnormal -          Skin:        [x] No significant exanthematous lesions or discoloration noted on facial skin         [] Abnormal -            Psychiatric:       [x] Normal Affect [] Abnormal -        [x] No Hallucinations    Other pertinent observable physical exam findings:-    Electronically signed by IOANA Roberts CNP on 11/10/2023 at 11:12 AM     Please note this chart was generated using dragon dictation software. Although every effort was made to ensure the accuracy of this automated transcription, some errors in transcription may have occurred. Jenifer Ayers, was evaluated through a synchronous (real-time) audio-video encounter. The patient (or guardian if applicable) is aware that this is a billable service, which includes applicable co-pays. This Virtual Visit was conducted with patient's (and/or legal guardian's) consent. Patient identification was verified, and a caregiver was present when appropriate.    The patient was located at Home: Ulises Laird  Provider was located at [facility] (Appt Dept): [address],  [address]

## 2023-11-13 ENCOUNTER — OFFICE VISIT (OUTPATIENT)
Dept: PRIMARY CARE CLINIC | Age: 43
End: 2023-11-13
Payer: COMMERCIAL

## 2023-11-13 VITALS
WEIGHT: 279 LBS | DIASTOLIC BLOOD PRESSURE: 92 MMHG | RESPIRATION RATE: 16 BRPM | SYSTOLIC BLOOD PRESSURE: 130 MMHG | HEIGHT: 64 IN | HEART RATE: 98 BPM | BODY MASS INDEX: 47.63 KG/M2 | OXYGEN SATURATION: 87 % | TEMPERATURE: 98.5 F

## 2023-11-13 DIAGNOSIS — M51.16 LUMBAR DISC DISEASE WITH RADICULOPATHY: ICD-10-CM

## 2023-11-13 DIAGNOSIS — R11.10 VOMITING, UNSPECIFIED VOMITING TYPE, UNSPECIFIED WHETHER NAUSEA PRESENT: ICD-10-CM

## 2023-11-13 DIAGNOSIS — L40.50 PSORIATIC ARTHRITIS (HCC): ICD-10-CM

## 2023-11-13 DIAGNOSIS — I10 ESSENTIAL HYPERTENSION: Primary | ICD-10-CM

## 2023-11-13 DIAGNOSIS — E28.2 PCOS (POLYCYSTIC OVARIAN SYNDROME): ICD-10-CM

## 2023-11-13 DIAGNOSIS — G47.30 SLEEP APNEA, UNSPECIFIED TYPE: ICD-10-CM

## 2023-11-13 DIAGNOSIS — L73.2 HIDRADENITIS SUPPURATIVA: ICD-10-CM

## 2023-11-13 PROCEDURE — 99214 OFFICE O/P EST MOD 30 MIN: CPT | Performed by: FAMILY MEDICINE

## 2023-11-13 PROCEDURE — 3078F DIAST BP <80 MM HG: CPT | Performed by: FAMILY MEDICINE

## 2023-11-13 PROCEDURE — 3074F SYST BP LT 130 MM HG: CPT | Performed by: FAMILY MEDICINE

## 2023-11-13 ASSESSMENT — ENCOUNTER SYMPTOMS
SORE THROAT: 0
DIARRHEA: 0
SHORTNESS OF BREATH: 0
COUGH: 0
BACK PAIN: 1
NAUSEA: 0
VOMITING: 0
BLOOD IN STOOL: 0
ABDOMINAL PAIN: 0

## 2023-11-13 NOTE — PROGRESS NOTES
Chief Complaint   Patient presents with    Follow-up     Pt here for follow up visit         Tom Garcia is a 43 y.o. female who presents for routine visit    Pt did have episodes of vomiting for 6 days last week with no associated diarrhea, fever or abdominal pain and pt was recently started on BCPS by her OB/Gyne along with tramadol and flexeril medications for acute lumbar back pain by ALEXANDRU Quijano and pt stopped all of the medications with resolution. Pt did go to [address] [place] [address] regarding her lumbar back pain and was Dx with a herniated disk and is doing physical therapy    Pt's mother recently had a stroke       Pt has a hx of psoriatic arthritis and is followed by Rheumatology and is on IV inflectra medication with good control     Pt has a hx of Hidradenitis suppurativa since she was a teenager and is followed by [facility] and has had multiple surgeries (most recently 01/2020) along with an episode sepsis 2018. 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 Pt is with good control on IV inflectra medication and does take bursts of prednisone as needed recent flareups and also gets kenalog injections into lesions by Dermatology --- Patient did complete clifton procedures in her R torso and L breast area to prevent worsening flareups     Patient has a past medical history significant for HTN and is on norvasc 5 mg daily     Pt is on miralax and protonix for irritable bowel and is followed by GI      Pt has a hx of sleep apnea and is on CPAP machine with good control     Pt is on lexapro and buspar dosage was increased with 75% improvement of her anxiety/depression.  Pt is no longer taking ozempic medication for weight loss     Pt is followed by ENT for a thyroid nodule and recent thyroid US showed that it was stable      Pt has a hx of PCOS and endometriosis and is followed by OB/Gyne and was on mirena IUD but it was removed in 10/2022 and was

## 2023-12-08 ENCOUNTER — HOSPITAL ENCOUNTER (OUTPATIENT)
Dept: ULTRASOUND IMAGING | Age: 43
Discharge: HOME | End: 2023-12-08
Attending: STUDENT IN AN ORGANIZED HEALTH CARE EDUCATION/TRAINING PROGRAM
Payer: COMMERCIAL

## 2023-12-08 DIAGNOSIS — E04.1 THYROID NODULE: ICD-10-CM

## 2023-12-08 DIAGNOSIS — Z80.8 FAMILY HISTORY OF THYROID CANCER: ICD-10-CM

## 2023-12-08 PROCEDURE — 76536 US EXAM OF HEAD AND NECK: CPT

## 2023-12-29 ENCOUNTER — OFFICE VISIT (OUTPATIENT)
Dept: ENT CLINIC | Age: 43
End: 2023-12-29
Payer: COMMERCIAL

## 2023-12-29 VITALS
DIASTOLIC BLOOD PRESSURE: 91 MMHG | TEMPERATURE: 97.3 F | HEIGHT: 64 IN | SYSTOLIC BLOOD PRESSURE: 136 MMHG | HEART RATE: 90 BPM | OXYGEN SATURATION: 97 % | WEIGHT: 280 LBS | BODY MASS INDEX: 47.8 KG/M2

## 2023-12-29 DIAGNOSIS — E04.1 THYROID NODULE: Primary | ICD-10-CM

## 2023-12-29 DIAGNOSIS — Z80.8 FAMILY HISTORY OF THYROID CANCER: ICD-10-CM

## 2023-12-29 DIAGNOSIS — H90.72 MIXED CONDUCTIVE AND SENSORINEURAL HEARING LOSS OF LEFT EAR WITH UNRESTRICTED HEARING OF RIGHT EAR: ICD-10-CM

## 2023-12-29 PROCEDURE — 3080F DIAST BP >= 90 MM HG: CPT | Performed by: STUDENT IN AN ORGANIZED HEALTH CARE EDUCATION/TRAINING PROGRAM

## 2023-12-29 PROCEDURE — 99213 OFFICE O/P EST LOW 20 MIN: CPT | Performed by: STUDENT IN AN ORGANIZED HEALTH CARE EDUCATION/TRAINING PROGRAM

## 2023-12-29 PROCEDURE — 3075F SYST BP GE 130 - 139MM HG: CPT | Performed by: STUDENT IN AN ORGANIZED HEALTH CARE EDUCATION/TRAINING PROGRAM

## 2023-12-29 NOTE — PROGRESS NOTES
[facility] [address] VISIT      Patient Name: Caryle Miyamoto  Medical Record Number:  [id number]  Primary Care Physician:  Venus De Paz MD    ChiefComplaint     Chief Complaint   Patient presents with    Ear Problem     Ringing in ears. Follow-up     F/u from Thyroid US. History of Present Illness     Caryle Miyamoto is an 37 y.o. female previously seen for thyroid nodule, family history of thyroid cancer, bilateral tinnitus and hearing loss, bilateral tympanic membrane perforation, history of placement of ear tubes. Interval History:   No changes in hearing. Occasional mild pain and intermittent ringing in right ear but this is minimally bothersome. No otorrhea.     Past Medical History     Past Medical History:   Diagnosis Date    Endometriosis     Hypertension     Lumbar disc disorder     Nausea & vomiting     Polycystic ovarian syndrome     Sleep apnea 1/22/2014       Past Surgical History     Past Surgical History:   Procedure Laterality Date    CARDIAC SURGERY      cardia cath-no blockage    CHOLECYSTECTOMY  2008    EYE SURGERY  2010    detached retina    OTHER SURGICAL HISTORY  01/21/14    L4-5 BILATERAL LAMINECTOMY AND DISCECTOMY    OVARY SURGERY      multiple times    TONSILLECTOMY  1985    TYMPANOSTOMY TUBE PLACEMENT         Family History     Family History   Problem Relation Age of Onset    Kidney Disease Mother         CKD    Hypertension Mother     Diabetes Mother     High Blood Pressure Father     COPD Father     Emphysema Father     Elevated Lipids Sister         hypertriglyceridemia    Thyroid Cancer Sister     Asthma Brother     Sleep Apnea Brother     Depression Brother     Anxiety Disorder Brother     Other Maternal Grandfather         murdered    Stroke Paternal Grandmother     Heart Disease Paternal Grandfather        Social History     Social History     Tobacco Use    Smoking status: Never    Smokeless tobacco:

## 2024-01-11 ENCOUNTER — OFFICE VISIT (OUTPATIENT)
Dept: ENDOCRINOLOGY | Age: 44
End: 2024-01-11
Payer: COMMERCIAL

## 2024-01-11 VITALS
HEART RATE: 88 BPM | SYSTOLIC BLOOD PRESSURE: 150 MMHG | OXYGEN SATURATION: 97 % | WEIGHT: 285.2 LBS | BODY MASS INDEX: 48.69 KG/M2 | DIASTOLIC BLOOD PRESSURE: 92 MMHG | HEIGHT: 64 IN

## 2024-01-11 DIAGNOSIS — E28.2 PCOS (POLYCYSTIC OVARIAN SYNDROME): ICD-10-CM

## 2024-01-11 DIAGNOSIS — E66.01 MORBID OBESITY DUE TO EXCESS CALORIES (HCC): ICD-10-CM

## 2024-01-11 DIAGNOSIS — L68.0 HIRSUTISM: ICD-10-CM

## 2024-01-11 DIAGNOSIS — E28.2 PCOS (POLYCYSTIC OVARIAN SYNDROME): Primary | ICD-10-CM

## 2024-01-11 LAB
ALBUMIN SERPL-MCNC: 4.3 G/DL (ref 3.4–5)
ALBUMIN/GLOB SERPL: 1.5 {RATIO} (ref 1.1–2.2)
ALP SERPL-CCNC: 92 U/L (ref 40–129)
ALT SERPL-CCNC: 27 U/L (ref 10–40)
ANION GAP SERPL CALCULATED.3IONS-SCNC: 16 MMOL/L (ref 3–16)
AST SERPL-CCNC: 22 U/L (ref 15–37)
BILIRUB SERPL-MCNC: 0.3 MG/DL (ref 0–1)
BUN SERPL-MCNC: 12 MG/DL (ref 7–20)
CALCIUM SERPL-MCNC: 9.2 MG/DL (ref 8.3–10.6)
CHLORIDE SERPL-SCNC: 98 MMOL/L (ref 99–110)
CO2 SERPL-SCNC: 19 MMOL/L (ref 21–32)
CREAT SERPL-MCNC: 0.8 MG/DL (ref 0.6–1.1)
GFR SERPLBLD CREATININE-BSD FMLA CKD-EPI: >60 ML/MIN/{1.73_M2}
GLUCOSE SERPL-MCNC: 133 MG/DL (ref 70–99)
POTASSIUM SERPL-SCNC: 4.4 MMOL/L (ref 3.5–5.1)
PROT SERPL-MCNC: 7.2 G/DL (ref 6.4–8.2)
SODIUM SERPL-SCNC: 133 MMOL/L (ref 136–145)

## 2024-01-11 PROCEDURE — 3080F DIAST BP >= 90 MM HG: CPT | Performed by: INTERNAL MEDICINE

## 2024-01-11 PROCEDURE — 99214 OFFICE O/P EST MOD 30 MIN: CPT | Performed by: INTERNAL MEDICINE

## 2024-01-11 PROCEDURE — 3077F SYST BP >= 140 MM HG: CPT | Performed by: INTERNAL MEDICINE

## 2024-01-11 NOTE — PROGRESS NOTES
Pulmonary/Chest: Effort normal and breath sounds normal.   Musculoskeletal: Normal range of motion.   Neurological: Patient is alert and oriented to person, place, and time. Patient has normal reflexes.   Skin: Skin is warm and dry. Clear to pink colored stretch marks.   Psychiatric: Patient has a normal mood and affect. Patient behavior is normal.     Lab Review:      Orders Only on 10/05/2023   Component Date Value Ref Range Status    Testosterone 10/05/2023 20  20 - 70 ng/dL Final    Sex Hormone Binding 10/05/2023 29 (L)  30 - 135 nmol/L Final    Testosterone, Free 10/05/2023 3.8  1.1 - 5.8 pg/mL Final    Prolactin 10/05/2023 11.9  ng/mL Final   Abstract on 07/27/2023   Component Date Value Ref Range Status    Diabetic Retinopathy 07/26/2023 Positive   Final   Orders Only on 07/26/2023   Component Date Value Ref Range Status    Visual Acuity Distance Right Eye 07/26/2023 20/20   Final    Intraocular Pressure Eye 07/26/2023 14   Final    Visual Acuity Distance Left Eye 07/26/2023 20/20   Final    Intraocular Pressure Eye 07/26/2023 14   Final    Diabetic Retinopathy 07/26/2023 NEGATIVE   Final    Cataracts 07/26/2023 NEGATIVE   Final    Glaucoma 07/26/2023 NEGATIVE   Final   Orders Only on 07/07/2023   Component Date Value Ref Range Status    T3, Total 07/07/2023 1.21  0.80 - 2.00 ng/mL Final    T3, Free 07/07/2023 2.8  2.3 - 4.2 pg/mL Final    T4 Free 07/07/2023 0.9  0.9 - 1.8 ng/dL Final    TSH 07/07/2023 2.43  0.27 - 4.20 uIU/mL Final    Prolactin 07/07/2023 31.8  ng/mL Final    Misc Test Order 07/07/2023 SEE NOTE   Final   Orders Only on 06/14/2023   Component Date Value Ref Range Status    Testosterone 06/14/2023 29  20 - 70 ng/dL Final    Sex Hormone Binding 06/14/2023 15 (L)  30 - 135 nmol/L Final    Testosterone, Free 06/14/2023 7.6 (H)  1.1 - 5.8 pg/mL Final   Admission on 05/10/2023, Discharged on 05/10/2023   Component Date Value Ref Range Status    WBC 05/10/2023 3.7 (L)  4.0 - 11.0 K/uL Final    RBC

## 2024-01-14 LAB
SHBG SERPL-SCNC: 71 NMOL/L (ref 30–135)
TESTOST FREE SERPL-MCNC: 1.8 PG/ML (ref 1.1–5.8)
TESTOST SERPL-MCNC: 17 NG/DL (ref 20–70)

## 2024-02-02 ENCOUNTER — PATIENT MESSAGE (OUTPATIENT)
Dept: PRIMARY CARE CLINIC | Age: 44
End: 2024-02-02

## 2024-02-02 NOTE — TELEPHONE ENCOUNTER
From: Krystle Pope  To: Dr. Brad Brunner  Sent: 2/2/2024 2:01 PM EST  Subject: Possible stomach bug    Hello,   My  was sick a few days ago with vomiting and diarrhea it lasted about 24 hours. I got it on Wednesday had constant vomiting and diarrhea until yesterday. Vomiting has stopped but still having diarrhea and feel really tired and light headed. Everyone else in the family got it was better in 24 hours so I thought it was just a stomach bug. Anything I can do at home to avoid having to go in for fluids? I know I dehydrate easily.     Thanks   Gerry

## 2024-02-04 DIAGNOSIS — F32.A ANXIETY AND DEPRESSION: ICD-10-CM

## 2024-02-04 DIAGNOSIS — F41.9 ANXIETY AND DEPRESSION: ICD-10-CM

## 2024-02-05 RX ORDER — BUSPIRONE HYDROCHLORIDE 15 MG/1
TABLET ORAL
Qty: 60 TABLET | Refills: 2 | Status: SHIPPED | OUTPATIENT
Start: 2024-02-05

## 2024-02-05 RX ORDER — ESCITALOPRAM OXALATE 20 MG/1
TABLET ORAL
Qty: 30 TABLET | Refills: 2 | Status: SHIPPED | OUTPATIENT
Start: 2024-02-05

## 2024-02-05 NOTE — TELEPHONE ENCOUNTER
Medication:   Requested Prescriptions     Pending Prescriptions Disp Refills    busPIRone (BUSPAR) 15 MG tablet [Pharmacy Med Name: BUSPIRONE 15MG TABLETS] 60 tablet 3     Sig: TAKE 1 TABLET BY MOUTH IN THE MORNING AND IN THE EVENING    escitalopram (LEXAPRO) 20 MG tablet [Pharmacy Med Name: ESCITALOPRAM 20MG TABLETS] 30 tablet 3     Sig: TAKE 1 TABLET BY MOUTH EVERY DAY        Last Filled:    Buspar - 9/28/2023 #60 w/ 3 refills   Lexapro - 9/28/2023 #30 w/ 3 refills    Patient Phone Number: [phone] (home)     Last appt: 11/13/2023   Next appt: 3/13/2024    Last OARRS:        No data to display

## 2024-02-21 ENCOUNTER — HOSPITAL ENCOUNTER (EMERGENCY)
Age: 44
Discharge: HOME | End: 2024-02-21
Attending: STUDENT IN AN ORGANIZED HEALTH CARE EDUCATION/TRAINING PROGRAM
Payer: COMMERCIAL

## 2024-02-21 ENCOUNTER — APPOINTMENT (OUTPATIENT)
Age: 44
End: 2024-02-21
Payer: COMMERCIAL

## 2024-02-21 VITALS
HEIGHT: 64 IN | RESPIRATION RATE: 18 BRPM | TEMPERATURE: 100 F | SYSTOLIC BLOOD PRESSURE: 144 MMHG | DIASTOLIC BLOOD PRESSURE: 70 MMHG | WEIGHT: 286 LBS | BODY MASS INDEX: 48.83 KG/M2 | OXYGEN SATURATION: 100 % | HEART RATE: 99 BPM

## 2024-02-21 DIAGNOSIS — B34.9 VIRAL ILLNESS: ICD-10-CM

## 2024-02-21 DIAGNOSIS — J18.9 PNEUMONIA OF RIGHT LUNG DUE TO INFECTIOUS ORGANISM, UNSPECIFIED PART OF LUNG: ICD-10-CM

## 2024-02-21 DIAGNOSIS — R05.1 ACUTE COUGH: Primary | ICD-10-CM

## 2024-02-21 LAB
ALBUMIN SERPL-MCNC: 3.8 G/DL (ref 3.4–5)
ALBUMIN/GLOB SERPL: 1.1 {RATIO}
ALP SERPL-CCNC: 86 U/L (ref 40–129)
ALT SERPL-CCNC: 16 U/L (ref 10–40)
ANION GAP SERPL CALCULATED.3IONS-SCNC: 9 MMOL/L (ref 3–16)
AST SERPL-CCNC: 21 U/L (ref 15–37)
BASOPHILS # BLD: 0.02 K/UL (ref 0–0.2)
BASOPHILS NFR BLD: 0 %
BILIRUB SERPL-MCNC: <0.2 MG/DL (ref 0–1)
BNP SERPL-MCNC: 248 PG/ML (ref 0–124)
BUN SERPL-MCNC: 15 MG/DL (ref 7–20)
CALCIUM SERPL-MCNC: 9.3 MG/DL (ref 8.3–10.6)
CHLORIDE SERPL-SCNC: 102 MMOL/L (ref 99–110)
CO2 SERPL-SCNC: 25 MMOL/L (ref 21–32)
CREAT SERPL-MCNC: 0.9 MG/DL (ref 0.5–1)
D DIMER PPP FEU-MCNC: 0.27 UG/ML FEU (ref 0–0.6)
EOSINOPHIL # BLD: 0.01 K/UL (ref 0–0.6)
EOSINOPHILS RELATIVE PERCENT: 0 %
ERYTHROCYTE [DISTWIDTH] IN BLOOD BY AUTOMATED COUNT: 15.3 % (ref 12.4–15.4)
GFR SERPL CREATININE-BSD FRML MDRD: >60 ML/MIN/1.73M2
GLUCOSE SERPL-MCNC: 136 MG/DL (ref 70–99)
HCT VFR BLD AUTO: 43 % (ref 36–48)
HGB BLD-MCNC: 13.8 G/DL (ref 12–16)
IMM GRANULOCYTES # BLD AUTO: 0.03 K/UL (ref 0–0.5)
IMM GRANULOCYTES NFR BLD: 0 %
LYMPHOCYTES NFR BLD: 1.28 K/UL (ref 1–5.1)
LYMPHOCYTES RELATIVE PERCENT: 13 %
MCH RBC QN AUTO: 29.2 PG (ref 26–34)
MCHC RBC AUTO-ENTMCNC: 32.1 G/DL (ref 31–36)
MCV RBC AUTO: 90.9 FL (ref 80–100)
MONOCYTES NFR BLD: 0.53 K/UL (ref 0–1.3)
MONOCYTES NFR BLD: 5 %
NEUTROPHILS NFR BLD: 82 %
NEUTS SEG NFR BLD: 8.4 K/UL (ref 1.7–7.7)
PLATELET # BLD AUTO: 283 K/UL (ref 135–450)
PMV BLD AUTO: 10.1 FL
POTASSIUM SERPL-SCNC: 4.3 MMOL/L (ref 3.5–5.1)
PROT SERPL-MCNC: 7.3 G/DL (ref 6.4–8.2)
RBC # BLD AUTO: 4.73 M/UL (ref 4–5.2)
SODIUM SERPL-SCNC: 136 MMOL/L (ref 136–145)
WBC OTHER # BLD: 10.3 K/UL (ref 4–11)

## 2024-02-21 PROCEDURE — 93005 ELECTROCARDIOGRAM TRACING: CPT

## 2024-02-21 PROCEDURE — 94640 AIRWAY INHALATION TREATMENT: CPT

## 2024-02-21 PROCEDURE — 71046 X-RAY EXAM CHEST 2 VIEWS: CPT

## 2024-02-21 PROCEDURE — 85025 COMPLETE CBC W/AUTO DIFF WBC: CPT

## 2024-02-21 PROCEDURE — 6370000000 HC RX 637 (ALT 250 FOR IP): Performed by: STUDENT IN AN ORGANIZED HEALTH CARE EDUCATION/TRAINING PROGRAM

## 2024-02-21 PROCEDURE — 99285 EMERGENCY DEPT VISIT HI MDM: CPT

## 2024-02-21 PROCEDURE — 36415 COLL VENOUS BLD VENIPUNCTURE: CPT

## 2024-02-21 PROCEDURE — 85379 FIBRIN DEGRADATION QUANT: CPT

## 2024-02-21 PROCEDURE — 80053 COMPREHEN METABOLIC PANEL: CPT

## 2024-02-21 PROCEDURE — 83880 ASSAY OF NATRIURETIC PEPTIDE: CPT

## 2024-02-21 RX ORDER — IBUPROFEN 800 MG/1
800 TABLET ORAL
Status: COMPLETED | OUTPATIENT
Start: 2024-02-21 | End: 2024-02-21

## 2024-02-21 RX ORDER — ACETAMINOPHEN 500 MG
1000 TABLET ORAL
Status: COMPLETED | OUTPATIENT
Start: 2024-02-21 | End: 2024-02-21

## 2024-02-21 RX ORDER — BENZONATATE 100 MG/1
200 CAPSULE ORAL ONCE
Status: COMPLETED | OUTPATIENT
Start: 2024-02-21 | End: 2024-02-21

## 2024-02-21 RX ORDER — AMOXICILLIN AND CLAVULANATE POTASSIUM 875; 125 MG/1; MG/1
1 TABLET, FILM COATED ORAL ONCE
Status: COMPLETED | OUTPATIENT
Start: 2024-02-21 | End: 2024-02-21

## 2024-02-21 RX ORDER — AMOXICILLIN AND CLAVULANATE POTASSIUM 875; 125 MG/1; MG/1
1 TABLET, FILM COATED ORAL 2 TIMES DAILY
Qty: 14 TABLET | Refills: 0 | Status: SHIPPED | OUTPATIENT
Start: 2024-02-21 | End: 2024-02-28

## 2024-02-21 RX ORDER — BENZONATATE 100 MG/1
100 CAPSULE ORAL 2 TIMES DAILY PRN
Qty: 20 CAPSULE | Refills: 0 | Status: SHIPPED | OUTPATIENT
Start: 2024-02-21 | End: 2024-02-28

## 2024-02-21 RX ORDER — IPRATROPIUM BROMIDE AND ALBUTEROL SULFATE 2.5; .5 MG/3ML; MG/3ML
3 SOLUTION RESPIRATORY (INHALATION) ONCE
Status: COMPLETED | OUTPATIENT
Start: 2024-02-21 | End: 2024-02-21

## 2024-02-21 RX ADMIN — AMOXICILLIN AND CLAVULANATE POTASSIUM 1 TABLET: 875; 125 TABLET, FILM COATED ORAL at 18:05

## 2024-02-21 RX ADMIN — IBUPROFEN 800 MG: 800 TABLET, FILM COATED ORAL at 17:01

## 2024-02-21 RX ADMIN — IPRATROPIUM BROMIDE AND ALBUTEROL SULFATE 3 DOSE: 2.5; .5 SOLUTION RESPIRATORY (INHALATION) at 17:21

## 2024-02-21 RX ADMIN — ACETAMINOPHEN 1000 MG: 500 TABLET ORAL at 17:01

## 2024-02-21 RX ADMIN — BENZONATATE 200 MG: 100 CAPSULE ORAL at 17:01

## 2024-02-21 ASSESSMENT — PAIN DESCRIPTION - LOCATION: LOCATION: GENERALIZED

## 2024-02-21 ASSESSMENT — PAIN DESCRIPTION - DESCRIPTORS: DESCRIPTORS: DISCOMFORT

## 2024-02-21 ASSESSMENT — PAIN SCALES - GENERAL
PAINLEVEL_OUTOF10: 0
PAINLEVEL_OUTOF10: 2

## 2024-02-21 ASSESSMENT — PAIN - FUNCTIONAL ASSESSMENT: PAIN_FUNCTIONAL_ASSESSMENT: 0-10

## 2024-02-21 NOTE — DISCHARGE INSTRUCTIONS
Please follow-up with your primary care doctor for repeat evaluation.  Please take medication as prescribed.

## 2024-02-21 NOTE — ED PROVIDER NOTES
birth control 2 months ago and cannot be perked out.  EKG is nonischemic, low suspicion for ACS as cause of patient's symptoms given that she also reports no chest pain.  Patient given Tessalon Perles, ibuprofen, Tylenol in emergency department.  Patient also given DuoNeb therapy in emergency department due to her stating that she has had treatment before in the ER which helped her breathing, it should be noted that patient does not have any official diagnosis of asthma and she had slight decreased aeration in the right lower lobe however no obvious signs of wheezing.  Given that patient reported history of treatment with albuterol that helped her symptoms we will trial her in the emergency department.    Update: CBC within normal limits.  CMP within normal limits.  BNP slightly elevated at 248.  D-dimer within normal limits.  Given D-dimer is negative there is low suspicion for pulmonary embolism as cause of patient's shortness of breath.  After administration of Tylenol, DuoNeb, night, ibuprofen patient states she feels better.  Patient states her cough is improved.  I personally interpreted chest x-ray which is concerning for inflammatory process versus infective process in the right lobe.  Given patient has had persistent cough she will be treated empirically as a pneumonia.  Patient will be prescribed Augmentin.  Patient is currently on her last day of azithromycin.  Patient was given strict return precautions.  Patient will follow-up with her primary care doctor in 5 days.      - Chronic Conditions: Diabetes, aspiration pneumonia, hidradenitis, MARCO, PCOS, HTN    - Records reviewed: Office visit from 2/18/2020      - Consults: None     - Pertinent social determinants: None  - Tests considered/Risk stratification tools: D-dimer    - Disposition consideration: Appropriate for outpatient management    Is this patient to be included in the SEP-1 Core Measure?  No   Exclusion criteria - the patient is NOT to be

## 2024-02-21 NOTE — ED NOTES
Patient prepared for and ready to be discharged. Patient discharged at this time to home in care of self in no acute distress after verbalizing understanding of discharge instructions. Patient left after receiving After Visit Summary instructions. IV removed prior to discharge.    
Provider at bedside answering pt's questions regarding discharge antibiotics.   
Pt back in room from x-ray.  
Pt off floor at x-ray.   
Respiratory therapy at bedside.   
This writer messaged respiratory therapy regarding pt's nebulizer treatment. Respiratory states they will be down to ED to administer the duoneb.  
1-2 drinks

## 2024-02-23 LAB
EKG ATRIAL RATE: 82 BPM
EKG DIAGNOSIS: NORMAL
EKG P AXIS: 69 DEGREES
EKG P-R INTERVAL: 178 MS
EKG Q-T INTERVAL: 352 MS
EKG QRS DURATION: 70 MS
EKG QTC CALCULATION (BAZETT): 411 MS
EKG R AXIS: 74 DEGREES
EKG T AXIS: 35 DEGREES
EKG VENTRICULAR RATE: 82 BPM

## 2024-03-13 ENCOUNTER — OFFICE VISIT (OUTPATIENT)
Dept: PRIMARY CARE CLINIC | Age: 44
End: 2024-03-13
Payer: COMMERCIAL

## 2024-03-13 VITALS
WEIGHT: 284 LBS | BODY MASS INDEX: 48.49 KG/M2 | HEIGHT: 64 IN | TEMPERATURE: 97.3 F | SYSTOLIC BLOOD PRESSURE: 124 MMHG | DIASTOLIC BLOOD PRESSURE: 64 MMHG | OXYGEN SATURATION: 97 % | HEART RATE: 100 BPM | RESPIRATION RATE: 16 BRPM

## 2024-03-13 DIAGNOSIS — I10 ESSENTIAL HYPERTENSION: Primary | ICD-10-CM

## 2024-03-13 DIAGNOSIS — L73.2 HIDRADENITIS SUPPURATIVA: ICD-10-CM

## 2024-03-13 DIAGNOSIS — M51.16 LUMBAR DISC DISEASE WITH RADICULOPATHY: ICD-10-CM

## 2024-03-13 DIAGNOSIS — L40.50 PSORIATIC ARTHRITIS (HCC): ICD-10-CM

## 2024-03-13 DIAGNOSIS — F32.A ANXIETY AND DEPRESSION: ICD-10-CM

## 2024-03-13 DIAGNOSIS — N80.9 ENDOMETRIOSIS: ICD-10-CM

## 2024-03-13 DIAGNOSIS — E28.2 PCOS (POLYCYSTIC OVARIAN SYNDROME): ICD-10-CM

## 2024-03-13 DIAGNOSIS — F41.9 ANXIETY AND DEPRESSION: ICD-10-CM

## 2024-03-13 DIAGNOSIS — R73.03 BORDERLINE DIABETES: ICD-10-CM

## 2024-03-13 LAB — HBA1C MFR BLD: 5.9 %

## 2024-03-13 PROCEDURE — 99214 OFFICE O/P EST MOD 30 MIN: CPT | Performed by: FAMILY MEDICINE

## 2024-03-13 PROCEDURE — 3074F SYST BP LT 130 MM HG: CPT | Performed by: FAMILY MEDICINE

## 2024-03-13 PROCEDURE — 3078F DIAST BP <80 MM HG: CPT | Performed by: FAMILY MEDICINE

## 2024-03-13 PROCEDURE — 83036 HEMOGLOBIN GLYCOSYLATED A1C: CPT | Performed by: FAMILY MEDICINE

## 2024-03-13 RX ORDER — PREDNISONE 20 MG/1
TABLET ORAL
COMMUNITY
Start: 2024-02-18 | End: 2024-03-13 | Stop reason: ALTCHOICE

## 2024-03-13 RX ORDER — ALBUTEROL SULFATE 90 UG/1
2 AEROSOL, METERED RESPIRATORY (INHALATION) EVERY 6 HOURS PRN
COMMUNITY
End: 2024-03-13 | Stop reason: ALTCHOICE

## 2024-03-13 RX ORDER — PANTOPRAZOLE SODIUM 40 MG/1
40 TABLET, DELAYED RELEASE ORAL DAILY
COMMUNITY
Start: 2024-02-24

## 2024-03-13 SDOH — ECONOMIC STABILITY: INCOME INSECURITY: HOW HARD IS IT FOR YOU TO PAY FOR THE VERY BASICS LIKE FOOD, HOUSING, MEDICAL CARE, AND HEATING?: NOT HARD AT ALL

## 2024-03-13 SDOH — ECONOMIC STABILITY: FOOD INSECURITY: WITHIN THE PAST 12 MONTHS, YOU WORRIED THAT YOUR FOOD WOULD RUN OUT BEFORE YOU GOT MONEY TO BUY MORE.: NEVER TRUE

## 2024-03-13 SDOH — ECONOMIC STABILITY: FOOD INSECURITY: WITHIN THE PAST 12 MONTHS, THE FOOD YOU BOUGHT JUST DIDN'T LAST AND YOU DIDN'T HAVE MONEY TO GET MORE.: NEVER TRUE

## 2024-03-13 ASSESSMENT — PATIENT HEALTH QUESTIONNAIRE - PHQ9
SUM OF ALL RESPONSES TO PHQ QUESTIONS 1-9: 6
7. TROUBLE CONCENTRATING ON THINGS, SUCH AS READING THE NEWSPAPER OR WATCHING TELEVISION: NOT AT ALL
8. MOVING OR SPEAKING SO SLOWLY THAT OTHER PEOPLE COULD HAVE NOTICED. OR THE OPPOSITE, BEING SO FIGETY OR RESTLESS THAT YOU HAVE BEEN MOVING AROUND A LOT MORE THAN USUAL: NOT AT ALL
SUM OF ALL RESPONSES TO PHQ QUESTIONS 1-9: 6
SUM OF ALL RESPONSES TO PHQ QUESTIONS 1-9: 6
1. LITTLE INTEREST OR PLEASURE IN DOING THINGS: SEVERAL DAYS
6. FEELING BAD ABOUT YOURSELF - OR THAT YOU ARE A FAILURE OR HAVE LET YOURSELF OR YOUR FAMILY DOWN: SEVERAL DAYS
3. TROUBLE FALLING OR STAYING ASLEEP: SEVERAL DAYS
SUM OF ALL RESPONSES TO PHQ QUESTIONS 1-9: 6
10. IF YOU CHECKED OFF ANY PROBLEMS, HOW DIFFICULT HAVE THESE PROBLEMS MADE IT FOR YOU TO DO YOUR WORK, TAKE CARE OF THINGS AT HOME, OR GET ALONG WITH OTHER PEOPLE: NOT DIFFICULT AT ALL
9. THOUGHTS THAT YOU WOULD BE BETTER OFF DEAD, OR OF HURTING YOURSELF: NOT AT ALL
5. POOR APPETITE OR OVEREATING: MORE THAN HALF THE DAYS
SUM OF ALL RESPONSES TO PHQ9 QUESTIONS 1 & 2: 1
2. FEELING DOWN, DEPRESSED OR HOPELESS: NOT AT ALL
4. FEELING TIRED OR HAVING LITTLE ENERGY: SEVERAL DAYS

## 2024-03-13 ASSESSMENT — ANXIETY QUESTIONNAIRES
IF YOU CHECKED OFF ANY PROBLEMS ON THIS QUESTIONNAIRE, HOW DIFFICULT HAVE THESE PROBLEMS MADE IT FOR YOU TO DO YOUR WORK, TAKE CARE OF THINGS AT HOME, OR GET ALONG WITH OTHER PEOPLE: SOMEWHAT DIFFICULT
3. WORRYING TOO MUCH ABOUT DIFFERENT THINGS: SEVERAL DAYS
7. FEELING AFRAID AS IF SOMETHING AWFUL MIGHT HAPPEN: NOT AT ALL
4. TROUBLE RELAXING: SEVERAL DAYS
1. FEELING NERVOUS, ANXIOUS, OR ON EDGE: MORE THAN HALF THE DAYS
6. BECOMING EASILY ANNOYED OR IRRITABLE: NOT AT ALL
2. NOT BEING ABLE TO STOP OR CONTROL WORRYING: SEVERAL DAYS
GAD7 TOTAL SCORE: 5
5. BEING SO RESTLESS THAT IT IS HARD TO SIT STILL: NOT AT ALL

## 2024-03-13 NOTE — PROGRESS NOTES
Chief Complaint   Patient presents with    Follow-up         Krystlejoão Pope is a 43 y.o. female who presents for routine visit    Pt did go to the ER last month secondary to SOB and a productibe cough and had CXR done which showed a R lung pneumonia and pt was treated with augmentin antibiotic and a prednisone taper with resolution    Pt did go to [facility] regarding her lumbar back pain and was Dx with a herniated disk and is doing physical therapy --- Pt did get an epidural shot with no improvemnent and started seeing a chiropractor which has helped     Pt's mother recently had a stroke       Pt has a hx of psoriatic arthritis and is followed by Rheumatology and is on IV inflectra medication with good control and had recent infusion with good bloodwork     Pt has a hx of Hidradenitis suppurativa since she was a teenager and is followed by [facility] and has had multiple surgeries (most recently 01/2020) along with an episode sepsis 2018. 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 Pt is with good control on IV inflectra medication and does take bursts of prednisone as needed recent flareups and also gets kenalog injections into lesions by Dermatology --- Patient did complete clifton procedures in her R torso and L breast area to prevent worsening flareups     Patient has a past medical history significant for HTN and is on norvasc 5 mg daily     Pt is on miralax and protonix for irritable bowel and is followed by GI      Pt has a hx of sleep apnea and is on CPAP machine with good control     Pt is on lexapro and buspar dosage was increased with 75% improvement of her anxiety/depression. Pt is no longer taking ozempic medication for weight loss     Pt is followed by ENT for a thyroid nodule and recent thyroid US showed that it was stable      Pt has a hx of PCOS and endometriosis and is followed by OB/Gyne and was on mirena IUD but it was removed in

## 2024-05-13 ENCOUNTER — OFFICE VISIT (OUTPATIENT)
Dept: ORTHOPEDIC SURGERY | Age: 44
End: 2024-05-13
Payer: COMMERCIAL

## 2024-05-13 VITALS — BODY MASS INDEX: 48.49 KG/M2 | HEIGHT: 64 IN | WEIGHT: 284 LBS

## 2024-05-13 DIAGNOSIS — Z98.890 HISTORY OF LUMBAR SURGERY: Primary | ICD-10-CM

## 2024-05-13 DIAGNOSIS — F41.9 ANXIETY AND DEPRESSION: ICD-10-CM

## 2024-05-13 DIAGNOSIS — F32.A ANXIETY AND DEPRESSION: ICD-10-CM

## 2024-05-13 DIAGNOSIS — M54.16 LEFT LUMBAR RADICULITIS: ICD-10-CM

## 2024-05-13 DIAGNOSIS — M51.26 HNP (HERNIATED NUCLEUS PULPOSUS), LUMBAR: ICD-10-CM

## 2024-05-13 PROCEDURE — 99204 OFFICE O/P NEW MOD 45 MIN: CPT | Performed by: PHYSICIAN ASSISTANT

## 2024-05-13 RX ORDER — BUSPIRONE HYDROCHLORIDE 15 MG/1
TABLET ORAL
Qty: 60 TABLET | Refills: 3 | Status: SHIPPED | OUTPATIENT
Start: 2024-05-13

## 2024-05-13 RX ORDER — ESCITALOPRAM OXALATE 20 MG/1
TABLET ORAL
Qty: 30 TABLET | Refills: 3 | Status: SHIPPED | OUTPATIENT
Start: 2024-05-13

## 2024-05-13 RX ORDER — MELOXICAM 15 MG/1
15 TABLET ORAL DAILY PRN
Qty: 30 TABLET | Refills: 0 | Status: SHIPPED | OUTPATIENT
Start: 2024-05-13 | End: 2024-06-12

## 2024-05-13 NOTE — TELEPHONE ENCOUNTER
Medication:   Requested Prescriptions     Pending Prescriptions Disp Refills    escitalopram (LEXAPRO) 20 MG tablet [Pharmacy Med Name: ESCITALOPRAM 20MG TABLETS] 30 tablet 2     Sig: TAKE 1 TABLET BY MOUTH EVERY DAY    busPIRone (BUSPAR) 15 MG tablet [Pharmacy Med Name: BUSPIRONE 15MG TABLETS] 60 tablet 2     Sig: TAKE 1 TABLET BY MOUTH IN THE MORNING AND IN THE EVENING        Last Filled: Lexapro- 02/05/2024 #30 with 2 refills   Buspar-02/05/2024 #60 with 2 refills     Patient Phone Number: [phone] (home)     Last appt: 3/13/2024   Next appt: 7/15/2024    Last OARRS:        No data to display

## 2024-05-13 NOTE — PROGRESS NOTES
New Patient: SPINE    5/13/2024     CHIEF COMPLAINT:    Chief Complaint   Patient presents with    New Patient     LUMBAR/REF BY HOLA RICHTER       HISTORY OF PRESENT ILLNESS:              The patient is a 43 y.o. female history of hypertension, endometriosis, PCOS, referred by Svetlana Islas APRN - CNP for chronic back pain.  She has a history of 2014 L4-5 MLL with Dr. Gary and initially did well.  She was at a softball game in October 2023 when she experienced recurrent low back and radiating left leg pain.  She currently describes aching/stabbing/burning left low back/buttock pain radiating into the posterior thigh/calf to the foot with numbness and tingling.  She reports chronic left leg weakness.  Her left leg pain is most bothersome (80%).  Her symptoms are increased with prolonged sitting, standing, bending or walking.  She reports some relief with resting.  Conservative care includes physical therapy, chiropractics, NSAIDs, oral steroids.  She underwent a left L5-S1 interlaminar JETT January 2024 with Kalen without improvement.  She denies any progressive extremity weakness.  She denies any contralateral radiating pain.  She denies any bowel or bladder dysfunction or saddle anesthesia.  Denies any recent fevers chills or infections.  Denies any recent injury or trauma.    The pain assessment was noted & reviewed in the medical record today.     Current/Past Treatment:   Physical Therapy: Yes with continued HEP  Chiropractic:   Yes with some benefit  Injection:  left L5-S1 interlaminar JETT January 2024--Kalen, no relief   Medications:            NSAIDS: IBU            Muscle relaxer:              Steriods:  MDP            Neuropathic medications:              Opioids:            Other:   Surgery/Consult: S/p L4-5 MLLKalen with recent consult rec: JETT    Work Status/Functionality: retired law enforcement     Past Medical History: Medical history form was reviewed today &

## 2024-05-27 SDOH — HEALTH STABILITY: PHYSICAL HEALTH: ON AVERAGE, HOW MANY MINUTES DO YOU ENGAGE IN EXERCISE AT THIS LEVEL?: 0 MIN

## 2024-05-27 SDOH — HEALTH STABILITY: PHYSICAL HEALTH: ON AVERAGE, HOW MANY DAYS PER WEEK DO YOU ENGAGE IN MODERATE TO STRENUOUS EXERCISE (LIKE A BRISK WALK)?: 0 DAYS

## 2024-05-28 ENCOUNTER — OFFICE VISIT (OUTPATIENT)
Dept: ORTHOPEDIC SURGERY | Age: 44
End: 2024-05-28
Payer: COMMERCIAL

## 2024-05-28 VITALS — BODY MASS INDEX: 48.49 KG/M2 | HEIGHT: 64 IN | WEIGHT: 284 LBS

## 2024-05-28 DIAGNOSIS — M51.16 LUMBAR DISC HERNIATION WITH RADICULOPATHY: Primary | ICD-10-CM

## 2024-05-28 PROCEDURE — 99214 OFFICE O/P EST MOD 30 MIN: CPT | Performed by: ORTHOPAEDIC SURGERY

## 2024-05-28 RX ORDER — BLOOD SUGAR DIAGNOSTIC
STRIP MISCELLANEOUS
COMMUNITY
Start: 2019-11-15

## 2024-05-28 RX ORDER — DOXYCYCLINE HYCLATE 100 MG/1
100 CAPSULE ORAL 2 TIMES DAILY
COMMUNITY
Start: 2024-05-13

## 2024-05-28 NOTE — PROGRESS NOTES
Pressure Father     COPD Father     Emphysema Father     Elevated Lipids Sister         hypertriglyceridemia    Thyroid Cancer Sister     Asthma Brother     Sleep Apnea Brother     Depression Brother     Anxiety Disorder Brother     Other Maternal Grandfather         murdered    Stroke Paternal Grandmother     Heart Disease Paternal Grandfather        REVIEW OF SYSTEMS: Full ROS noted & scanned   CONSTITUTIONAL: Denies unexplained weight loss, fevers, chills or fatigue  NEUROLOGICAL: Denies unsteady gait or progressive weakness  MUSCULOSKELETAL: Denies joint swelling or redness  PSYCHOLOGICAL: Denies anxiety, depression   SKIN: Denies skin changes, delayed healing, rash, itching   HEMATOLOGIC: Denies easy bleeding or bruising  ENDOCRINE: Denies excessive thirst, urination, heat/cold  RESPIRATORY: Denies current dyspnea, cough  GI: Denies nausea, vomiting, diarrhea   : Denies bowel or bladder issues      PHYSICAL EXAM:    Vitals: Last menstrual period 04/29/2024.    GENERAL EXAM:  General Apparence: Patient is adequately groomed with no evidence of malnutrition.  Orientation: The patient is oriented to time, place and person.   Mood & Affect:The patient's mood and affect are appropriate.  Vascular: Examination reveals no swelling tenderness in upper or lower extremities. Good capillary refill.  Lymphatic: The lymphatic examination bilaterally reveals all areas to be without enlargement or induration  Sensation: Sensation is intact without deficit  Coordination/Balance: Good coordination     LUMBAR/SACRAL EXAMINATION:  Inspection: Local inspection shows no step-off or bruising.  Lumbar alignment is normal.  Sagittal and Coronal balance is neutral.      Palpation:   No evidence of tenderness at the midline.  No tenderness bilaterally at the paraspinal or trochanters.  There is no step-off or paraspinal spasm.   Range of Motion: Lumbar flexion, extension and rotation are mildly limited due to pain.  Strength:

## 2024-06-12 RX ORDER — MELOXICAM 15 MG/1
15 TABLET ORAL DAILY PRN
Qty: 30 TABLET | Refills: 0 | Status: SHIPPED | OUTPATIENT
Start: 2024-06-12 | End: 2024-07-12

## 2024-06-12 NOTE — TELEPHONE ENCOUNTER
Patient last seen 2024 and medication last filled 2024:    Dr. Peñaloza OV: 2024    Impression:  1) 7.5mo left lumbar radiculitis > chronic LBP  2) Moderate Left L5-S1 HNP  3) S/P L4-5 MLL, Higuera  w/recent sx eval  4) S/P L L5-S1 IL JETT - w/out relief  5) H/o brain cyst--being following with Kalen  6) Psoriatic arthritis         Plan:   1) We had a long discussion.  We discussed conservative versus surgical care options.  She has elected to pursue treatment course includin) Left L5 transforaminal JETT #2.  Procedure risk and benefits were discussed.  Pamphlet provided for more information.  Will order with STEFAN.  She did undergo a interlaminar route injection in January without benefit, will order TF route.   3) Surgical consult with Dr. Peñaloza  4) Mobic 15mg #30 i po qd PRN; informed to d/c other NSAIDs during. Take with food.  Side effect/risk discussed.  GI precaution.   5) Cont PT/HEP, proper ergonomics  6) Discussed concerning s/sx  7) F/u 2wks after JETT--discussed updated lumbar MRI W&WO if no improvement               Haylee Cruz PA-C, MPAS  Board Certified by the [facility]

## 2024-06-19 ENCOUNTER — TELEPHONE (OUTPATIENT)
Dept: ORTHOPEDIC SURGERY | Age: 44
End: 2024-06-19

## 2024-06-19 NOTE — TELEPHONE ENCOUNTER
----- Message from Edenilson Peñaloza MD sent at 6/14/2024  8:38 AM EDT -----  zachary Hobson or Amina  ----- Message -----  From: Kerline Newsome MA  Sent: 6/13/2024   3:10 PM EDT  To: Edenilson Peñaloza MD    We dont take her ins going to refer her somewhere else. She has [facility] [employer] [facility].. is there anyone you prefer to send her to.?

## 2024-06-25 ENCOUNTER — TELEPHONE (OUTPATIENT)
Dept: ORTHOPEDIC SURGERY | Age: 44
End: 2024-06-25

## 2024-06-25 NOTE — TELEPHONE ENCOUNTER
[facility] cancelled due to insurance, tried to call her several times and she never returned call. Tried to refer her to differed doctor for treatment that was in her network.

## 2024-07-11 ENCOUNTER — OFFICE VISIT (OUTPATIENT)
Dept: ENDOCRINOLOGY | Age: 44
End: 2024-07-11
Payer: COMMERCIAL

## 2024-07-11 VITALS
HEIGHT: 64 IN | SYSTOLIC BLOOD PRESSURE: 161 MMHG | OXYGEN SATURATION: 98 % | DIASTOLIC BLOOD PRESSURE: 101 MMHG | HEART RATE: 77 BPM | WEIGHT: 293 LBS | BODY MASS INDEX: 50.02 KG/M2

## 2024-07-11 DIAGNOSIS — E28.2 PCOS (POLYCYSTIC OVARIAN SYNDROME): Primary | ICD-10-CM

## 2024-07-11 DIAGNOSIS — L68.0 HIRSUTISM: ICD-10-CM

## 2024-07-11 DIAGNOSIS — E66.01 MORBID OBESITY DUE TO EXCESS CALORIES (HCC): ICD-10-CM

## 2024-07-11 PROCEDURE — 99214 OFFICE O/P EST MOD 30 MIN: CPT | Performed by: INTERNAL MEDICINE

## 2024-07-11 PROCEDURE — 3080F DIAST BP >= 90 MM HG: CPT | Performed by: INTERNAL MEDICINE

## 2024-07-11 PROCEDURE — 3077F SYST BP >= 140 MM HG: CPT | Performed by: INTERNAL MEDICINE

## 2024-07-11 NOTE — PROGRESS NOTES
02/21/2024 0.53  0.00 - 1.30 k/uL Final    Eosinophils Absolute 02/21/2024 0.01  0.00 - 0.60 k/uL Final    Basophils Absolute 02/21/2024 0.02  0.00 - 0.20 k/uL Final    Immature Granulocytes Absolute 02/21/2024 0.03  0.00 - 0.50 k/uL Final    Sodium 02/21/2024 136  136 - 145 mmol/L Final    Potassium 02/21/2024 4.3  3.5 - 5.1 mmol/L Final    Chloride 02/21/2024 102  99 - 110 mmol/L Final    CO2 02/21/2024 25  21 - 32 mmol/L Final    Anion Gap 02/21/2024 9  3 - 16 mmol/L Final    Glucose 02/21/2024 136 (H)  70 - 99 mg/dL Final    BUN 02/21/2024 15  7 - 20 mg/dL Final    Creatinine 02/21/2024 0.9  0.5 - 1.0 mg/dL Final    Est, Glom Filt Rate 02/21/2024 >60  >60 mL/min/1.73m2 Final    Calcium 02/21/2024 9.3  8.3 - 10.6 mg/dL Final    Total Protein 02/21/2024 7.3  6.4 - 8.2 g/dL Final    Albumin 02/21/2024 3.8  3.4 - 5.0 g/dL Final    Albumin/Globulin Ratio 02/21/2024 1.1   Final    Total Bilirubin 02/21/2024 <0.2  0.0 - 1.0 mg/dL Final    Alkaline Phosphatase 02/21/2024 86  40 - 129 U/L Final    ALT 02/21/2024 16  10 - 40 U/L Final    AST 02/21/2024 21  15 - 37 U/L Final    Ventricular Rate 02/21/2024 82  BPM Final    Atrial Rate 02/21/2024 82  BPM Final    P-R Interval 02/21/2024 178  ms Final    QRS Duration 02/21/2024 70  ms Final    Q-T Interval 02/21/2024 352  ms Final    QTc Calculation (Bazett) 02/21/2024 411  ms Final    P Axis 02/21/2024 69  degrees Final    R Axis 02/21/2024 74  degrees Final    T Axis 02/21/2024 35  degrees Final    Diagnosis 02/21/2024 Normal sinus rhythmNormal ECGConfirmed by HUMAIRA JIMENEZ MD ([id number]) on 2/23/2024 7:57:34 AM   Final    Pro-BNP 02/21/2024 248 (H)  0 - 124 pg/mL Final    D-Dimer, Quant 02/21/2024 0.27  0.00 - 0.60 ug/mL FEU Final   Orders Only on 01/11/2024   Component Date Value Ref Range Status    Testosterone 01/11/2024 17 (L)  20 - 70 ng/dL Final    Sex Hormone Binding 01/11/2024 71  30 - 135 nmol/L Final    Testosterone, Free 01/11/2024 1.8  1.1 - 5.8 pg/mL Final

## 2024-07-25 RX ORDER — MELOXICAM 15 MG/1
15 TABLET ORAL DAILY PRN
Qty: 30 TABLET | Refills: 0 | Status: SHIPPED | OUTPATIENT
Start: 2024-07-25 | End: 2024-08-24

## 2024-07-25 NOTE — TELEPHONE ENCOUNTER
Patient last seen 2024 and medication last filled 2024:    INSURANCE DENIED LESI        Impression:  1) 7.5mo left lumbar radiculitis > chronic LBP  2) Moderate Left L5-S1 HNP  3) S/P L4-5 MLL, Higuera  w/recent sx eval  4) S/P L L5-S1 IL JETT - w/out relief  5) H/o brain cyst--being following with Kalen  6) Psoriatic arthritis         Plan:   1) We had a long discussion.  We discussed conservative versus surgical care options.  She has elected to pursue treatment course includin) Left L5 transforaminal JETT #2.  Procedure risk and benefits were discussed.  Pamphlet provided for more information.  Will order with STEFAN.  She did undergo a interlaminar route injection in January without benefit, will order TF route.   3) Surgical consult with Dr. Peñaloza  4) Mobic 15mg #30 i po qd PRN; informed to d/c other NSAIDs during. Take with food.  Side effect/risk discussed.  GI precaution.   5) Cont PT/HEP, proper ergonomics  6) Discussed concerning s/sx  7) F/u 2wks after JETT--discussed updated lumbar MRI W&WO if no improvement               Haylee Cruz PA-C, MPAS  Board Certified by the [facility]

## 2024-08-02 LAB — DIABETIC RETINOPATHY: NEGATIVE

## 2024-08-03 ENCOUNTER — PATIENT MESSAGE (OUTPATIENT)
Dept: PRIMARY CARE CLINIC | Age: 44
End: 2024-08-03

## 2024-08-06 NOTE — TELEPHONE ENCOUNTER
Spoke to pt. She has not had a chance to check her e-mails. She will check it and call the office back.

## 2024-08-06 NOTE — TELEPHONE ENCOUNTER
From: Krystle Pope  To: Dr. Brad Brunner  Sent: 8/3/2024 11:45 AM EDT  Subject: long-term Paperwork     Hello, from my medical [facility], I have to submit paperwork for renewal. They are asking each of my Doctors to submit my medical records and any testing from my past 3 office visits. The records need to be signed by MD or DO and faxed to [phone]. If possible, please submit them as soon as possible and if I need to sign anything, please let me know.    Greatly Appreciated   Krystle Pope

## 2024-08-09 ENCOUNTER — TELEPHONE (OUTPATIENT)
Dept: PRIMARY CARE CLINIC | Age: 44
End: 2024-08-09

## 2024-08-09 NOTE — TELEPHONE ENCOUNTER
[facility] This pt's insurance is out of network with Dr. Brunner's office. Previously made pt aware of this after cancelling the 7/15/24 appointment she had.  Checking to see if she established with another PCP?

## 2024-08-11 ENCOUNTER — PATIENT MESSAGE (OUTPATIENT)
Dept: ENT CLINIC | Age: 44
End: 2024-08-11

## 2024-08-13 NOTE — TELEPHONE ENCOUNTER
Pt will sign record release in person when she goes to the other [facility] office.    No further action required.

## 2024-08-27 RX ORDER — MELOXICAM 15 MG/1
15 TABLET ORAL DAILY PRN
Qty: 30 TABLET | Refills: 0 | Status: SHIPPED | OUTPATIENT
Start: 2024-08-27

## 2024-08-27 NOTE — TELEPHONE ENCOUNTER
Patient last seen 2024 and medication last filled 2024:    Impression:  1) 7.5mo left lumbar radiculitis > chronic LBP  2) Moderate Left L5-S1 HNP  3) S/P L4-5 MLL, Kalen  w/recent sx eval  4) S/P L L5-S1 IL JETT - w/out relief  5) H/o brain cyst--being following with Kalen  6) Psoriatic arthritis         Plan:   1) We had a long discussion.  We discussed conservative versus surgical care options.  She has elected to pursue treatment course includin) Left L5 transforaminal JETT #2.  Procedure risk and benefits were discussed.  Pamphlet provided for more information.  Will order with STEFAN.  She did undergo a interlaminar route injection in January without benefit, will order TF route.   3) Surgical consult with Dr. Peñaloza  4) Mobic 15mg #30 i po qd PRN; informed to d/c other NSAIDs during. Take with food.  Side effect/risk discussed.  GI precaution.   5) Cont PT/HEP, proper ergonomics  6) Discussed concerning s/sx  7) F/u 2wks after JETT--discussed updated lumbar MRI W&WO if no improvement               Haylee Cruz PA-C, MPAS  Board Certified by the [facility]

## 2024-09-06 ENCOUNTER — OFFICE VISIT (OUTPATIENT)
Age: 44
End: 2024-09-06
Payer: COMMERCIAL

## 2024-09-06 ENCOUNTER — PROCEDURE VISIT (OUTPATIENT)
Age: 44
End: 2024-09-06

## 2024-09-06 VITALS
TEMPERATURE: 97 F | OXYGEN SATURATION: 98 % | SYSTOLIC BLOOD PRESSURE: 151 MMHG | HEART RATE: 82 BPM | DIASTOLIC BLOOD PRESSURE: 90 MMHG

## 2024-09-06 DIAGNOSIS — H65.491 CHRONIC MEE (MIDDLE EAR EFFUSION), RIGHT: Primary | ICD-10-CM

## 2024-09-06 DIAGNOSIS — H90.11 CONDUCTIVE HEARING LOSS OF RIGHT EAR WITH UNRESTRICTED HEARING OF LEFT EAR: ICD-10-CM

## 2024-09-06 DIAGNOSIS — H90.11 CONDUCTIVE HEARING LOSS OF RIGHT EAR WITH UNRESTRICTED HEARING OF LEFT EAR: Primary | ICD-10-CM

## 2024-09-06 DIAGNOSIS — H93.13 TINNITUS, BILATERAL: ICD-10-CM

## 2024-09-06 PROCEDURE — 69433 CREATE EARDRUM OPENING: CPT | Performed by: STUDENT IN AN ORGANIZED HEALTH CARE EDUCATION/TRAINING PROGRAM

## 2024-09-06 RX ORDER — OFLOXACIN 3 MG/ML
5 SOLUTION AURICULAR (OTIC) 2 TIMES DAILY
Qty: 10 ML | Refills: 0 | Status: SHIPPED | OUTPATIENT
Start: 2024-09-06 | End: 2024-09-11

## 2024-09-06 NOTE — PROGRESS NOTES
Krystle Pope   1980, 43 y.o. female   [id number]       Referring Provider: Jorje Hernandez DO  Referral Type: In an order in Epic    Reason for Visit: Evaluation of the cause of disorders of hearing, tinnitus, or balance.    ADULT AUDIOLOGIC EVALUATION      Krystle Pope is a 43 y.o. female seen today, 9/6/2024 , for a recheck audiologic evaluation.  Patient was seen by Jorje Hernandez DO following today's evaluation. 10/11/22    AUDIOLOGIC AND OTHER PERTINENT MEDICAL HISTORY:      Krystle Pope noted decreased hearing and fullness in the right ear. Intermittent popping in left ear. Patient reports history of pressure equalizing (PE) tubes and bilateral tinnitus. No additional significant otologic or medical history was reported.     Krystle Pope denied dizziness, imbalance, history of falls, history of occupational/recreational noise exposure, history of head trauma, history of ear surgery, and family history of hearing loss.    Date: 9/6/2024     IMPRESSIONS:      Today's results revealed a symmetric sensorineural hearing loss with excellent word recognition, bilaterally. Air-bone gaps > 10 dBHL noted from 500-4000Hz in the right ear. Tympanometry revealed flat, no peak pressure tympanogram consistent with middle ear pathology of the right ear. Follow medical recommendations of Jorje Hernandez DO.    ASSESSMENT AND FINDINGS:     Otoscopy revealed: Clear ear canals bilaterally    RIGHT EAR:  Hearing Sensitivity: Mild to moderate conductive hearing loss.   Speech Recognition Threshold: 40 dB HL  Word Recognition: Excellent 100%, based on NU-6 Ordered-by-Difficulty 10-word list at 70 dBHL masked using recorded speech stimuli.    Tympanometry: Flat, no peak pressure or compliance, Type B tympanogram, with typical ear canal volume, consistent with middle ear fluid.      LEFT EAR:  Hearing Sensitivity:Hearing sensitivity within normal limits from 250-8000 Hz  Speech Recognition Threshold: 10 dB

## 2024-09-10 DIAGNOSIS — F41.9 ANXIETY AND DEPRESSION: ICD-10-CM

## 2024-09-10 DIAGNOSIS — F32.A ANXIETY AND DEPRESSION: ICD-10-CM

## 2024-09-10 RX ORDER — BUSPIRONE HYDROCHLORIDE 15 MG/1
TABLET ORAL
Qty: 60 TABLET | Refills: 3 | Status: SHIPPED | OUTPATIENT
Start: 2024-09-10

## 2024-09-10 RX ORDER — ESCITALOPRAM OXALATE 20 MG/1
TABLET ORAL
Qty: 30 TABLET | Refills: 3 | Status: SHIPPED | OUTPATIENT
Start: 2024-09-10

## 2024-09-25 ASSESSMENT — SLEEP AND FATIGUE QUESTIONNAIRES
HOW LIKELY ARE YOU TO NOD OFF OR FALL ASLEEP WHILE SITTING AND TALKING TO SOMEONE: WOULD NEVER DOZE
HOW LIKELY ARE YOU TO NOD OFF OR FALL ASLEEP IN A CAR, WHILE STOPPED FOR A FEW MINUTES IN TRAFFIC: WOULD NEVER DOZE
HOW LIKELY ARE YOU TO NOD OFF OR FALL ASLEEP WHILE LYING DOWN TO REST IN THE AFTERNOON WHEN CIRCUMSTANCES PERMIT: MODERATE CHANCE OF DOZING
HOW LIKELY ARE YOU TO NOD OFF OR FALL ASLEEP WHEN YOU ARE A PASSENGER IN A CAR FOR AN HOUR WITHOUT A BREAK: MODERATE CHANCE OF DOZING
HOW LIKELY ARE YOU TO NOD OFF OR FALL ASLEEP WHILE SITTING AND READING: SLIGHT CHANCE OF DOZING
HOW LIKELY ARE YOU TO NOD OFF OR FALL ASLEEP IN A CAR, WHILE STOPPED FOR A FEW MINUTES IN TRAFFIC: WOULD NEVER DOZE
HOW LIKELY ARE YOU TO NOD OFF OR FALL ASLEEP WHEN YOU ARE A PASSENGER IN A CAR FOR AN HOUR WITHOUT A BREAK: MODERATE CHANCE OF DOZING
HOW LIKELY ARE YOU TO NOD OFF OR FALL ASLEEP WHILE LYING DOWN TO REST IN THE AFTERNOON WHEN CIRCUMSTANCES PERMIT: MODERATE CHANCE OF DOZING
HOW LIKELY ARE YOU TO NOD OFF OR FALL ASLEEP WHILE SITTING AND TALKING TO SOMEONE: WOULD NEVER DOZE
HOW LIKELY ARE YOU TO NOD OFF OR FALL ASLEEP WHILE SITTING QUIETLY AFTER LUNCH WITHOUT ALCOHOL: WOULD NEVER DOZE
HOW LIKELY ARE YOU TO NOD OFF OR FALL ASLEEP WHILE SITTING AND READING: SLIGHT CHANCE OF DOZING
HOW LIKELY ARE YOU TO NOD OFF OR FALL ASLEEP WHILE SITTING INACTIVE IN A PUBLIC PLACE: WOULD NEVER DOZE
HOW LIKELY ARE YOU TO NOD OFF OR FALL ASLEEP WHILE WATCHING TV: MODERATE CHANCE OF DOZING
HOW LIKELY ARE YOU TO NOD OFF OR FALL ASLEEP WHILE WATCHING TV: MODERATE CHANCE OF DOZING
HOW LIKELY ARE YOU TO NOD OFF OR FALL ASLEEP WHILE SITTING INACTIVE IN A PUBLIC PLACE: WOULD NEVER DOZE
HOW LIKELY ARE YOU TO NOD OFF OR FALL ASLEEP WHILE SITTING QUIETLY AFTER LUNCH WITHOUT ALCOHOL: WOULD NEVER DOZE
ESS TOTAL SCORE: 7

## 2024-09-26 ENCOUNTER — OFFICE VISIT (OUTPATIENT)
Age: 44
End: 2024-09-26

## 2024-09-26 VITALS
HEART RATE: 78 BPM | SYSTOLIC BLOOD PRESSURE: 128 MMHG | OXYGEN SATURATION: 98 % | DIASTOLIC BLOOD PRESSURE: 76 MMHG | HEIGHT: 64 IN | BODY MASS INDEX: 50.02 KG/M2 | WEIGHT: 293 LBS

## 2024-09-26 DIAGNOSIS — G47.33 OBSTRUCTIVE SLEEP APNEA SYNDROME: Primary | Chronic | ICD-10-CM

## 2024-09-26 DIAGNOSIS — I10 PRIMARY HYPERTENSION: Chronic | ICD-10-CM

## 2024-09-26 DIAGNOSIS — E11.9 CONTROLLED TYPE 2 DIABETES MELLITUS WITHOUT COMPLICATION, WITHOUT LONG-TERM CURRENT USE OF INSULIN (HCC): Chronic | ICD-10-CM

## 2024-09-26 DIAGNOSIS — E66.01 MORBID OBESITY DUE TO EXCESS CALORIES: ICD-10-CM

## 2024-09-26 RX ORDER — KETOCONAZOLE 20 MG/G
CREAM TOPICAL
COMMUNITY

## 2024-09-26 RX ORDER — AMLODIPINE BESYLATE 10 MG/1
TABLET ORAL
COMMUNITY
Start: 2024-09-23

## 2024-09-30 RX ORDER — MELOXICAM 15 MG/1
15 TABLET ORAL DAILY PRN
Qty: 30 TABLET | Refills: 0 | Status: SHIPPED | OUTPATIENT
Start: 2024-09-30

## 2024-09-30 NOTE — TELEPHONE ENCOUNTER
Patient last seen 2024 and medication last filled 2024:    Impression:  1) 7.5mo left lumbar radiculitis > chronic LBP  2) Moderate Left L5-S1 HNP  3) S/P L4-5 MLL, Kal[facility]  w/recent sx eval  4) S/P L L5-S1 IL JETT - w/out relief  5) H/o brain cyst--being following with Kalen  6) Psoriatic arthritis         Plan:   1) We had a long discussion.  We discussed conservative versus surgical care options.  She has elected to pursue treatment course includin) Left L5 transforaminal JETT #2.  Procedure risk and benefits were discussed.  Pamphlet provided for more information.  Will order with STEFAN.  She did undergo a interlaminar route injection in January without benefit, will order TF route.   3) Surgical consult with Dr. Peñaloza  4) Mobic 15mg #30 i po qd PRN; informed to d/c other NSAIDs during. Take with food.  Side effect/risk discussed.  GI precaution.   5) Cont PT/HEP, proper ergonomics  6) Discussed concerning s/sx  7) F/u 2wks after JETT--discussed updated lumbar MRI W&WO if no improvement               Haylee Cruz PA-C, MPAS  Board Certified by the [facility]

## 2024-11-04 RX ORDER — MELOXICAM 15 MG/1
15 TABLET ORAL DAILY PRN
Qty: 30 TABLET | Refills: 0 | OUTPATIENT
Start: 2024-11-04

## 2024-11-05 DIAGNOSIS — E28.2 PCOS (POLYCYSTIC OVARIAN SYNDROME): ICD-10-CM

## 2024-11-05 DIAGNOSIS — L68.0 HIRSUTISM: ICD-10-CM

## 2024-11-05 DIAGNOSIS — E66.01 MORBID OBESITY DUE TO EXCESS CALORIES: ICD-10-CM

## 2024-11-05 LAB — PROLACTIN SERPL IA-MCNC: 18.1 NG/ML

## 2024-11-07 LAB
SHBG SERPL-SCNC: 43 NMOL/L (ref 25–122)
TESTOST FREE SERPL-MCNC: 2.7 PG/ML (ref 1.1–5.8)
TESTOST SERPL-MCNC: 18 NG/DL (ref 8–48)

## 2025-07-01 ENCOUNTER — APPOINTMENT (OUTPATIENT)
Age: 45
End: 2025-07-01
Payer: COMMERCIAL

## 2025-07-01 ENCOUNTER — HOSPITAL ENCOUNTER (INPATIENT)
Age: 45
LOS: 4 days | Discharge: HOME | End: 2025-07-05
Attending: HOSPITALIST | Admitting: HOSPITALIST
Payer: COMMERCIAL

## 2025-07-01 DIAGNOSIS — L03.319 CELLULITIS OF PUBIC REGION: Primary | ICD-10-CM

## 2025-07-01 PROBLEM — L03.90 CELLULITIS: Status: ACTIVE | Noted: 2025-07-01

## 2025-07-01 LAB
ALBUMIN SERPL-MCNC: 4.3 G/DL (ref 3.4–5)
ALBUMIN/GLOB SERPL: 1.1 {RATIO}
ALP SERPL-CCNC: 98 U/L (ref 40–129)
ALT SERPL-CCNC: 30 U/L (ref 10–40)
ANION GAP SERPL CALCULATED.3IONS-SCNC: 15 MMOL/L (ref 3–16)
AST SERPL-CCNC: 30 U/L (ref 15–37)
BASOPHILS # BLD: 0.07 K/UL (ref 0–0.2)
BASOPHILS NFR BLD: 1 %
BILIRUB SERPL-MCNC: 0.3 MG/DL (ref 0–1)
BUN SERPL-MCNC: 10 MG/DL (ref 7–20)
CALCIUM SERPL-MCNC: 9.8 MG/DL (ref 8.3–10.6)
CHLORIDE SERPL-SCNC: 100 MMOL/L (ref 99–110)
CO2 SERPL-SCNC: 22 MMOL/L (ref 21–32)
CREAT SERPL-MCNC: 1 MG/DL (ref 0.5–1)
EOSINOPHIL # BLD: 0.41 K/UL (ref 0–0.6)
EOSINOPHILS RELATIVE PERCENT: 3 %
ERYTHROCYTE [DISTWIDTH] IN BLOOD BY AUTOMATED COUNT: 13.4 % (ref 12.4–15.4)
GFR, ESTIMATED: 74 ML/MIN/1.73M2
GLUCOSE SERPL-MCNC: 149 MG/DL (ref 70–99)
HCG SERPL QL: NEGATIVE
HCT VFR BLD AUTO: 46.1 % (ref 36–48)
HGB BLD-MCNC: 14.8 G/DL (ref 12–16)
IMM GRANULOCYTES # BLD AUTO: 0.18 K/UL (ref 0–0.5)
IMM GRANULOCYTES NFR BLD: 1 %
LACTATE BLDV-SCNC: 1.5 MMOL/L (ref 0.4–1.9)
LYMPHOCYTES NFR BLD: 3.5 K/UL (ref 1–5.1)
LYMPHOCYTES RELATIVE PERCENT: 25 %
MCH RBC QN AUTO: 30.9 PG (ref 26–34)
MCHC RBC AUTO-ENTMCNC: 32.1 G/DL (ref 31–36)
MCV RBC AUTO: 96.2 FL (ref 80–100)
MONOCYTES NFR BLD: 0.85 K/UL (ref 0–1.3)
MONOCYTES NFR BLD: 6 %
NEUTROPHILS NFR BLD: 65 %
NEUTS SEG NFR BLD: 9.11 K/UL (ref 1.7–7.7)
PLATELET # BLD AUTO: 368 K/UL (ref 135–450)
PMV BLD AUTO: 10.1 FL (ref 9.4–12.4)
POTASSIUM SERPL-SCNC: 4.1 MMOL/L (ref 3.5–5.1)
PROT SERPL-MCNC: 8 G/DL (ref 6.4–8.2)
RBC # BLD AUTO: 4.79 M/UL (ref 4–5.2)
SODIUM SERPL-SCNC: 136 MMOL/L (ref 136–145)
WBC OTHER # BLD: 14.1 K/UL (ref 4–11)

## 2025-07-01 PROCEDURE — 72193 CT PELVIS W/DYE: CPT

## 2025-07-01 PROCEDURE — 6360000004 HC RX CONTRAST MEDICATION: Performed by: PHYSICIAN ASSISTANT

## 2025-07-01 PROCEDURE — 83605 ASSAY OF LACTIC ACID: CPT

## 2025-07-01 PROCEDURE — 6360000002 HC RX W HCPCS: Performed by: HOSPITALIST

## 2025-07-01 PROCEDURE — 96365 THER/PROPH/DIAG IV INF INIT: CPT

## 2025-07-01 PROCEDURE — 80053 COMPREHEN METABOLIC PANEL: CPT

## 2025-07-01 PROCEDURE — 99291 CRITICAL CARE FIRST HOUR: CPT

## 2025-07-01 PROCEDURE — 1200000000 HC SEMI PRIVATE

## 2025-07-01 PROCEDURE — 36415 COLL VENOUS BLD VENIPUNCTURE: CPT

## 2025-07-01 PROCEDURE — 2580000003 HC RX 258: Performed by: PHYSICIAN ASSISTANT

## 2025-07-01 PROCEDURE — 85025 COMPLETE CBC W/AUTO DIFF WBC: CPT

## 2025-07-01 PROCEDURE — 87040 BLOOD CULTURE FOR BACTERIA: CPT

## 2025-07-01 PROCEDURE — 84703 CHORIONIC GONADOTROPIN ASSAY: CPT

## 2025-07-01 PROCEDURE — 6360000002 HC RX W HCPCS: Performed by: PHYSICIAN ASSISTANT

## 2025-07-01 PROCEDURE — 96375 TX/PRO/DX INJ NEW DRUG ADDON: CPT

## 2025-07-01 PROCEDURE — 6370000000 HC RX 637 (ALT 250 FOR IP): Performed by: HOSPITALIST

## 2025-07-01 PROCEDURE — 2580000003 HC RX 258: Performed by: HOSPITALIST

## 2025-07-01 RX ORDER — IOPAMIDOL 755 MG/ML
75 INJECTION, SOLUTION INTRAVASCULAR
Status: COMPLETED | OUTPATIENT
Start: 2025-07-01 | End: 2025-07-01

## 2025-07-01 RX ORDER — SODIUM CHLORIDE 0.9 % (FLUSH) 0.9 %
5-40 SYRINGE (ML) INJECTION EVERY 12 HOURS SCHEDULED
Status: DISCONTINUED | OUTPATIENT
Start: 2025-07-01 | End: 2025-07-05 | Stop reason: HOSPADM

## 2025-07-01 RX ORDER — BUPROPION HYDROCHLORIDE 150 MG/1
150 TABLET, EXTENDED RELEASE ORAL 2 TIMES DAILY
COMMUNITY

## 2025-07-01 RX ORDER — POTASSIUM CHLORIDE 7.45 MG/ML
10 INJECTION INTRAVENOUS PRN
Status: DISCONTINUED | OUTPATIENT
Start: 2025-07-01 | End: 2025-07-05 | Stop reason: HOSPADM

## 2025-07-01 RX ORDER — POLYETHYLENE GLYCOL 3350 17 G/17G
17 POWDER, FOR SOLUTION ORAL DAILY
Status: DISCONTINUED | OUTPATIENT
Start: 2025-07-01 | End: 2025-07-05 | Stop reason: HOSPADM

## 2025-07-01 RX ORDER — SPIRONOLACTONE 25 MG/1
100 TABLET ORAL 2 TIMES DAILY
Status: DISCONTINUED | OUTPATIENT
Start: 2025-07-01 | End: 2025-07-05 | Stop reason: HOSPADM

## 2025-07-01 RX ORDER — 0.9 % SODIUM CHLORIDE 0.9 %
1000 INTRAVENOUS SOLUTION INTRAVENOUS ONCE
Status: COMPLETED | OUTPATIENT
Start: 2025-07-01 | End: 2025-07-01

## 2025-07-01 RX ORDER — SODIUM CHLORIDE 9 MG/ML
INJECTION, SOLUTION INTRAVENOUS CONTINUOUS
Status: DISCONTINUED | OUTPATIENT
Start: 2025-07-01 | End: 2025-07-05 | Stop reason: HOSPADM

## 2025-07-01 RX ORDER — NALTREXONE HYDROCHLORIDE 50 MG/1
25 TABLET, FILM COATED ORAL 2 TIMES DAILY
Status: ON HOLD | COMMUNITY
End: 2025-07-02 | Stop reason: ALTCHOICE

## 2025-07-01 RX ORDER — CETIRIZINE HYDROCHLORIDE 10 MG/1
10 TABLET ORAL DAILY
Status: DISCONTINUED | OUTPATIENT
Start: 2025-07-02 | End: 2025-07-05 | Stop reason: HOSPADM

## 2025-07-01 RX ORDER — BUSPIRONE HYDROCHLORIDE 5 MG/1
15 TABLET ORAL 2 TIMES DAILY
Status: DISCONTINUED | OUTPATIENT
Start: 2025-07-01 | End: 2025-07-05 | Stop reason: HOSPADM

## 2025-07-01 RX ORDER — POLYETHYLENE GLYCOL 3350 17 G/17G
17 POWDER, FOR SOLUTION ORAL DAILY
Status: DISCONTINUED | OUTPATIENT
Start: 2025-07-01 | End: 2025-07-01 | Stop reason: SDUPTHER

## 2025-07-01 RX ORDER — AMLODIPINE BESYLATE 5 MG/1
10 TABLET ORAL NIGHTLY
Status: DISCONTINUED | OUTPATIENT
Start: 2025-07-01 | End: 2025-07-05 | Stop reason: HOSPADM

## 2025-07-01 RX ORDER — ONDANSETRON 2 MG/ML
4 INJECTION INTRAMUSCULAR; INTRAVENOUS EVERY 6 HOURS PRN
Status: DISCONTINUED | OUTPATIENT
Start: 2025-07-01 | End: 2025-07-05 | Stop reason: HOSPADM

## 2025-07-01 RX ORDER — ONDANSETRON 4 MG/1
4 TABLET, ORALLY DISINTEGRATING ORAL EVERY 8 HOURS PRN
Status: DISCONTINUED | OUTPATIENT
Start: 2025-07-01 | End: 2025-07-01

## 2025-07-01 RX ORDER — SODIUM CHLORIDE 0.9 % (FLUSH) 0.9 %
10 SYRINGE (ML) INJECTION PRN
Status: DISCONTINUED | OUTPATIENT
Start: 2025-07-01 | End: 2025-07-05 | Stop reason: HOSPADM

## 2025-07-01 RX ORDER — ACETAMINOPHEN 325 MG/1
650 TABLET ORAL EVERY 6 HOURS PRN
Status: DISCONTINUED | OUTPATIENT
Start: 2025-07-01 | End: 2025-07-05 | Stop reason: HOSPADM

## 2025-07-01 RX ORDER — DOXYCYCLINE HYCLATE 50 MG/1
100 CAPSULE ORAL 2 TIMES DAILY
COMMUNITY
Start: 2025-06-25 | End: 2025-07-19

## 2025-07-01 RX ORDER — ACETAMINOPHEN 650 MG/1
650 SUPPOSITORY RECTAL EVERY 6 HOURS PRN
Status: DISCONTINUED | OUTPATIENT
Start: 2025-07-01 | End: 2025-07-05 | Stop reason: HOSPADM

## 2025-07-01 RX ORDER — POLYETHYLENE GLYCOL 3350 17 G/17G
17 POWDER, FOR SOLUTION ORAL DAILY PRN
Status: DISCONTINUED | OUTPATIENT
Start: 2025-07-01 | End: 2025-07-01

## 2025-07-01 RX ORDER — SODIUM CHLORIDE 9 MG/ML
INJECTION, SOLUTION INTRAVENOUS PRN
Status: DISCONTINUED | OUTPATIENT
Start: 2025-07-01 | End: 2025-07-05 | Stop reason: HOSPADM

## 2025-07-01 RX ORDER — POTASSIUM CHLORIDE 1500 MG/1
40 TABLET, EXTENDED RELEASE ORAL PRN
Status: DISCONTINUED | OUTPATIENT
Start: 2025-07-01 | End: 2025-07-05 | Stop reason: HOSPADM

## 2025-07-01 RX ORDER — ZINC SULFATE 50(220)MG
50 CAPSULE ORAL DAILY
Status: DISCONTINUED | OUTPATIENT
Start: 2025-07-01 | End: 2025-07-05 | Stop reason: HOSPADM

## 2025-07-01 RX ORDER — ERGOCALCIFEROL 1.25 MG/1
50000 CAPSULE, LIQUID FILLED ORAL WEEKLY
Status: DISCONTINUED | OUTPATIENT
Start: 2025-07-04 | End: 2025-07-05 | Stop reason: HOSPADM

## 2025-07-01 RX ORDER — MORPHINE SULFATE 4 MG/ML
4 INJECTION, SOLUTION INTRAMUSCULAR; INTRAVENOUS ONCE
Refills: 0 | Status: COMPLETED | OUTPATIENT
Start: 2025-07-01 | End: 2025-07-01

## 2025-07-01 RX ORDER — PANTOPRAZOLE SODIUM 40 MG/1
40 TABLET, DELAYED RELEASE ORAL
Status: DISCONTINUED | OUTPATIENT
Start: 2025-07-02 | End: 2025-07-05 | Stop reason: HOSPADM

## 2025-07-01 RX ORDER — OXYCODONE AND ACETAMINOPHEN 5; 325 MG/1; MG/1
1 TABLET ORAL EVERY 4 HOURS PRN
Refills: 0 | Status: DISCONTINUED | OUTPATIENT
Start: 2025-07-01 | End: 2025-07-02

## 2025-07-01 RX ORDER — ENOXAPARIN SODIUM 100 MG/ML
30 INJECTION SUBCUTANEOUS 2 TIMES DAILY
Status: DISCONTINUED | OUTPATIENT
Start: 2025-07-01 | End: 2025-07-05 | Stop reason: HOSPADM

## 2025-07-01 RX ORDER — BUPROPION HYDROCHLORIDE 150 MG/1
150 TABLET, EXTENDED RELEASE ORAL 2 TIMES DAILY
Status: DISCONTINUED | OUTPATIENT
Start: 2025-07-01 | End: 2025-07-05 | Stop reason: HOSPADM

## 2025-07-01 RX ORDER — ONDANSETRON 4 MG/1
4 TABLET, ORALLY DISINTEGRATING ORAL EVERY 8 HOURS PRN
Status: DISCONTINUED | OUTPATIENT
Start: 2025-07-01 | End: 2025-07-05 | Stop reason: HOSPADM

## 2025-07-01 RX ORDER — ERGOCALCIFEROL 1.25 MG/1
50000 CAPSULE ORAL WEEKLY
Status: ON HOLD | COMMUNITY
End: 2025-07-02 | Stop reason: ALTCHOICE

## 2025-07-01 RX ORDER — ONDANSETRON 2 MG/ML
4 INJECTION INTRAMUSCULAR; INTRAVENOUS ONCE
Status: COMPLETED | OUTPATIENT
Start: 2025-07-01 | End: 2025-07-01

## 2025-07-01 RX ORDER — ESCITALOPRAM OXALATE 10 MG/1
20 TABLET ORAL DAILY
Status: DISCONTINUED | OUTPATIENT
Start: 2025-07-02 | End: 2025-07-05 | Stop reason: HOSPADM

## 2025-07-01 RX ADMIN — ONDANSETRON 4 MG: 2 INJECTION, SOLUTION INTRAMUSCULAR; INTRAVENOUS at 15:23

## 2025-07-01 RX ADMIN — IOPAMIDOL 75 ML: 755 INJECTION, SOLUTION INTRAVENOUS at 16:11

## 2025-07-01 RX ADMIN — MORPHINE SULFATE 4 MG: 4 INJECTION, SOLUTION INTRAMUSCULAR; INTRAVENOUS at 15:23

## 2025-07-01 RX ADMIN — SODIUM CHLORIDE: 0.9 INJECTION, SOLUTION INTRAVENOUS at 19:49

## 2025-07-01 RX ADMIN — SPIRONOLACTONE 100 MG: 25 TABLET ORAL at 20:37

## 2025-07-01 RX ADMIN — SODIUM CHLORIDE 1000 ML: 0.9 INJECTION, SOLUTION INTRAVENOUS at 15:23

## 2025-07-01 RX ADMIN — AMLODIPINE BESYLATE 10 MG: 5 TABLET ORAL at 20:36

## 2025-07-01 RX ADMIN — ENOXAPARIN SODIUM 30 MG: 100 INJECTION SUBCUTANEOUS at 20:37

## 2025-07-01 RX ADMIN — OXYCODONE HYDROCHLORIDE AND ACETAMINOPHEN 1 TABLET: 5; 325 TABLET ORAL at 19:45

## 2025-07-01 RX ADMIN — PIPERACILLIN AND TAZOBACTAM 4500 MG: 4; .5 INJECTION, POWDER, FOR SOLUTION INTRAVENOUS at 15:32

## 2025-07-01 RX ADMIN — BUPROPION HYDROCHLORIDE 150 MG: 150 TABLET, FILM COATED ORAL at 20:36

## 2025-07-01 RX ADMIN — BUSPIRONE HYDROCHLORIDE 15 MG: 5 TABLET ORAL at 20:36

## 2025-07-01 ASSESSMENT — PAIN DESCRIPTION - FREQUENCY
FREQUENCY: CONTINUOUS

## 2025-07-01 ASSESSMENT — PAIN DESCRIPTION - PAIN TYPE
TYPE: ACUTE PAIN

## 2025-07-01 ASSESSMENT — LIFESTYLE VARIABLES
HOW OFTEN DO YOU HAVE A DRINK CONTAINING ALCOHOL: NEVER
HOW MANY STANDARD DRINKS CONTAINING ALCOHOL DO YOU HAVE ON A TYPICAL DAY: PATIENT DOES NOT DRINK

## 2025-07-01 ASSESSMENT — PAIN SCALES - GENERAL
PAINLEVEL_OUTOF10: 7
PAINLEVEL_OUTOF10: 6
PAINLEVEL_OUTOF10: 7
PAINLEVEL_OUTOF10: 8
PAINLEVEL_OUTOF10: 5
PAINLEVEL_OUTOF10: 0
PAINLEVEL_OUTOF10: 2

## 2025-07-01 ASSESSMENT — PAIN DESCRIPTION - DESCRIPTORS
DESCRIPTORS: ACHING;DULL
DESCRIPTORS: DULL;PRESSURE
DESCRIPTORS: ACHING;DULL
DESCRIPTORS: DISCOMFORT
DESCRIPTORS: DISCOMFORT

## 2025-07-01 ASSESSMENT — PAIN - FUNCTIONAL ASSESSMENT
PAIN_FUNCTIONAL_ASSESSMENT: 0-10
PAIN_FUNCTIONAL_ASSESSMENT: ACTIVITIES ARE NOT PREVENTED

## 2025-07-01 ASSESSMENT — ENCOUNTER SYMPTOMS
WHEEZING: 0
DIARRHEA: 0
ABDOMINAL PAIN: 0
COLOR CHANGE: 1
SHORTNESS OF BREATH: 0
VOMITING: 1
NAUSEA: 1
RHINORRHEA: 0
COUGH: 0

## 2025-07-01 ASSESSMENT — PAIN DESCRIPTION - LOCATION
LOCATION: ABDOMEN
LOCATION: ABDOMEN;GROIN
LOCATION: ABDOMEN;GROIN
LOCATION: GROIN
LOCATION: GROIN
LOCATION: ABDOMEN;GROIN

## 2025-07-01 ASSESSMENT — PAIN DESCRIPTION - ORIENTATION
ORIENTATION: LEFT;LOWER
ORIENTATION: LEFT

## 2025-07-01 ASSESSMENT — PAIN DESCRIPTION - ONSET
ONSET: ON-GOING

## 2025-07-01 NOTE — ED NOTES
ED to Inpatient Handoff SBAR    Patient Name: Krystle Pope   :  1980  44 y.o.   MRN:  [id number]  Preferred Name  krystle  ED Room #:    Family/Caregiver Present no     Chief Complaint Wound Infection       Restraints no   Sitter no   Sepsis Risk Score    Isolation No active isolations   Fall Risk Assessment Presents to emergency department  because of falls (Syncope, seizure, or loss of consciousness): No, Age > 70: No, Altered Mental Status, Intoxication with alcohol or substance confusion (Disorientation, impaired judgment, poor safety awaremess, or inability to follow instructions): No, Impaired Mobility: Ambulates or transfers with assistive devices or assistance; Unable to ambulate or transer.: No, Nursing Judgement: No     Situation  Code Status: Prior No additional code details.    Allergies: Adhesive tape and Cinnamon  Weight: Patient Vitals for the past 96 hrs (Last 3 readings):   Weight   25 1427 128.4 kg (283 lb)     Arrived from: home  Hospital Problem/Diagnosis:  Principal Problem:    Cellulitis  Resolved Problems:    * No resolved hospital problems. *    Imaging:     Abnormal labs:   Abnormal Labs Reviewed   CBC WITH AUTO DIFFERENTIAL - Abnormal; Notable for the following components:       Result Value    WBC 14.1 (*)     Immature Granulocytes % 1 (*)     Neutrophils Absolute 9.11 (*)     All other components within normal limits   COMPREHENSIVE METABOLIC PANEL W/ REFLEX TO MG FOR LOW K - Abnormal; Notable for the following components:    Glucose 149 (*)     All other components within normal limits       Background  History:   Past Medical History:   Diagnosis Date    Arthritis 2018    Endometriosis     Hypertension     Lumbar disc disorder     Nausea & vomiting     Polycystic ovarian syndrome     Rash     Sleep apnea 2014       Assessment    Vitals/MEWS:    Level of Consciousness: Alert (0)   Vitals:    25 1427 25 1432 25 1532   BP:  (!) 160/81 133/71  daily      sodium chloride 0.9 % SOLN 250 mL with inFLIXimab-dyyb 100 MG SOLR Infuse intravenously      MAGNESIUM PO Take by mouth      ondansetron (ZOFRAN ODT) 4 MG disintegrating tablet Take 1 tablet by mouth every 8 hours as needed for Nausea 20 tablet 0    zinc 50 MG TABS tablet Take 1 tablet by mouth daily      Copper Gluconate 2 MG TABS Take by mouth daily      clobetasol (TEMOVATE) 0.05 % ointment Apply topically 2 times daily Apply topically 2 times daily.      tacrolimus (PROTOPIC) 0.1 % ointment Apply topically 2 times daily Apply topically 2 times daily.      cetirizine (ZYRTEC) 10 MG tablet Take 1 tablet by mouth daily      spironolactone (ALDACTONE) 100 MG tablet Take 1 tablet by mouth 2 times daily      benzoyl peroxide 5 % external liquid       clindamycin (CLEOCIN T) 1 % external solution       metroNIDAZOLE (METROCREAM) 0.75 % cream APPLY TO RASH ON FACE TWO TIMES A DAY      mupirocin (BACTROBAN) 2 % ointment APPLY TO OPEN SORES TWO TIMES A DAY      nystatin-triamcinolone (MYCOLOG II) 843320-4.1 UNIT/GM-% cream APPLY TOPICALLY THREE TIMES A DAY      polyethylene glycol (GLYCOLAX) 17 GM/SCOOP powder Take 17 g by mouth daily      inFLIXimab (INFLECTRA) 100 MG SOLR Infuse 130 mLs intravenously every 28 days            You may also review the ED PT Care Timeline found under the Summary Nursing Index tab.    Recommendation    Additional Comments:      Room assignment:   Inpatient nurse:   Inpatient nurse phone number:     Emergency department Nurse: Amara  Nurse call back #: [phone]       Electronically signed by: Electronically signed by Amara Arce RN on 7/1/2025 at 5:36 PMNurse Handoff Report

## 2025-07-01 NOTE — PROGRESS NOTES
4 Eyes Skin Assessment     NAME:  Krystle Pope  YOB: 1980  MEDICAL RECORD NUMBER:  [id number]    The patient is being assessed for  Admission    I agree that at least one RN has performed a thorough Head to Toe Skin Assessment on the patient. ALL assessment sites listed below have been assessed.      Areas assessed by both nurses:    Head, Face, Ears, Shoulders, Back, Chest, Arms, Elbows, Hands, Sacrum. Buttock, Coccyx, Ischium, Legs. Feet and Heels, and Under Medical Devices         Does the Patient have a Wound? Yes wound(s) were present on assessment. LDA wound assessment was Initiated and completed by RN      Draining HS wound to lower left abdominal fold.  Abdominal folds red, moist  Luiz Prevention initiated by RN: No  Wound Care Orders initiated by RN: No    Pressure Injury (Stage 1,2,3,4, Unstageable, DTI, NWPT, and Complex wounds) if present, place Wound referral order by RN under : No    New Ostomies, if present place, Ostomy referral order under : No     Nurse 1 eSignature: Electronically signed by Yajaira Jeffers RN on 7/1/25 at 6:44 PM EDT    **SHARE this note so that the co-signing nurse can place an eSignature**    Nurse 2 eSignature: Electronically signed by Mitra Headley RN on 7/1/25 at 6:57 PM EDT

## 2025-07-01 NOTE — ED PROVIDER NOTES
[facility] EMERGENCY DEPARTMENT  EMERGENCY DEPARTMENT ENCOUNTER        Pt Name: Krystle Pope  MRN: [id number]  Birthdate 1980  Date of evaluation: 7/1/2025  Provider: Chikis Boogie PA-C  PCP: Nestor Barbosa MD  Note Started: 2:35 PM EDT 7/1/25      MYESHA. I have evaluated this patient.        CHIEF COMPLAINT       Chief Complaint   Patient presents with    Wound Infection       HISTORY OF PRESENT ILLNESS: 1 or more Elements     History From: patient   Limitations to history : None    Krystle Pope is a 44 y.o. female who presents for evaluation of infected wound to her pelvic region.  Patient has history of hidradenitis suppurativa and states that she was seen by the dermatologist last week and had Kenalog injection.  States that she has had this in the past without difficulty, however, states that shortly thereafter she developed increasing pain swelling and redness of the area with associated body aches, fevers, chills, nausea and vomiting.  They had called in doxycycline and she started to feel somewhat better, however, she was then told that the cultures came back positive for actinomyces and she needed to follow-up with infectious disease.  She was not able to get an appointment with them until July 24 so they recommended her come to the ED.  States that the wound in her inguinal region is actively draining.  She has no other complaints or concerns at this time.    Nursing Notes were all reviewed and agreed with or any disagreements were addressed in the HPI.    REVIEW OF SYSTEMS :      Review of Systems   Constitutional:  Positive for chills and fever. Negative for appetite change.   HENT:  Negative for congestion and rhinorrhea.    Respiratory:  Negative for cough, shortness of breath and wheezing.    Cardiovascular:  Negative for chest pain.   Gastrointestinal:  Positive for nausea and vomiting. Negative for abdominal pain and diarrhea.   Genitourinary:  Positive for pelvic  UNIT/GM-% CREAM    APPLY TOPICALLY THREE TIMES A DAY    ONDANSETRON (ZOFRAN ODT) 4 MG DISINTEGRATING TABLET    Take 1 tablet by mouth every 8 hours as needed for Nausea    PANTOPRAZOLE (PROTONIX) 40 MG TABLET    Take 1 tablet by mouth daily    POLYETHYLENE GLYCOL (GLYCOLAX) 17 GM/SCOOP POWDER    Take 17 g by mouth daily    SODIUM CHLORIDE 0.9 % SOLN 250 ML WITH INFLIXIMAB-DYYB 100 MG SOLR    Infuse intravenously    SPIRONOLACTONE (ALDACTONE) 100 MG TABLET    Take 1 tablet by mouth 2 times daily    TACROLIMUS (PROTOPIC) 0.1 % OINTMENT    Apply topically 2 times daily Apply topically 2 times daily.    ZINC 50 MG TABS TABLET    Take 1 tablet by mouth daily       ALLERGIES     Adhesive tape and Cinnamon    FAMILYHISTORY       Family History   Problem Relation Age of Onset    Kidney Disease Mother         CKD    Hypertension Mother     Diabetes Mother     Cancer Mother         Skin cancer    High Blood Pressure Father     COPD Father     Emphysema Father     Elevated Lipids Sister         hypertriglyceridemia    Thyroid Cancer Sister     Cancer Sister     Asthma Brother     Sleep Apnea Brother     Depression Brother     Anxiety Disorder Brother     Other Maternal Grandfather         murdered    Stroke Paternal Grandmother     Heart Disease Paternal Grandfather         SOCIAL HISTORY       Social History     Tobacco Use    Smoking status: Never     Passive exposure: Never    Smokeless tobacco: Never   Vaping Use    Vaping status: Never Used   Substance Use Topics    Alcohol use: No    Drug use: Yes     Types: Marijuana (Weed)     Comment: Medical from dispensary       SCREENINGS     NIHSS:     GCS: Sky[place] Coma Scale  Eye Opening: Spontaneous  Best Verbal Response: Oriented  Best Motor Response: Obeys commands  [place] Coma Scale Score: 15    Heart Score      PECARN Last:       CIWA: CIWA Assessment  BP: 133/71  Pulse: 85  COW Score: No data recorded   CURB 65 Last:     PORT Score:     WELL Criteria:     PERC Score:    components:    Glucose 149 (*)     All other components within normal limits   CULTURE, BLOOD 1   CULTURE, BLOOD 2   LACTATE, SEPSIS   HCG, SERUM, QUALITATIVE   LACTATE, SEPSIS       When ordered only abnormal lab results are displayed. All other labs were within normal range or not returned as of this dictation.    EKG: When ordered, EKG's are interpreted by the Emergency Department Physician in the absence of a cardiologist.  Please see their note for interpretation of EKG.    RADIOLOGY:   Non-plain film images such as CT, Ultrasound and MRI are read by the radiologist.       No results found.      ED Provider US Interpretation.    No results found.    PROCEDURES   Unless otherwise noted below, none     Procedures      CRITICAL CARE TIME (.cctime)   There was a high probability of life-threatening clinical deterioration in the patient's condition requiring my urgent intervention.  I personally saw the patient and independently provided 31 minutes of non-concurrent critical care out of the total shared critical care time provided, excluding separately reportable procedures.       PAST MEDICAL HISTORY      has a past medical history of Arthritis (2018), Endometriosis, Hypertension, Lumbar disc disorder, Nausea & vomiting, Polycystic ovarian syndrome, Rash, and Sleep apnea (01/22/2014).     EMERGENCY DEPARTMENT COURSE and DIFFERENTIAL DIAGNOSIS/MDM:   Vitals:    Vitals:    07/01/25 1427 07/01/25 1432 07/01/25 1532   BP:  (!) 160/81 133/71   Pulse: 98  85   Resp: 19  18   Temp: 98.5 °F (36.9 °C)     TempSrc: Oral     SpO2: 99%  96%   Weight: 128.4 kg (283 lb)     Height: 1.626 m (5' 4\")         Is this patient to be included in the SEP-1 Core Measure due to severe sepsis or septic shock?   No   Exclusion criteria - the patient is NOT to be included for SEP-1 Core Measure due to:  May have criteria for sepsis, but does not meet criteria for severe sepsis or septic shock    Patient was given the following

## 2025-07-01 NOTE — ED TRIAGE NOTES
Patient arrives to ED c/o hidradenitis suppurativa, patient was seen by derm and the swabbed and tested positive for actinomyces and they prescribed doxycycline. Pt had injection of steroids on 6/25 and has experienced redness, swelling, drainage, fever and chills since.

## 2025-07-01 NOTE — PROGRESS NOTES
Pt requesting pain medication stronger than tylenol, c/o 7/10 pain to wound.  Perfect serve sent to Dr. Chand

## 2025-07-02 LAB
BASOPHILS # BLD: 0.1 K/UL (ref 0–0.2)
BASOPHILS NFR BLD: 1 %
EOSINOPHIL # BLD: 0.48 K/UL (ref 0–0.6)
EOSINOPHILS RELATIVE PERCENT: 3 %
ERYTHROCYTE [DISTWIDTH] IN BLOOD BY AUTOMATED COUNT: 13.5 % (ref 12.4–15.4)
HCT VFR BLD AUTO: 44.6 % (ref 36–48)
HGB BLD-MCNC: 14.1 G/DL (ref 12–16)
IMM GRANULOCYTES # BLD AUTO: 0.11 K/UL (ref 0–0.5)
IMM GRANULOCYTES NFR BLD: 1 %
LYMPHOCYTES NFR BLD: 3.68 K/UL (ref 1–5.1)
LYMPHOCYTES RELATIVE PERCENT: 25 %
MCH RBC QN AUTO: 30.7 PG (ref 26–34)
MCHC RBC AUTO-ENTMCNC: 31.6 G/DL (ref 31–36)
MCV RBC AUTO: 97.2 FL (ref 80–100)
MONOCYTES NFR BLD: 1.15 K/UL (ref 0–1.3)
MONOCYTES NFR BLD: 8 %
NEUTROPHILS NFR BLD: 63 %
NEUTS SEG NFR BLD: 9.22 K/UL (ref 1.7–7.7)
PLATELET # BLD AUTO: 321 K/UL (ref 135–450)
PMV BLD AUTO: 10 FL (ref 9.4–12.4)
RBC # BLD AUTO: 4.59 M/UL (ref 4–5.2)
WBC OTHER # BLD: 14.7 K/UL (ref 4–11)

## 2025-07-02 PROCEDURE — 2580000003 HC RX 258: Performed by: HOSPITALIST

## 2025-07-02 PROCEDURE — 1200000000 HC SEMI PRIVATE

## 2025-07-02 PROCEDURE — 6370000000 HC RX 637 (ALT 250 FOR IP): Performed by: HOSPITALIST

## 2025-07-02 PROCEDURE — 6360000002 HC RX W HCPCS: Performed by: HOSPITALIST

## 2025-07-02 PROCEDURE — 85025 COMPLETE CBC W/AUTO DIFF WBC: CPT

## 2025-07-02 PROCEDURE — 6360000002 HC RX W HCPCS: Performed by: STUDENT IN AN ORGANIZED HEALTH CARE EDUCATION/TRAINING PROGRAM

## 2025-07-02 PROCEDURE — 2500000003 HC RX 250 WO HCPCS: Performed by: HOSPITALIST

## 2025-07-02 PROCEDURE — 36415 COLL VENOUS BLD VENIPUNCTURE: CPT

## 2025-07-02 RX ORDER — PROCHLORPERAZINE EDISYLATE 5 MG/ML
10 INJECTION INTRAMUSCULAR; INTRAVENOUS EVERY 6 HOURS PRN
Status: DISCONTINUED | OUTPATIENT
Start: 2025-07-02 | End: 2025-07-05 | Stop reason: HOSPADM

## 2025-07-02 RX ORDER — NALTREXONE 380 MG
380 KIT INTRAMUSCULAR
COMMUNITY

## 2025-07-02 RX ORDER — KETOROLAC TROMETHAMINE 30 MG/ML
30 INJECTION, SOLUTION INTRAMUSCULAR; INTRAVENOUS EVERY 6 HOURS PRN
Status: DISCONTINUED | OUTPATIENT
Start: 2025-07-02 | End: 2025-07-05 | Stop reason: HOSPADM

## 2025-07-02 RX ORDER — MORPHINE SULFATE 2 MG/ML
1 INJECTION, SOLUTION INTRAMUSCULAR; INTRAVENOUS EVERY 4 HOURS PRN
Status: DISCONTINUED | OUTPATIENT
Start: 2025-07-02 | End: 2025-07-05 | Stop reason: HOSPADM

## 2025-07-02 RX ORDER — LISINOPRIL 10 MG/1
10 TABLET ORAL DAILY
Status: DISCONTINUED | OUTPATIENT
Start: 2025-07-02 | End: 2025-07-05 | Stop reason: HOSPADM

## 2025-07-02 RX ORDER — OXYCODONE HYDROCHLORIDE 5 MG/1
5 TABLET ORAL EVERY 4 HOURS PRN
Refills: 0 | Status: DISCONTINUED | OUTPATIENT
Start: 2025-07-02 | End: 2025-07-05 | Stop reason: HOSPADM

## 2025-07-02 RX ORDER — OXYCODONE HYDROCHLORIDE 5 MG/1
10 TABLET ORAL EVERY 4 HOURS PRN
Refills: 0 | Status: DISCONTINUED | OUTPATIENT
Start: 2025-07-02 | End: 2025-07-05 | Stop reason: HOSPADM

## 2025-07-02 RX ADMIN — AMLODIPINE BESYLATE 10 MG: 5 TABLET ORAL at 18:33

## 2025-07-02 RX ADMIN — SODIUM CHLORIDE, PRESERVATIVE FREE 10 ML: 5 INJECTION INTRAVENOUS at 21:29

## 2025-07-02 RX ADMIN — ZINC SULFATE 220 MG (50 MG) CAPSULE 50 MG: CAPSULE at 08:57

## 2025-07-02 RX ADMIN — POLYETHYLENE GLYCOL 3350 17 G: 17 POWDER, FOR SOLUTION ORAL at 08:57

## 2025-07-02 RX ADMIN — ESCITALOPRAM OXALATE 20 MG: 10 TABLET ORAL at 08:57

## 2025-07-02 RX ADMIN — PIPERACILLIN AND TAZOBACTAM 4500 MG: 4; .5 INJECTION, POWDER, FOR SOLUTION INTRAVENOUS at 00:06

## 2025-07-02 RX ADMIN — OXYCODONE HYDROCHLORIDE AND ACETAMINOPHEN 1 TABLET: 5; 325 TABLET ORAL at 00:03

## 2025-07-02 RX ADMIN — BUPROPION HYDROCHLORIDE 150 MG: 150 TABLET, FILM COATED ORAL at 21:28

## 2025-07-02 RX ADMIN — ONDANSETRON 4 MG: 2 INJECTION, SOLUTION INTRAMUSCULAR; INTRAVENOUS at 08:55

## 2025-07-02 RX ADMIN — BUPROPION HYDROCHLORIDE 150 MG: 150 TABLET, FILM COATED ORAL at 08:57

## 2025-07-02 RX ADMIN — KETOROLAC TROMETHAMINE 30 MG: 30 INJECTION, SOLUTION INTRAMUSCULAR at 09:06

## 2025-07-02 RX ADMIN — PIPERACILLIN AND TAZOBACTAM 4500 MG: 4; .5 INJECTION, POWDER, FOR SOLUTION INTRAVENOUS at 06:24

## 2025-07-02 RX ADMIN — LISINOPRIL 10 MG: 10 TABLET ORAL at 18:33

## 2025-07-02 RX ADMIN — SPIRONOLACTONE 100 MG: 25 TABLET ORAL at 21:28

## 2025-07-02 RX ADMIN — BUSPIRONE HYDROCHLORIDE 15 MG: 5 TABLET ORAL at 21:28

## 2025-07-02 RX ADMIN — ENOXAPARIN SODIUM 30 MG: 100 INJECTION SUBCUTANEOUS at 21:28

## 2025-07-02 RX ADMIN — BUSPIRONE HYDROCHLORIDE 15 MG: 5 TABLET ORAL at 08:57

## 2025-07-02 RX ADMIN — PANTOPRAZOLE SODIUM 40 MG: 40 TABLET, DELAYED RELEASE ORAL at 06:20

## 2025-07-02 RX ADMIN — PIPERACILLIN AND TAZOBACTAM 4500 MG: 4; .5 INJECTION, POWDER, FOR SOLUTION INTRAVENOUS at 15:35

## 2025-07-02 RX ADMIN — ONDANSETRON 4 MG: 2 INJECTION, SOLUTION INTRAMUSCULAR; INTRAVENOUS at 18:33

## 2025-07-02 RX ADMIN — CETIRIZINE HYDROCHLORIDE 10 MG: 10 TABLET, FILM COATED ORAL at 08:57

## 2025-07-02 RX ADMIN — ENOXAPARIN SODIUM 30 MG: 100 INJECTION SUBCUTANEOUS at 08:57

## 2025-07-02 RX ADMIN — MORPHINE SULFATE 1 MG: 2 INJECTION, SOLUTION INTRAMUSCULAR; INTRAVENOUS at 06:47

## 2025-07-02 RX ADMIN — PROCHLORPERAZINE EDISYLATE 10 MG: 5 INJECTION INTRAMUSCULAR; INTRAVENOUS at 12:28

## 2025-07-02 RX ADMIN — SPIRONOLACTONE 100 MG: 25 TABLET ORAL at 08:57

## 2025-07-02 ASSESSMENT — PAIN SCALES - GENERAL
PAINLEVEL_OUTOF10: 2
PAINLEVEL_OUTOF10: 10
PAINLEVEL_OUTOF10: 4
PAINLEVEL_OUTOF10: 2
PAINLEVEL_OUTOF10: 2
PAINLEVEL_OUTOF10: 4
PAINLEVEL_OUTOF10: 0

## 2025-07-02 ASSESSMENT — PAIN DESCRIPTION - DESCRIPTORS
DESCRIPTORS: DISCOMFORT
DESCRIPTORS: BURNING;SHARP
DESCRIPTORS: DISCOMFORT;BURNING

## 2025-07-02 ASSESSMENT — PAIN DESCRIPTION - PAIN TYPE
TYPE: ACUTE PAIN

## 2025-07-02 ASSESSMENT — PAIN DESCRIPTION - ONSET
ONSET: ON-GOING

## 2025-07-02 ASSESSMENT — PAIN - FUNCTIONAL ASSESSMENT
PAIN_FUNCTIONAL_ASSESSMENT: ACTIVITIES ARE NOT PREVENTED

## 2025-07-02 ASSESSMENT — PAIN DESCRIPTION - FREQUENCY
FREQUENCY: CONTINUOUS

## 2025-07-02 ASSESSMENT — PAIN DESCRIPTION - LOCATION
LOCATION: GROIN
LOCATION: ABDOMEN;GROIN
LOCATION: GROIN
LOCATION: GROIN;ABDOMEN

## 2025-07-02 ASSESSMENT — PAIN DESCRIPTION - ORIENTATION
ORIENTATION: LOWER;LEFT
ORIENTATION: RIGHT;LEFT
ORIENTATION: RIGHT;LEFT
ORIENTATION: LEFT

## 2025-07-02 NOTE — PLAN OF CARE
Problem: Chronic Conditions and Co-morbidities  Goal: Patient's chronic conditions and co-morbidity symptoms are monitored and maintained or improved  Outcome: Progressing  Flowsheets  Taken 7/1/2025 1945 by Jeimy Damian RN  Care Plan - Patient's Chronic Conditions and Co-Morbidity Symptoms are Monitored and Maintained or Improved: Monitor and assess patient's chronic conditions and comorbid symptoms for stability, deterioration, or improvement  Taken 7/1/2025 1819 by Yajaira Jeffers RN  Care Plan - Patient's Chronic Conditions and Co-Morbidity Symptoms are Monitored and Maintained or Improved:   Monitor and assess patient's chronic conditions and comorbid symptoms for stability, deterioration, or improvement   Collaborate with multidisciplinary team to address chronic and comorbid conditions and prevent exacerbation or deterioration     Problem: Discharge Planning  Goal: Discharge to home or other facility with appropriate resources  Outcome: Progressing  Flowsheets (Taken 7/1/2025 1945)  Discharge to home or other facility with appropriate resources: Identify barriers to discharge with patient and caregiver     Problem: Pain  Goal: Verbalizes/displays adequate comfort level or baseline comfort level  Outcome: Progressing

## 2025-07-02 NOTE — H&P
Hospitalist  History and Physical    Patient:  Krystle Pope  MRN: [id number]  PCP: Nestor Barbosa MD    CHIEF COMPLAINT: Lower abdominal pain      HISTORY OF PRESENT ILLNESS:   The patient Krystle Pope is a 44 y.o.female with medical history significant for endometriosis and hypertension lumbar disc disease polycystic ovarian syndrome history of hidradenitis and axillary and inguinal areas for which patient follows with dermatology.    Patient was recently seen by the dermatology and she was given a steroid injection as a treatment of her hidradenitis in the inguinal region and patient was started on treatment with doxycycline.  Patient reports that she developed nausea vomiting and suprapubic area pain redness and tenderness.  Patient's cultures came back positive for actinomyces.  As patient's symptoms got worse she was advised to come to the emergency room.      Past Medical History:        Diagnosis Date    Arthritis 2018    Endometriosis     Hypertension     Lumbar disc disorder     Nausea & vomiting     Polycystic ovarian syndrome     Rash     Sleep apnea 01/22/2014       Past Surgical History:        Procedure Laterality Date    CARDIAC SURGERY      cardia cath-no blockage    CHOLECYSTECTOMY  2008    EYE SURGERY  2010    detached retina    OTHER SURGICAL HISTORY  01/21/14    L4-5 BILATERAL LAMINECTOMY AND DISCECTOMY    OVARY SURGERY      multiple times    TONSILLECTOMY  1985    TYMPANOSTOMY TUBE PLACEMENT         Medications Prior to Admission:    Prior to Admission medications    Medication Sig Start Date End Date Taking? Authorizing Provider   buPROPion (WELLBUTRIN SR) 150 MG extended release tablet Take 1 tablet by mouth 2 times daily   Yes ProviderTho MD   doxycycline (VIBRAMYCIN) 50 MG capsule Take 2 capsules by mouth 2 times daily 6/25/25 7/19/25 Yes ProviderTho MD   naltrexone (DEPADE) 50 MG tablet Take 0.5 tablets by mouth in the morning and at bedtime   Yes Provider      BMP:    Recent Labs     07/01/25  1433      K 4.1      CO2 22   BUN 10   CREATININE 1.0   GLUCOSE 149*     Hepatic:   Recent Labs     07/01/25  1433   AST 30   ALT 30   BILITOT 0.3   ALKPHOS 98     Troponin: No results for input(s): \"TROPONINI\" in the last 72 hours.  BNP: No results for input(s): \"BNP\" in the last 72 hours.  INR: No results for input(s): \"INR\" in the last 72 hours.  Lab Results   Component Value Date/Time    LABA1C 5.9 03/13/2024 02:40 PM           No results for input(s): \"CKTOTAL\" in the last 72 hours.  -----------------------------------------------------------------    CT scan of the abdomen and pelvis  No abscess or osteolytic or osteoblastic lesions identified.      Assessment / Plan     Cellulitis in the suprapubic area  Patient is started on Zosyn we will continue it  Infectious disease consult    Pain management  R52  Continue with the IV and oral pain medication    Hypertension  Continue on home medication    Anxiety depression F 41.9  Continue on home medication      DVT and GI prophylaxis      Full Code      DANIELA PENNINGTON MD M.D    This note was transcribed using Dragon Dictation software. Please disregard any translational errors.

## 2025-07-02 NOTE — PROGRESS NOTES
\Physical Therapy and Occupational Therapy NOTE   Skilled Physical Therapy and Occupational Therapy  currently not indicated for patient due to:     No evidence of recent functional decline, Fall,  TIA, dizziness. .  Discussed with Dr. Edi Chand,  Discontinue Therapy.   Kianna Ambriz, PT  .

## 2025-07-02 NOTE — WOUND CARE
Wound care consult received. Patient has a history of hidradenitis admitted with cellulitis in the suprapubic area. Staff report she had an abscess that ruptured today when she got up to the BR. She has had purulent drainage from the area since. Pt reports dealing with this condition for over 30 years and has a wound care routine at home she would like to continue as her dermatologist recommended. Will plan to continue here.     Suprapubic region:  Wash with VASHE wound wash, pat dry.   Apply abd pad to area. Pt requests NO TAPE due to sensitivity.   Change bid and prn if soiled.        Refills on VASHE may be obtained from supply.     Please contact wound care via the clinical  for questions/concerns. Thank you .

## 2025-07-02 NOTE — PROGRESS NOTES
Hospitalist Progress Note  7/2/2025 8:15 AM    PCP: Nestor Barbosa MD    [id number]     Date of Admission: 7/1/2025                                                                                                                     HOSPITAL COURSE    Patient demographics:  The patient  Krystle Pope is a 44 y.o. female     Significant past medical history:   Patient Active Problem List   Diagnosis    Lumbar disc disease with radiculopathy    HTN (hypertension)    PCOS (polycystic ovarian syndrome)    Endometriosis    Anxiety and depression    Obstructive sleep apnea syndrome    Morbid obesity, unspecified obesity type (HCC)    Lower abdominal pain    Hematuria    Abnormal uterine bleeding (AUB)    Bilateral chronic serous otitis media    Bilateral rotator cuff dysfunction    Cervical spondylosis without myelopathy    Hidradenitis suppurativa    History of psoriatic arthritis    Horseshoe tear of retina of right eye without detachment    Ingrown right greater toenail    Irritable bowel syndrome with diarrhea    Lumbosacral spondylosis without myelopathy    Oligomenorrhea    Other chronic pain    Pain in toe of right foot    Pelvic pain in female    Primary osteoarthritis of both knees    Psoriatic arthritis (HCC)    Rosacea    Type 2 diabetes mellitus with complication, without long-term current use of insulin (HCC)    Hirsutism    Morbid obesity due to excess calories (HCC)    Controlled type 2 diabetes mellitus without complication, without long-term current use of insulin (HCC)    Abnormal thyroid blood test    Vomiting    Cellulitis         Presenting symptoms:      Diagnostic workup:      CONSULTS DURING ADMISSION :   None      Patient was diagnosed with:        Treatment while inpatient:  The patient Krystle Pope is a 44 y.o.female with medical history significant for endometriosis and hypertension lumbar disc disease polycystic ovarian syndrome history of hidradenitis and axillary and inguinal  \"AMYLASE\" in the last 72 hours.  No results for input(s): \"LIPASE\" in the last 72 hours.    UA:No results for input(s): \"NITRITE\", \"LABCAST\", \"WBCUA\", \"RBCUA\", \"MUCUS\" in the last 72 hours.    TROPONIN: No results for input(s): \"CKTOTAL\", \"TROPONINI\" in the last 72 hours.    Lab Results   Component Value Date/Time    URRFLXCULT Not Indicated 12/22/2021 08:26 PM       Invalid input(s): \"TSHREFLEX\"    No components found for: \"RDM0714\"  POC GLUCOSE:  No results for input(s): \"POCGLU\" in the last 72 hours.  No results for input(s): \"LABA1C\" in the last 72 hours.   Lab Results   Component Value Date/Time    LABA1C 5.9 03/13/2024 02:40 PM         ASSESSMENT AND PLAN  Cellulitis in the suprapubic area   continue patient on Zosyn  Infectious diseases to see the patient today     Pain management  R52  Continue with the IV and oral pain medication  Add on Toradol to control of pain     Hypertension  Continue on home medication     Anxiety depression F 41.9  Continue on home medication          DVT and GI prophylaxis                   Code Status: Full Code        Dispo - cc        The patient and / or the family were informed of the results of any tests, a time was given to answer questions, a plan was proposed and they agreed with plan.    DANIELA PENNINGTON MD    This note was transcribed using Dragon Dictation software. Please disregard any translational errors.

## 2025-07-02 NOTE — CARE COORDINATION
Discharge Planning Note:    Chart reviewed and it appears that patient has minimal needs for discharge at this time. Risk Score 8 %     Primary Care Physician is SHEILA ELLIOTT     Primary insurance is   [employer]       Please notify case management if any discharge needs are identified.      Case management will continue to follow progress and update discharge plan as needed.     Pt from home. Pt IPTA. No d/c needs at this time. Watch for home IVAB.

## 2025-07-03 PROBLEM — E66.01 MORBID OBESITY WITH BMI OF 45.0-49.9, ADULT (HCC): Status: ACTIVE | Noted: 2025-07-03

## 2025-07-03 PROBLEM — D72.828 NEUTROPHILIA: Status: ACTIVE | Noted: 2025-07-03

## 2025-07-03 PROBLEM — Z79.620 ADALIMUMAB (HUMIRA) LONG-TERM USE: Status: ACTIVE | Noted: 2025-07-03

## 2025-07-03 PROBLEM — L03.319 CELLULITIS OF PUBIC REGION: Status: ACTIVE | Noted: 2025-07-03

## 2025-07-03 PROBLEM — A42.9 ACTINOMYCES INFECTION: Status: ACTIVE | Noted: 2025-07-03

## 2025-07-03 PROBLEM — M79.3 PANNICULITIS: Status: ACTIVE | Noted: 2025-07-03

## 2025-07-03 LAB
ANION GAP SERPL CALCULATED.3IONS-SCNC: 14 MMOL/L (ref 3–16)
BUN SERPL-MCNC: 8 MG/DL (ref 7–20)
C DIFF GDH + TOXINS A+B STL QL IA.RAPID: NEGATIVE
CALCIUM SERPL-MCNC: 9 MG/DL (ref 8.3–10.6)
CHLORIDE SERPL-SCNC: 103 MMOL/L (ref 99–110)
CO2 SERPL-SCNC: 21 MMOL/L (ref 21–32)
CREAT SERPL-MCNC: 0.9 MG/DL (ref 0.5–1)
ERYTHROCYTE [DISTWIDTH] IN BLOOD BY AUTOMATED COUNT: 13.5 % (ref 12.4–15.4)
GFR, ESTIMATED: 86 ML/MIN/1.73M2
GLUCOSE SERPL-MCNC: 110 MG/DL (ref 70–99)
HCT VFR BLD AUTO: 42.9 % (ref 36–48)
HGB BLD-MCNC: 13.9 G/DL (ref 12–16)
MCH RBC QN AUTO: 31.7 PG (ref 26–34)
MCHC RBC AUTO-ENTMCNC: 32.4 G/DL (ref 31–36)
MCV RBC AUTO: 97.9 FL (ref 80–100)
PLATELET # BLD AUTO: 336 K/UL (ref 135–450)
PMV BLD AUTO: 9.9 FL
POTASSIUM SERPL-SCNC: 4 MMOL/L (ref 3.5–5.1)
RBC # BLD AUTO: 4.38 M/UL (ref 4–5.2)
SODIUM SERPL-SCNC: 137 MMOL/L (ref 136–145)
SPECIMEN DESCRIPTION: NORMAL
WBC OTHER # BLD: 14.1 K/UL (ref 4–11)

## 2025-07-03 PROCEDURE — 87449 NOS EACH ORGANISM AG IA: CPT

## 2025-07-03 PROCEDURE — 1200000000 HC SEMI PRIVATE

## 2025-07-03 PROCEDURE — 2500000003 HC RX 250 WO HCPCS: Performed by: HOSPITALIST

## 2025-07-03 PROCEDURE — 36415 COLL VENOUS BLD VENIPUNCTURE: CPT

## 2025-07-03 PROCEDURE — 85027 COMPLETE CBC AUTOMATED: CPT

## 2025-07-03 PROCEDURE — 6370000000 HC RX 637 (ALT 250 FOR IP): Performed by: HOSPITALIST

## 2025-07-03 PROCEDURE — 2580000003 HC RX 258: Performed by: HOSPITALIST

## 2025-07-03 PROCEDURE — 80048 BASIC METABOLIC PNL TOTAL CA: CPT

## 2025-07-03 PROCEDURE — 99223 1ST HOSP IP/OBS HIGH 75: CPT | Performed by: INTERNAL MEDICINE

## 2025-07-03 PROCEDURE — 87324 CLOSTRIDIUM AG IA: CPT

## 2025-07-03 PROCEDURE — 6360000002 HC RX W HCPCS: Performed by: HOSPITALIST

## 2025-07-03 RX ADMIN — BUSPIRONE HYDROCHLORIDE 15 MG: 5 TABLET ORAL at 21:57

## 2025-07-03 RX ADMIN — SPIRONOLACTONE 100 MG: 25 TABLET ORAL at 10:04

## 2025-07-03 RX ADMIN — PIPERACILLIN AND TAZOBACTAM 4500 MG: 4; .5 INJECTION, POWDER, FOR SOLUTION INTRAVENOUS at 10:15

## 2025-07-03 RX ADMIN — AMLODIPINE BESYLATE 10 MG: 5 TABLET ORAL at 21:57

## 2025-07-03 RX ADMIN — ENOXAPARIN SODIUM 30 MG: 100 INJECTION SUBCUTANEOUS at 22:02

## 2025-07-03 RX ADMIN — BUSPIRONE HYDROCHLORIDE 15 MG: 5 TABLET ORAL at 10:03

## 2025-07-03 RX ADMIN — PANTOPRAZOLE SODIUM 40 MG: 40 TABLET, DELAYED RELEASE ORAL at 06:09

## 2025-07-03 RX ADMIN — ENOXAPARIN SODIUM 30 MG: 100 INJECTION SUBCUTANEOUS at 10:04

## 2025-07-03 RX ADMIN — ZINC SULFATE 220 MG (50 MG) CAPSULE 50 MG: CAPSULE at 10:03

## 2025-07-03 RX ADMIN — ESCITALOPRAM OXALATE 20 MG: 10 TABLET ORAL at 10:04

## 2025-07-03 RX ADMIN — BUPROPION HYDROCHLORIDE 150 MG: 150 TABLET, FILM COATED ORAL at 22:05

## 2025-07-03 RX ADMIN — BUPROPION HYDROCHLORIDE 150 MG: 150 TABLET, FILM COATED ORAL at 10:03

## 2025-07-03 RX ADMIN — SODIUM CHLORIDE, PRESERVATIVE FREE 10 ML: 5 INJECTION INTRAVENOUS at 22:02

## 2025-07-03 RX ADMIN — PIPERACILLIN AND TAZOBACTAM 4500 MG: 4; .5 INJECTION, POWDER, FOR SOLUTION INTRAVENOUS at 16:30

## 2025-07-03 RX ADMIN — PIPERACILLIN AND TAZOBACTAM 4500 MG: 4; .5 INJECTION, POWDER, FOR SOLUTION INTRAVENOUS at 00:58

## 2025-07-03 RX ADMIN — SPIRONOLACTONE 100 MG: 25 TABLET ORAL at 21:59

## 2025-07-03 RX ADMIN — SODIUM CHLORIDE: 0.9 INJECTION, SOLUTION INTRAVENOUS at 06:10

## 2025-07-03 RX ADMIN — SODIUM CHLORIDE, PRESERVATIVE FREE 10 ML: 5 INJECTION INTRAVENOUS at 10:04

## 2025-07-03 RX ADMIN — CETIRIZINE HYDROCHLORIDE 10 MG: 10 TABLET, FILM COATED ORAL at 10:03

## 2025-07-03 RX ADMIN — SODIUM CHLORIDE: 0.9 INJECTION, SOLUTION INTRAVENOUS at 23:24

## 2025-07-03 RX ADMIN — LISINOPRIL 10 MG: 10 TABLET ORAL at 10:03

## 2025-07-03 ASSESSMENT — PAIN SCALES - GENERAL
PAINLEVEL_OUTOF10: 0
PAINLEVEL_OUTOF10: 1
PAINLEVEL_OUTOF10: 3
PAINLEVEL_OUTOF10: 0
PAINLEVEL_OUTOF10: 0

## 2025-07-03 NOTE — PROGRESS NOTES
Pt sitting up in bed. Alert and oriented. Gets up independently. Tolerates a regular diet. NS at 100/hr infusing in left AC. Pt calm and pleasant and calls out appropriately. Abdominal dressing is dry and intact. Pt comfortable and not requesting any pain medicine. Updated on plan of care. Call light in reach will continue to monitor.

## 2025-07-03 NOTE — PLAN OF CARE
Problem: Pain  Goal: Verbalizes/displays adequate comfort level or baseline comfort level  7/3/2025 1239 by Tatianna Cleveland, RN  Outcome: Progressing  7/3/2025 0004 by Monserrat Nieto, RN  Outcome: Progressing

## 2025-07-03 NOTE — PROGRESS NOTES
Hospitalist Progress Note  7/3/2025 9:38 AM    PCP: Nestor Barbosa MD    [id number]     Date of Admission: 7/1/2025                                                                                                                     HOSPITAL COURSE    Patient demographics:  The patient  Krystle Pope is a 44 y.o. female     Significant past medical history:   Patient Active Problem List   Diagnosis    Lumbar disc disease with radiculopathy    HTN (hypertension)    PCOS (polycystic ovarian syndrome)    Endometriosis    Anxiety and depression    Obstructive sleep apnea syndrome    Morbid obesity, unspecified obesity type (HCC)    Lower abdominal pain    Hematuria    Abnormal uterine bleeding (AUB)    Bilateral chronic serous otitis media    Bilateral rotator cuff dysfunction    Cervical spondylosis without myelopathy    Hidradenitis suppurativa    History of psoriatic arthritis    Horseshoe tear of retina of right eye without detachment    Ingrown right greater toenail    Irritable bowel syndrome with diarrhea    Lumbosacral spondylosis without myelopathy    Oligomenorrhea    Other chronic pain    Pain in toe of right foot    Pelvic pain in female    Primary osteoarthritis of both knees    Psoriatic arthritis (HCC)    Rosacea    Type 2 diabetes mellitus with complication, without long-term current use of insulin (HCC)    Hirsutism    Morbid obesity due to excess calories (HCC)    Controlled type 2 diabetes mellitus without complication, without long-term current use of insulin (HCC)    Abnormal thyroid blood test    Vomiting    Cellulitis         Presenting symptoms:      Diagnostic workup:      CONSULTS DURING ADMISSION :   IP CONSULT TO INFECTIOUS DISEASES      Patient was diagnosed with:        Treatment while inpatient:  The patient Krystle Pope is a 44 y.o.female with medical history significant for endometriosis and hypertension lumbar disc disease polycystic ovarian syndrome history of hidradenitis  103   CO2 22 21   BUN 10 8   CREATININE 1.0 0.9       LIVER PROFILE:   Recent Labs     07/01/25  1433   ALKPHOS 98   AST 30   ALT 30   BILITOT 0.3     No results for input(s): \"AMYLASE\" in the last 72 hours.  No results for input(s): \"LIPASE\" in the last 72 hours.    UA:No results for input(s): \"NITRITE\", \"LABCAST\", \"WBCUA\", \"RBCUA\", \"MUCUS\" in the last 72 hours.    TROPONIN: No results for input(s): \"CKTOTAL\", \"TROPONINI\" in the last 72 hours.    Lab Results   Component Value Date/Time    URRFLXCULT Not Indicated 12/22/2021 08:26 PM       Invalid input(s): \"TSHREFLEX\"    No components found for: \"OJV2570\"  POC GLUCOSE:  No results for input(s): \"POCGLU\" in the last 72 hours.  No results for input(s): \"LABA1C\" in the last 72 hours.   Lab Results   Component Value Date/Time    LABA1C 5.9 03/13/2024 02:40 PM         ASSESSMENT AND PLAN  Cellulitis in the suprapubic area  Infectious disease consult is appreciated  Plan is to continue with [facility]  Infectious disease to give guideline for discharge antibiotics     Pain management  R52  Continue with the IV and oral pain medication  Add on Toradol to control of pain     Hypertension  Continue on home medication     Anxiety depression F 41.9  Continue on home medication          DVT and GI prophylaxis                   Code Status: Full Code        Dispo - cc        The patient and / or the family were informed of the results of any tests, a time was given to answer questions, a plan was proposed and they agreed with plan.    DANIELA PENNINGTON MD    This note was transcribed using Dragon Dictation software. Please disregard any translational errors.

## 2025-07-03 NOTE — CONSULTS
Infectious Diseases Inpatient Consult Note      Reason for Consult: Pubic area and abdominal  wall cellulitis, hidradenitis, WBC elevation, Actinomyces infection     Requesting Physician:       Primary Care Physician:  Nestor Barbosa MD    History Obtained From:  [facility] and patient    CHIEF COMPLAINT:     Chief Complaint   Patient presents with    Wound Infection         HISTORY OF PRESENT ILLNESS:  44 y.o. female with significant history for morbid obesity BMI 48, endometriosis, hypertension, polycystic ovarian syndrome, sleep apnea, hidradenitis involving the axillary and inguinal area patient is currently following up with dermatology. She is on INFLIXIMAB  for Hidradenitis and Psoriatic arthritis. she received Kenalog steroid injection for hidradenitis recently at  and followed by Dermatology at   SHE was also placed on doxycycline she developed nausea vomiting increasing pain and redness in the suprapubic area. Per HPI she had cultures  which was positive for Actinomyces  SEE care every where report and patient was advised to come to the hospital for IV antibiotics.  Labs indicated with WBC 14.1, hemoglobin 14.8 creatinine stable LFT normal blood culture in process CT pelvis indicating cellulitis in the inferior portion of the pannus with small fluid collection concern for soft tissue infection and abscess.        Past Medical History:    Past Medical History:   Diagnosis Date    Arthritis 2018    Endometriosis     Hypertension     Lumbar disc disorder     Nausea & vomiting     Polycystic ovarian syndrome     Rash     Sleep apnea 01/22/2014       Past Surgical History:    Past Surgical History:   Procedure Laterality Date    CARDIAC SURGERY      cardia cath-no blockage    CHOLECYSTECTOMY  2008    EYE SURGERY  2010    detached retina    OTHER SURGICAL HISTORY  01/21/14    L4-5 BILATERAL LAMINECTOMY AND DISCECTOMY    OVARY SURGERY      multiple times    TONSILLECTOMY  1985    TYMPANOSTOMY TUBE  PLACEMENT         Current Medications:    Outpatient Medications Marked as Taking for the 7/1/25 encounter (Hospital Encounter)   Medication Sig Dispense Refill    naltrexone (VIVITROL) 380 MG injection Inject 380 mg into the muscle every 28 days      buPROPion (WELLBUTRIN SR) 150 MG extended release tablet Take 1 tablet by mouth 2 times daily      doxycycline (VIBRAMYCIN) 50 MG capsule Take 2 capsules by mouth 2 times daily      meloxicam (MOBIC) 15 MG tablet TAKE 1 TABLET BY MOUTH EVERY DAY AS NEEDED FOR PAIN 30 tablet 0    ketoconazole (NIZORAL) 2 % cream APPLY TOPICALLY TO RASH IN SKIN FOLDS DAILY      amLODIPine (NORVASC) 10 MG tablet Take 1 tablet by mouth daily      escitalopram (LEXAPRO) 20 MG tablet TAKE 1 TABLET BY MOUTH EVERY DAY 30 tablet 3    busPIRone (BUSPAR) 15 MG tablet TAKE 1 TABLET BY MOUTH IN THE MORNING AND IN THE EVENING 60 tablet 3    pantoprazole (PROTONIX) 40 MG tablet Take 1 tablet by mouth daily      ondansetron (ZOFRAN ODT) 4 MG disintegrating tablet Take 1 tablet by mouth every 8 hours as needed for Nausea 20 tablet 0    zinc 50 MG TABS tablet Take 1 tablet by mouth daily      Copper Gluconate 2 MG TABS Take 2 mg by mouth daily      clobetasol (TEMOVATE) 0.05 % ointment Apply topically 2 times daily Apply topically 2 times daily.      tacrolimus (PROTOPIC) 0.1 % ointment Apply topically 2 times daily Apply topically 2 times daily.      cetirizine (ZYRTEC) 10 MG tablet Take 1 tablet by mouth daily      spironolactone (ALDACTONE) 100 MG tablet Take 1 tablet by mouth 2 times daily      benzoyl peroxide 5 % external liquid       metroNIDAZOLE (METROCREAM) 0.75 % cream APPLY TO RASH ON FACE TWO TIMES A DAY      mupirocin (BACTROBAN) 2 % ointment APPLY TO OPEN SORES TWO TIMES A DAY      nystatin-triamcinolone (MYCOLOG II) 721936-1.1 UNIT/GM-% cream APPLY TOPICALLY THREE TIMES A DAY      polyethylene glycol (GLYCOLAX) 17 GM/SCOOP powder Take 17 g by mouth daily         Allergies:  Adhesive tape  piperacillin/tazobactam  Change to IV Unasyn x 3 gm Q 6 hr  hours  Trend WBC  Trend CRP  Obtain culture if there is any drainage   records from dermatology office [facility] - Reviewed see care every where   Hold infliximab for x 2 more weeks  Wt loss recommended  Home on oral Augmentin x 875 mg q 12 hr x 10 days    D/c once the area is looking better      Discussed with patient/Family and Nursing  d/w     Medical Decision Making:  The following items were considered in medical decision making:  Discussion of patient care with other providers  Reviewed clinical lab tests  Reviewed radiology tests  Reviewed other diagnostic tests/interventions  Independent review of radiologic images  Independent review of  Microbiology cultures and other micro tests reviewed       Risk of Complications/Morbidity: High      Illness(es)/ Infection present that pose threat to bodily function.   There is potential for severe exacerbation of infection/side effects of treatment.  Therapy requires intensive monitoring for antimicrobial agent toxicity.  Review of Antibiotic resistance patterns and lab results  Management decisions including changes in Antimicrobial therapy and infection control strategies  Coordination with Micro lab, public health agencies or interdisciplinary teams      Thanks for allowing me to participate in your patient's care please call me with any questions or concerns.    Dr. Gustavo Dong MD  Infectious Disease  [facility] Physician  Phone: [phone]   Fax : [phone]

## 2025-07-03 NOTE — CARE COORDINATION
Discharge Planning Note:    Chart reviewed and pt has possible IV medication needs upon discharge.  SW/CM contacted Teddy  with [employer] requesting home infusion benefits check.  Teddy to report back with benefit allowances.

## 2025-07-04 LAB
ANION GAP SERPL CALCULATED.3IONS-SCNC: 14 MMOL/L (ref 3–16)
BUN SERPL-MCNC: 8 MG/DL (ref 7–20)
CALCIUM SERPL-MCNC: 8.8 MG/DL (ref 8.3–10.6)
CHLORIDE SERPL-SCNC: 102 MMOL/L (ref 99–110)
CO2 SERPL-SCNC: 22 MMOL/L (ref 21–32)
CREAT SERPL-MCNC: 0.9 MG/DL (ref 0.5–1)
ERYTHROCYTE [DISTWIDTH] IN BLOOD BY AUTOMATED COUNT: 13.7 % (ref 12.4–15.4)
GFR, ESTIMATED: 83 ML/MIN/1.73M2
GLUCOSE SERPL-MCNC: 101 MG/DL (ref 70–99)
HCT VFR BLD AUTO: 43.1 % (ref 36–48)
HGB BLD-MCNC: 13.7 G/DL (ref 12–16)
MCH RBC QN AUTO: 31.1 PG (ref 26–34)
MCHC RBC AUTO-ENTMCNC: 31.8 G/DL (ref 31–36)
MCV RBC AUTO: 97.7 FL (ref 80–100)
PLATELET # BLD AUTO: 330 K/UL (ref 135–450)
PMV BLD AUTO: 10.2 FL (ref 9.4–12.4)
POTASSIUM SERPL-SCNC: 3.9 MMOL/L (ref 3.5–5.1)
RBC # BLD AUTO: 4.41 M/UL (ref 4–5.2)
SODIUM SERPL-SCNC: 138 MMOL/L (ref 136–145)
WBC OTHER # BLD: 12.7 K/UL (ref 4–11)

## 2025-07-04 PROCEDURE — 2580000003 HC RX 258: Performed by: INTERNAL MEDICINE

## 2025-07-04 PROCEDURE — 2500000003 HC RX 250 WO HCPCS: Performed by: HOSPITALIST

## 2025-07-04 PROCEDURE — 1200000000 HC SEMI PRIVATE

## 2025-07-04 PROCEDURE — 6360000002 HC RX W HCPCS: Performed by: INTERNAL MEDICINE

## 2025-07-04 PROCEDURE — 99233 SBSQ HOSP IP/OBS HIGH 50: CPT | Performed by: INTERNAL MEDICINE

## 2025-07-04 PROCEDURE — 6370000000 HC RX 637 (ALT 250 FOR IP): Performed by: HOSPITALIST

## 2025-07-04 PROCEDURE — 2580000003 HC RX 258: Performed by: HOSPITALIST

## 2025-07-04 PROCEDURE — 36415 COLL VENOUS BLD VENIPUNCTURE: CPT

## 2025-07-04 PROCEDURE — 85027 COMPLETE CBC AUTOMATED: CPT

## 2025-07-04 PROCEDURE — 80048 BASIC METABOLIC PNL TOTAL CA: CPT

## 2025-07-04 PROCEDURE — 6360000002 HC RX W HCPCS: Performed by: HOSPITALIST

## 2025-07-04 RX ORDER — LOPERAMIDE HYDROCHLORIDE 2 MG/1
2 CAPSULE ORAL 4 TIMES DAILY PRN
Status: DISCONTINUED | OUTPATIENT
Start: 2025-07-04 | End: 2025-07-05 | Stop reason: HOSPADM

## 2025-07-04 RX ADMIN — LISINOPRIL 10 MG: 10 TABLET ORAL at 08:03

## 2025-07-04 RX ADMIN — CETIRIZINE HYDROCHLORIDE 10 MG: 10 TABLET, FILM COATED ORAL at 08:02

## 2025-07-04 RX ADMIN — LOPERAMIDE HYDROCHLORIDE 2 MG: 2 CAPSULE ORAL at 17:04

## 2025-07-04 RX ADMIN — AMLODIPINE BESYLATE 10 MG: 5 TABLET ORAL at 21:02

## 2025-07-04 RX ADMIN — SODIUM CHLORIDE 3000 MG: 9 INJECTION, SOLUTION INTRAVENOUS at 18:59

## 2025-07-04 RX ADMIN — ENOXAPARIN SODIUM 30 MG: 100 INJECTION SUBCUTANEOUS at 08:03

## 2025-07-04 RX ADMIN — LOPERAMIDE HYDROCHLORIDE 2 MG: 2 CAPSULE ORAL at 12:27

## 2025-07-04 RX ADMIN — ACETAMINOPHEN 650 MG: 325 TABLET ORAL at 18:56

## 2025-07-04 RX ADMIN — SODIUM CHLORIDE 3000 MG: 9 INJECTION, SOLUTION INTRAVENOUS at 12:28

## 2025-07-04 RX ADMIN — BUSPIRONE HYDROCHLORIDE 15 MG: 5 TABLET ORAL at 08:03

## 2025-07-04 RX ADMIN — BUPROPION HYDROCHLORIDE 150 MG: 150 TABLET, FILM COATED ORAL at 21:02

## 2025-07-04 RX ADMIN — BUSPIRONE HYDROCHLORIDE 15 MG: 5 TABLET ORAL at 21:02

## 2025-07-04 RX ADMIN — SODIUM CHLORIDE, PRESERVATIVE FREE 10 ML: 5 INJECTION INTRAVENOUS at 08:42

## 2025-07-04 RX ADMIN — SODIUM CHLORIDE 3000 MG: 9 INJECTION, SOLUTION INTRAVENOUS at 00:44

## 2025-07-04 RX ADMIN — ZINC SULFATE 220 MG (50 MG) CAPSULE 50 MG: CAPSULE at 08:06

## 2025-07-04 RX ADMIN — SODIUM CHLORIDE 3000 MG: 9 INJECTION, SOLUTION INTRAVENOUS at 06:26

## 2025-07-04 RX ADMIN — ONDANSETRON 4 MG: 2 INJECTION, SOLUTION INTRAMUSCULAR; INTRAVENOUS at 08:41

## 2025-07-04 RX ADMIN — ESCITALOPRAM OXALATE 20 MG: 10 TABLET ORAL at 08:02

## 2025-07-04 RX ADMIN — SODIUM CHLORIDE: 0.9 INJECTION, SOLUTION INTRAVENOUS at 14:27

## 2025-07-04 RX ADMIN — ENOXAPARIN SODIUM 30 MG: 100 INJECTION SUBCUTANEOUS at 21:02

## 2025-07-04 RX ADMIN — SPIRONOLACTONE 100 MG: 25 TABLET ORAL at 08:02

## 2025-07-04 RX ADMIN — SPIRONOLACTONE 100 MG: 25 TABLET ORAL at 21:03

## 2025-07-04 RX ADMIN — BUPROPION HYDROCHLORIDE 150 MG: 150 TABLET, FILM COATED ORAL at 08:41

## 2025-07-04 RX ADMIN — PANTOPRAZOLE SODIUM 40 MG: 40 TABLET, DELAYED RELEASE ORAL at 06:31

## 2025-07-04 ASSESSMENT — PAIN DESCRIPTION - LOCATION
LOCATION: PELVIS
LOCATION: HEAD
LOCATION: PELVIS

## 2025-07-04 ASSESSMENT — PAIN - FUNCTIONAL ASSESSMENT
PAIN_FUNCTIONAL_ASSESSMENT: ACTIVITIES ARE NOT PREVENTED

## 2025-07-04 ASSESSMENT — PAIN DESCRIPTION - PAIN TYPE
TYPE: ACUTE PAIN
TYPE: ACUTE PAIN

## 2025-07-04 ASSESSMENT — PAIN SCALES - GENERAL
PAINLEVEL_OUTOF10: 0
PAINLEVEL_OUTOF10: 3
PAINLEVEL_OUTOF10: 0
PAINLEVEL_OUTOF10: 1

## 2025-07-04 ASSESSMENT — PAIN DESCRIPTION - ORIENTATION
ORIENTATION: ANTERIOR
ORIENTATION: LOWER
ORIENTATION: LOWER

## 2025-07-04 ASSESSMENT — PAIN DESCRIPTION - DESCRIPTORS
DESCRIPTORS: ACHING
DESCRIPTORS: DULL
DESCRIPTORS: DULL

## 2025-07-04 ASSESSMENT — PAIN DESCRIPTION - FREQUENCY
FREQUENCY: INTERMITTENT
FREQUENCY: CONTINUOUS

## 2025-07-04 ASSESSMENT — PAIN DESCRIPTION - ONSET: ONSET: AWAKENED FROM SLEEP

## 2025-07-04 NOTE — PROGRESS NOTES
Infectious Diseases Inpatient Progress Note    CHIEF COMPLAINT:     Chills  Pubic area cellulitis  Hidradenitis  Actinomyces infection  Diabetes  Morbid obesity  On long-term infliximab treatment          HISTORY OF PRESENT ILLNESS:  44 y.o. female with significant history for morbid obesity BMI 48, endometriosis, hypertension, polycystic ovarian syndrome, sleep apnea, hidradenitis involving the axillary and inguinal area patient is currently following up with dermatology. She is on INFLIXIMAB  for Hidradenitis and Psoriatic arthritis. she received Kenalog steroid injection for hidradenitis recently at  and followed by Dermatology at   SHE was also placed on doxycycline she developed nausea vomiting increasing pain and redness in the suprapubic area. Per HPI she had cultures  which was positive for Actinomyces  SEE care every where report and patient was advised to come to the hospital for IV antibiotics.  Labs indicated with WBC 14.1, hemoglobin 14.8 creatinine stable LFT normal blood culture in process CT pelvis indicating cellulitis in the inferior portion of the pannus with small fluid collection concern for soft tissue infection and abscess.    Interval history: No chills local area pubic symphysis area redness slowly resolving not much drainage wound cultures reviewed from [facility] on appropriate antibiotic therapy complains of ongoing local pain    Past Medical History:    Past Medical History:   Diagnosis Date    Arthritis 2018    Endometriosis     Hypertension     Lumbar disc disorder     Nausea & vomiting     Polycystic ovarian syndrome     Rash     Sleep apnea 01/22/2014       Past Surgical History:    Past Surgical History:   Procedure Laterality Date    CARDIAC SURGERY      cardia cath-no blockage    CHOLECYSTECTOMY  2008    EYE SURGERY  2010    detached retina    OTHER SURGICAL HISTORY  01/21/14    L4-5 BILATERAL LAMINECTOMY AND DISCECTOMY    OVARY SURGERY      multiple times  bleeding (AUB) N93.9    Bilateral chronic serous otitis media H65.23    Bilateral rotator cuff dysfunction M67.911, M67.912    Cervical spondylosis without myelopathy M47.812    Hidradenitis suppurativa L73.2    History of psoriatic arthritis Z87.2    Horseshoe tear of retina of right eye without detachment H33.311    Ingrown right greater toenail L60.0    Irritable bowel syndrome with diarrhea K58.0    Lumbosacral spondylosis without myelopathy M47.817    Oligomenorrhea N91.5    Other chronic pain G89.29    Pain in toe of right foot M79.674    Pelvic pain in female R10.2    Primary osteoarthritis of both knees M17.0    Psoriatic arthritis ([facility]) L40.50    Rosacea L71.9    Type 2 diabetes mellitus with complication, without long-term current use of insulin ([facility]) E11.8    Hirsutism L68.0    Morbid obesity due to excess calories ([facility]) E66.01    Controlled type 2 diabetes mellitus without complication, without long-term current use of insulin ([facility]) E11.9    Abnormal thyroid blood test R79.89    Vomiting R11.10    Cellulitis L03.90    Panniculitis M79.3    Neutrophilia D72.828    Adalimumab (Humira) long-term use Z79.620    Actinomyces infection A42.9    Morbid obesity with BMI of 45.0-49.9, adult ([facility]) E66.01, Z68.42    Cellulitis of pubic region L03.319        ICD-10-CM    1. Cellulitis of pubic region  L03.319            Cellulitis of the pubic area  Abdominal wall cellulitis  Large pannus  Panniculitis  Hidradenitis  WBC elevation  Low-grade fever  Morbid obesity BMI 48  Endometriosis  Hypertension  Obstructive sleep apnea  S/p Steroid injections for Hidradenitis   She is on INFLIXIMAB infusion every x 4 weeks  = immune suppressed   H/O Psoriatic arthritis    H/o C diff in the past   Wound cx Actinomyces from 6/25/25 see [facility] every where     Given the ongoing soft tissue infection with cellulitis and WBC elevation agree with IV antibiotics and she is immune suppressed from INFLIXIMAB use and

## 2025-07-04 NOTE — PROGRESS NOTES
Hospitalist Progress Note  7/4/2025 9:45 AM    PCP: Nestor Barbosa MD    [id number]     Date of Admission: 7/1/2025                                                                                                                     HOSPITAL COURSE    Patient demographics:  The patient  Krystle Pope is a 44 y.o. female     Significant past medical history:   Patient Active Problem List   Diagnosis    Lumbar disc disease with radiculopathy    HTN (hypertension)    PCOS (polycystic ovarian syndrome)    Endometriosis    Anxiety and depression    Obstructive sleep apnea syndrome    Morbid obesity, unspecified obesity type (HCC)    Lower abdominal pain    Hematuria    Abnormal uterine bleeding (AUB)    Bilateral chronic serous otitis media    Bilateral rotator cuff dysfunction    Cervical spondylosis without myelopathy    Hidradenitis suppurativa    History of psoriatic arthritis    Horseshoe tear of retina of right eye without detachment    Ingrown right greater toenail    Irritable bowel syndrome with diarrhea    Lumbosacral spondylosis without myelopathy    Oligomenorrhea    Other chronic pain    Pain in toe of right foot    Pelvic pain in female    Primary osteoarthritis of both knees    Psoriatic arthritis (HCC)    Rosacea    Type 2 diabetes mellitus with complication, without long-term current use of insulin (HCC)    Hirsutism    Morbid obesity due to excess calories (HCC)    Controlled type 2 diabetes mellitus without complication, without long-term current use of insulin (HCC)    Abnormal thyroid blood test    Vomiting    Cellulitis    Panniculitis    Neutrophilia    Adalimumab (Humira) long-term use    Actinomyces infection    Morbid obesity with BMI of 45.0-49.9, adult (HCC)    Cellulitis of pubic region         Presenting symptoms:      Diagnostic workup:      CONSULTS DURING ADMISSION :   IP CONSULT TO INFECTIOUS DISEASES      Patient was diagnosed with:        Treatment while inpatient:  The patient

## 2025-07-04 NOTE — PLAN OF CARE
Problem: Chronic Conditions and Co-morbidities  Goal: Patient's chronic conditions and co-morbidity symptoms are monitored and maintained or improved  7/4/2025 0738 by Lo De La Paz RN  Outcome: Progressing  7/3/2025 2356 by Aria Delgado RN  Outcome: Progressing     Problem: Discharge Planning  Goal: Discharge to home or other facility with appropriate resources  7/4/2025 0738 by Lo De La Paz RN  Outcome: Progressing  7/3/2025 2356 by Aria Delgado RN  Outcome: Progressing     Problem: Pain  Goal: Verbalizes/displays adequate comfort level or baseline comfort level  7/4/2025 0738 by Lo De La Paz RN  Outcome: Progressing  7/3/2025 2356 by Aria Delgado RN  Outcome: Progressing

## 2025-07-04 NOTE — PLAN OF CARE
Problem: Chronic Conditions and Co-morbidities  Goal: Patient's chronic conditions and co-morbidity symptoms are monitored and maintained or improved  Outcome: Progressing     Problem: Discharge Planning  Goal: Discharge to home or other facility with appropriate resources  Outcome: Progressing     Problem: Pain  Goal: Verbalizes/displays adequate comfort level or baseline comfort level  7/3/2025 2356 by Aria Delgado, RN  Outcome: Progressing  7/3/2025 1239 by Tatianna Cleveland RN  Outcome: Progressing

## 2025-07-05 VITALS
DIASTOLIC BLOOD PRESSURE: 81 MMHG | HEART RATE: 88 BPM | WEIGHT: 283 LBS | RESPIRATION RATE: 18 BRPM | OXYGEN SATURATION: 97 % | HEIGHT: 64 IN | TEMPERATURE: 97.5 F | BODY MASS INDEX: 48.32 KG/M2 | SYSTOLIC BLOOD PRESSURE: 140 MMHG

## 2025-07-05 LAB
ANION GAP SERPL CALCULATED.3IONS-SCNC: 13 MMOL/L (ref 3–16)
BUN SERPL-MCNC: 6 MG/DL (ref 7–20)
CALCIUM SERPL-MCNC: 8.8 MG/DL (ref 8.3–10.6)
CHLORIDE SERPL-SCNC: 103 MMOL/L (ref 99–110)
CO2 SERPL-SCNC: 22 MMOL/L (ref 21–32)
CREAT SERPL-MCNC: 0.8 MG/DL (ref 0.5–1)
ERYTHROCYTE [DISTWIDTH] IN BLOOD BY AUTOMATED COUNT: 13.3 % (ref 12.4–15.4)
GFR, ESTIMATED: 87 ML/MIN/1.73M2
GLUCOSE SERPL-MCNC: 120 MG/DL (ref 70–99)
HCT VFR BLD AUTO: 45.6 % (ref 36–48)
HGB BLD-MCNC: 14.5 G/DL (ref 12–16)
MCH RBC QN AUTO: 31.5 PG (ref 26–34)
MCHC RBC AUTO-ENTMCNC: 31.8 G/DL (ref 31–36)
MCV RBC AUTO: 98.9 FL (ref 80–100)
MICROORGANISM SPEC CULT: NORMAL
MICROORGANISM SPEC CULT: NORMAL
PLATELET # BLD AUTO: 310 K/UL (ref 135–450)
PMV BLD AUTO: 10.2 FL
POTASSIUM SERPL-SCNC: 3.6 MMOL/L (ref 3.5–5.1)
RBC # BLD AUTO: 4.61 M/UL (ref 4–5.2)
SERVICE CMNT-IMP: NORMAL
SERVICE CMNT-IMP: NORMAL
SODIUM SERPL-SCNC: 138 MMOL/L (ref 136–145)
SPECIMEN DESCRIPTION: NORMAL
SPECIMEN DESCRIPTION: NORMAL
WBC OTHER # BLD: 12.5 K/UL (ref 4–11)

## 2025-07-05 PROCEDURE — 80048 BASIC METABOLIC PNL TOTAL CA: CPT

## 2025-07-05 PROCEDURE — 6360000002 HC RX W HCPCS: Performed by: HOSPITALIST

## 2025-07-05 PROCEDURE — 2580000003 HC RX 258: Performed by: HOSPITALIST

## 2025-07-05 PROCEDURE — 6370000000 HC RX 637 (ALT 250 FOR IP): Performed by: HOSPITALIST

## 2025-07-05 PROCEDURE — 2500000003 HC RX 250 WO HCPCS: Performed by: HOSPITALIST

## 2025-07-05 PROCEDURE — 6360000002 HC RX W HCPCS: Performed by: INTERNAL MEDICINE

## 2025-07-05 PROCEDURE — 85027 COMPLETE CBC AUTOMATED: CPT

## 2025-07-05 PROCEDURE — 36415 COLL VENOUS BLD VENIPUNCTURE: CPT

## 2025-07-05 PROCEDURE — 2580000003 HC RX 258: Performed by: INTERNAL MEDICINE

## 2025-07-05 RX ADMIN — SODIUM CHLORIDE 3000 MG: 9 INJECTION, SOLUTION INTRAVENOUS at 01:22

## 2025-07-05 RX ADMIN — ENOXAPARIN SODIUM 30 MG: 100 INJECTION SUBCUTANEOUS at 08:37

## 2025-07-05 RX ADMIN — CETIRIZINE HYDROCHLORIDE 10 MG: 10 TABLET, FILM COATED ORAL at 08:38

## 2025-07-05 RX ADMIN — SODIUM CHLORIDE: 0.9 INJECTION, SOLUTION INTRAVENOUS at 04:27

## 2025-07-05 RX ADMIN — SODIUM CHLORIDE, PRESERVATIVE FREE 10 ML: 5 INJECTION INTRAVENOUS at 08:37

## 2025-07-05 RX ADMIN — BUPROPION HYDROCHLORIDE 150 MG: 150 TABLET, FILM COATED ORAL at 08:38

## 2025-07-05 RX ADMIN — PANTOPRAZOLE SODIUM 40 MG: 40 TABLET, DELAYED RELEASE ORAL at 06:32

## 2025-07-05 RX ADMIN — SPIRONOLACTONE 100 MG: 25 TABLET ORAL at 08:38

## 2025-07-05 RX ADMIN — LISINOPRIL 10 MG: 10 TABLET ORAL at 08:39

## 2025-07-05 RX ADMIN — ESCITALOPRAM OXALATE 20 MG: 10 TABLET ORAL at 08:39

## 2025-07-05 RX ADMIN — SODIUM CHLORIDE 3000 MG: 9 INJECTION, SOLUTION INTRAVENOUS at 06:35

## 2025-07-05 RX ADMIN — BUSPIRONE HYDROCHLORIDE 15 MG: 5 TABLET ORAL at 08:38

## 2025-07-05 RX ADMIN — ZINC SULFATE 220 MG (50 MG) CAPSULE 50 MG: CAPSULE at 08:39

## 2025-07-05 ASSESSMENT — PAIN SCALES - GENERAL
PAINLEVEL_OUTOF10: 0
PAINLEVEL_OUTOF10: 0

## 2025-07-05 NOTE — DISCHARGE INSTR - COC
Continuity of Care Form    Patient Name: Krystle Pope   :  1980  MRN:  [id number]    Admit date:  2025  Discharge date:  ***    Code Status Order: Full Code   Advance Directives:     Admitting Physician:  Edi Chand MD  PCP: Nestor Barbosa MD    Discharging Nurse: ***  Discharging Hospital Unit/Room#: 3212/3212-01  Discharging Unit Phone Number: ***    Emergency Contact:   Extended Emergency Contact Information  Primary Emergency Contact: Thomas Pope  Address: [address] [place] [address] [place]  Home Phone: [phone]  Mobile Phone: [phone]  Relation: Spouse    Past Surgical History:  Past Surgical History:   Procedure Laterality Date    CARDIAC SURGERY      cardia cath-no blockage    CHOLECYSTECTOMY      EYE SURGERY      detached retina    OTHER SURGICAL HISTORY  14    L4-5 BILATERAL LAMINECTOMY AND DISCECTOMY    OVARY SURGERY      multiple times    TONSILLECTOMY  1985    TYMPANOSTOMY TUBE PLACEMENT         Immunization History:   Immunization History   Administered Date(s) Administered    COVID-19, PFIZER PURPLE top, DILUTE for use, (age 12 y+), 30mcg/0.3mL 2021, 04/15/2021, 2021    DTP 1981, 1981, 1981, 1982, 1986    Hep B, HEPLISAV-B, (age 18y+), IM, 0.5mL 2020, 2020    Influenza Virus Vaccine 2014, 2014, 2016, 10/03/2017, 2020    Influenza, AFLURIA (age 3 y+), FLUZONE, (age 6 mo+), Quadv MDV, 0.5mL 2015    Influenza, FLUARIX, FLULAVAL, FLUZONE (age 6 mo+) and AFLURIA, (age 3 y+), Quadv PF, 0.5mL 10/03/2017, 10/10/2018, 2022    Influenza, FLUBLOK, (age 18 y+), Quadv PF, 0.5mL 2020    Influenza, FLUCELVAX, (age 6 mo+), MDCK, Quadv PF, 0.5mL 2021    MMR, PRIORIX, M-M-R II, (age 12m+), SC, 0.5mL 1982, 1993    PPD Test 2020    Pneumococcal, PCV-13, PREVNAR 13, (age 6w+), IM, 0.5mL 2020    Pneumococcal,  PPSV23, PNEUMOVAX 23, (age 2y+), SC/IM, 0.5mL 02/02/2019    Polio OPV 01/19/1981, 03/19/1981, 05/21/1982, 03/28/1986    Td vaccine (adult) 05/02/1997, 11/01/2012       Active Problems:  Patient Active Problem List   Diagnosis Code    Lumbar disc disease with radiculopathy M51.16    HTN (hypertension) I10    PCOS (polycystic ovarian syndrome) E28.2    Endometriosis N80.9    Anxiety and depression F41.9, F32.A    Obstructive sleep apnea syndrome G47.33    Morbid obesity, unspecified obesity type ([facility]) E66.01    Lower abdominal pain R10.30    Hematuria R31.9    Abnormal uterine bleeding (AUB) N93.9    Bilateral chronic serous otitis media H65.23    Bilateral rotator cuff dysfunction M67.911, M67.912    Cervical spondylosis without myelopathy M47.812    Hidradenitis suppurativa L73.2    History of psoriatic arthritis Z87.2    Horseshoe tear of retina of right eye without detachment H33.311    Ingrown right greater toenail L60.0    Irritable bowel syndrome with diarrhea K58.0    Lumbosacral spondylosis without myelopathy M47.817    Oligomenorrhea N91.5    Other chronic pain G89.29    Pain in toe of right foot M79.674    Pelvic pain in female R10.2    Primary osteoarthritis of both knees M17.0    Psoriatic arthritis ([facility]) L40.50    Rosacea L71.9    Type 2 diabetes mellitus with complication, without long-term current use of insulin ([facility]) E11.8    Hirsutism L68.0    Morbid obesity due to excess calories ([facility]) E66.01    Controlled type 2 diabetes mellitus without complication, without long-term current use of insulin ([facility]) E11.9    Abnormal thyroid blood test R79.89    Vomiting R11.10    Cellulitis L03.90    Panniculitis M79.3    Neutrophilia D72.828    Adalimumab (Humira) long-term use Z79.620    Actinomyces infection A42.9    Morbid obesity with BMI of 45.0-49.9, adult ([facility]) E66.01, Z68.42    Cellulitis of pubic region L03.319       Isolation/Infection:   Isolation            No Isolation          Patient Infection Status     { JERICA Safety Concerns:153630512}    Impairments/Disabilities:      { JERICA Impairments/Disabilities:949048984}    Nutrition Therapy:  Current Nutrition Therapy:   { JERICA Diet List:526698789}    Routes of Feeding: {[facility] DME Other Feedings:427710448}  Liquids: {Slp liquid thickness:72222}  Daily Fluid Restriction: {[facility] DME Yes amt example:922239720}  Last Modified Barium Swallow with Video (Video Swallowing Test): {Done Not Done Date:}    Treatments at the Time of Hospital Discharge:   Respiratory Treatments: ***  Oxygen Therapy:  {Therapy; copd oxygen:56479}  Ventilator:    {[facility] Vent List:625849565}    Rehab Therapies: {THERAPEUTIC INTERVENTION:7465891872}  Weight Bearing Status/Restrictions: {[facility] Weight Bearin}  Other Medical Equipment (for information only, NOT a DME order):  {EQUIPMENT:458658758}  Other Treatments: ***    Patient's personal belongings (please select all that are sent with patient):  {[facility] DME Belongings:981165802}    RN SIGNATURE:  {Esignature:371323090}    CASE MANAGEMENT/SOCIAL WORK SECTION    Inpatient Status Date: ***    Readmission Risk Assessment Score:  [facility] RISK OF UNPLANNED READMISSION 2.0             8 Total Score        Discharging to Facility/ Agency   Name:   Address:  Phone:  Fax:    Dialysis Facility (if applicable)   Name:  Address:  Dialysis Schedule:  Phone:  Fax:    / signature: {Esignature:299875723}    PHYSICIAN SECTION    Prognosis: {Prognosis:3649794007}    Condition at Discharge: { Patient Condition:904797710}    Rehab Potential (if transferring to Rehab): {Prognosis:1647993291}    Recommended Labs or Other Treatments After Discharge: ***    Physician Certification: I certify the above information and transfer of Krystle Pope  is necessary for the continuing treatment of the diagnosis listed and that she requires {Admit to Appropriate Level of Care:24667} for {GREATER/LESS:961234377} 30 days.     Update Admission

## 2025-07-05 NOTE — PLAN OF CARE
Problem: Discharge Planning  Goal: Discharge to home or other facility with appropriate resources  7/5/2025 1051 by Nadia Argueta, RN  Outcome: Progressing     Problem: Discharge Planning  Goal: Discharge to home or other facility with appropriate resources  7/5/2025 1051 by Nadia Argueta, RN  Outcome: Progressing  7/4/2025 2248 by Sb Urban, RN  Outcome: Progressing     Problem: Chronic Conditions and Co-morbidities  Goal: Patient's chronic conditions and co-morbidity symptoms are monitored and maintained or improved  7/4/2025 2248 by Sb Urban, RN  Outcome: Not Progressing

## 2025-07-05 NOTE — CARE COORDINATION
DISCHARGE ORDER  Date/Time 2025 11:06 AM  Completed by: Daya Traore RN, Case Management    Patient Name: Krystle Pope      : 1980  Admitting Diagnosis: Cellulitis [L03.90]  Cellulitis of pubic region [L03.319]      Admit order Date and Status:25  (verify MD's last order for status of admission)      Noted discharge order.   If applicable PT/OT recommendation at Discharge: na  DME recommendation by PT/OT:na    Discharge Plan: Pt to dc home on PO antibiotics.  No CM needs for discharge.     Date of Last IMM Given: na    Reviewed chart.  Role of discharge planner explained and patient verbalized understanding. Discharge order is noted.      Pt is being d/c'd to home today. Pt's O2 sats are 97% on ra.    Discharge timeout done with CM/Pt/RN. All discharge needs and concerns addressed.

## 2025-07-05 NOTE — PROGRESS NOTES
Hospitalist Progress Note  7/5/2025 10:19 AM    PCP: Nestor Barbosa MD    [id number]     Date of Admission: 7/1/2025                                                                                                                     HOSPITAL COURSE    Patient demographics:  The patient  Krystle Pope is a 44 y.o. female     Significant past medical history:   Patient Active Problem List   Diagnosis    Lumbar disc disease with radiculopathy    HTN (hypertension)    PCOS (polycystic ovarian syndrome)    Endometriosis    Anxiety and depression    Obstructive sleep apnea syndrome    Morbid obesity, unspecified obesity type (HCC)    Lower abdominal pain    Hematuria    Abnormal uterine bleeding (AUB)    Bilateral chronic serous otitis media    Bilateral rotator cuff dysfunction    Cervical spondylosis without myelopathy    Hidradenitis suppurativa    History of psoriatic arthritis    Horseshoe tear of retina of right eye without detachment    Ingrown right greater toenail    Irritable bowel syndrome with diarrhea    Lumbosacral spondylosis without myelopathy    Oligomenorrhea    Other chronic pain    Pain in toe of right foot    Pelvic pain in female    Primary osteoarthritis of both knees    Psoriatic arthritis (HCC)    Rosacea    Type 2 diabetes mellitus with complication, without long-term current use of insulin (HCC)    Hirsutism    Morbid obesity due to excess calories (HCC)    Controlled type 2 diabetes mellitus without complication, without long-term current use of insulin (HCC)    Abnormal thyroid blood test    Vomiting    Cellulitis    Panniculitis    Neutrophilia    Adalimumab (Humira) long-term use    Actinomyces infection    Morbid obesity with BMI of 45.0-49.9, adult (HCC)    Cellulitis of pubic region         Presenting symptoms:      Diagnostic workup:      CONSULTS DURING ADMISSION :   IP CONSULT TO INFECTIOUS DISEASES      Patient was diagnosed with:        Treatment while inpatient:  The  ampicillin-sulbactam  3,000 mg IntraVENous Q6H    lisinopril  10 mg Oral Daily    amLODIPine  10 mg Oral Nightly    buPROPion  150 mg Oral BID    busPIRone  15 mg Oral BID    cetirizine  10 mg Oral Daily    vitamin D  50,000 Units Oral Weekly    escitalopram  20 mg Oral Daily    pantoprazole  40 mg Oral QAM AC    spironolactone  100 mg Oral BID    zinc sulfate  50 mg Oral Daily    sodium chloride flush  5-40 mL IntraVENous 2 times per day    enoxaparin  30 mg SubCUTAneous BID    polyethylene glycol  17 g Oral Daily     Continuous Infusions:   sodium chloride 75 mL/hr at 07/05/25 0427    sodium chloride       PRN Meds:  loperamide, oxyCODONE **OR** oxyCODONE, morphine, ketorolac, prochlorperazine, sodium chloride flush, sodium chloride, potassium chloride **OR** potassium alternative oral replacement **OR** potassium chloride, ondansetron **OR** ondansetron, acetaminophen **OR** acetaminophen    Vitals   Vitals /wt Patient Vitals for the past 8 hrs:   BP Temp Temp src Pulse Resp SpO2   07/05/25 0837 (!) 140/81 -- -- -- -- --   07/05/25 0429 124/79 97.5 °F (36.4 °C) Oral 88 18 97 %        72HR INTAKE/OUTPUT:    Intake/Output Summary (Last 24 hours) at 7/5/2025 1019  Last data filed at 7/5/2025 0635  Gross per 24 hour   Intake 720 ml   Output --   Net 720 ml       Exam:    Gen:   Alert and oriented ×3    Eyes: PERRL. No sclera icterus. No conjunctival injection.   ENT: No discharge. Pharynx clear. External appearance of ears and nose normal.  Neck: Trachea midline. No obvious mass.    Resp: No accessory muscle use. No crackles. No wheezes. No rhonchi.  CV: Regular rate. Regular rhythm. No murmur or rub. No edema.   GI: Swelling erythema and ulceration of the pannus in suprapubic area  Lymph: No cervical LAD. No supraclavicular LAD.   M/S: / Ext. No cyanosis. No clubbing. No joint deformity.    Neuro: CN 2-12 are intact,  no neurologic deficits noted.    PT/INR: No results for input(s): \"PROTIME\", \"INR\" in the last 72  MD    This note was transcribed using Dragon Dictation software. Please disregard any translational errors.

## 2025-07-10 NOTE — DISCHARGE SUMMARY
Hospital Medicine Discharge Summary      Patient ID: Krystle Pope , [id number]     Patient's PCP: Nestor Barbosa MD    Admit Date: 7/1/2025     Discharge Date: 7/5/2025      Admitting Physician: Edi Pennington MD    Discharge Physician: EDI PENNINGTON MD     Discharge Diagnoses:     Active Hospital Problems    Diagnosis Date Noted    Panniculitis [M79.3] 07/03/2025    Neutrophilia [D72.828] 07/03/2025    Adalimumab (Humira) long-term use [Z79.620] 07/03/2025    Actinomyces infection [A42.9] 07/03/2025    Morbid obesity with BMI of 45.0-49.9, adult (HCC) [E66.01, Z68.42] 07/03/2025    Cellulitis of pubic region [L03.319] 07/03/2025    Cellulitis [L03.90] 07/01/2025         The patient was seen and examined on the day of discharge and this discharge summary is in conjunction with any daily progress note from day of discharge.          HOSPITAL COURSE    Patient demographics:  The patient  Krystle Pope is a 44 y.o. female     Significant past medical history:   Patient Active Problem List   Diagnosis    Lumbar disc disease with radiculopathy    HTN (hypertension)    PCOS (polycystic ovarian syndrome)    Endometriosis    Anxiety and depression    Obstructive sleep apnea syndrome    Morbid obesity, unspecified obesity type (HCC)    Lower abdominal pain    Hematuria    Abnormal uterine bleeding (AUB)    Bilateral chronic serous otitis media    Bilateral rotator cuff dysfunction    Cervical spondylosis without myelopathy    Hidradenitis suppurativa    History of psoriatic arthritis    Horseshoe tear of retina of right eye without detachment    Ingrown right greater toenail    Irritable bowel syndrome with diarrhea    Lumbosacral spondylosis without myelopathy    Oligomenorrhea    Other chronic pain    Pain in toe of right foot    Pelvic pain in female    Primary osteoarthritis of both knees    Psoriatic arthritis (HCC)    Rosacea    Type 2 diabetes mellitus with complication, without long-term

## 2025-07-11 ENCOUNTER — OFFICE VISIT (OUTPATIENT)
Dept: ENDOCRINOLOGY | Age: 45
End: 2025-07-11
Payer: COMMERCIAL

## 2025-07-11 VITALS
HEART RATE: 76 BPM | BODY MASS INDEX: 47.12 KG/M2 | WEIGHT: 276 LBS | OXYGEN SATURATION: 97 % | SYSTOLIC BLOOD PRESSURE: 145 MMHG | DIASTOLIC BLOOD PRESSURE: 84 MMHG | HEIGHT: 64 IN

## 2025-07-11 DIAGNOSIS — E66.01 MORBID OBESITY DUE TO EXCESS CALORIES (HCC): ICD-10-CM

## 2025-07-11 DIAGNOSIS — E28.2 PCOS (POLYCYSTIC OVARIAN SYNDROME): Primary | ICD-10-CM

## 2025-07-11 DIAGNOSIS — L68.0 HIRSUTISM: ICD-10-CM

## 2025-07-11 PROCEDURE — 3079F DIAST BP 80-89 MM HG: CPT | Performed by: INTERNAL MEDICINE

## 2025-07-11 PROCEDURE — 99214 OFFICE O/P EST MOD 30 MIN: CPT | Performed by: INTERNAL MEDICINE

## 2025-07-11 PROCEDURE — 3077F SYST BP >= 140 MM HG: CPT | Performed by: INTERNAL MEDICINE

## 2025-07-11 NOTE — PROGRESS NOTES
Patient ID:   Krystle Pope is a 44 y.o. female    Chief Complaint:   Krystle Pope is seen for an evaluation of PCOS, abnormal thyroid numbers, high prolactin.      Subjective:   Krystle Pope is sent here for three multiple medical problems. First two are outside endocrine specialty. Will discuss PCOS.     PCOS diagnosed at age 15. Had fertility issues. Had endometrioses. Had one miscarriage. No kids.   Had mirena in Oct 2022. No cycles since Oct 2022.     Family history of PCOS or excessive hair growth: Mother has PCOS    Denies exposure to testosterone or OTC compounded creams     Her mother had a stroke in April 2023. She contracted COVID in the hospital.     Interval history:   On spironolactone 100 mg bid ( for Hidradenitis suppurativa). Having flares   Not sexually active. Not on birth control pills, high risk due to age.     She has no breast tenderness, no discharge   Weight is DOWN 19 LBS. Today 276 lbs. She was seeing weight loss at [facility]. She was on wellbutrin and naloxone, off now      stable hair growth on face, neck, chin. Not a lot of hair loss.     Currently on OCPs    Off IUD , no cycles since 2022     Diet: eating less than 1500 calories. Saw weight loss physician with [facility]. Taking ozempic 1 mg once a week on Mondays. Now managed bu pcp.   Exercise : limited due to hidradenitis suppurativa    Type 2 DM diagnosed in 2018   off Ozempic, caused stomach issues   As per pcp     Thyroid nodules   Seeing Dr. Hernandez   Sister had hurthle cell carcinoma with mets to lungs and follows with [facility]     The following portions of the patient's history were reviewed and updated as appropriate:      Family History   Problem Relation Age of Onset    Kidney Disease Mother         CKD    Hypertension Mother     Diabetes Mother     Cancer Mother         Skin cancer    High Blood Pressure Father     COPD Father     Emphysema Father     Elevated Lipids Sister         hypertriglyceridemia    Thyroid Cancer

## 2025-07-15 NOTE — PROGRESS NOTES
Physician Progress Note      PATIENT:               CHERELLE BENDER  CSN #:                  [id number]  :                       1980  ADMIT DATE:       2025 2:18 PM  DISCH DATE:        2025 11:18 AM  RESPONDING  PROVIDER #:        Edi Chand MD          QUERY TEXT:    Cellulitis of abdominal wall is documented in the medical record Progress   Notes by Gustavo Dong MD at 2025.  Please clarify any associated   diagnoses:    The clinical indicators include:  This is 44 yrs, female present with cellulitis.    --\"she developed increasing pain swelling and redness of the area with   associated body aches, fevers, chills, nausea and vomiting\", ED Provider Notes   by Chikis Boogie PA-C at 2025.    --\"Cellulitis in the suprapubic area Patient is started on Zosyn\" - H &P by   Edi Chand MD at 2025.    --\"she developed nausea vomiting increasing pain and redness in the suprapubic   area. Per HPI she had cultures which was positive for Actinomyces \" Progress   Notes by Gustavo Dong MD at 2025.    --WBC: 14.1(), 14.7(), 14.1 (7/3), 12.7(), 12.5() From epic result   review.    --HR: 98, 96, 97(), 103, 109, 117, 98(), 105(7/3), RR: 20, 24, 22().   From epic flowsheet.    --IV Zosyn ( to 7/3), IV Unasyn ( to ) IV sodium chloride 0.9 % bolus   1,000 mL (), 0.9 % sodium chloride infusion ( to ) - from [facility];   ID consulted.    Thank you,  Pao Taylor [facility], CDS  Options provided:  -- Sepsis due to cellulitis , present on admission  -- Cellulitis  without sepsis  -- Other - I will add my own diagnosis  -- Disagree - Not applicable / Not valid  -- Disagree - Clinically unable to determine / Unknown  -- Refer to Clinical Documentation Reviewer    PROVIDER RESPONSE TEXT:    This patient has sepsis due to cellulitis which was present on admission.    Query created by: Pao Taylor on 2025 9:11 AM      Electronically

## 2025-07-17 DIAGNOSIS — L68.0 HIRSUTISM: ICD-10-CM

## 2025-07-17 DIAGNOSIS — E66.01 MORBID OBESITY DUE TO EXCESS CALORIES (HCC): ICD-10-CM

## 2025-07-17 DIAGNOSIS — E28.2 PCOS (POLYCYSTIC OVARIAN SYNDROME): ICD-10-CM

## 2025-07-17 LAB — PROLACTIN SERPL IA-MCNC: 22.3 NG/ML

## 2025-07-18 LAB
SHBG SERPL-SCNC: 29 NMOL/L (ref 25–122)
TESTOST FREE SERPL-MCNC: 3.1 PG/ML (ref 1.1–5.8)
TESTOST SERPL-MCNC: 16 NG/DL (ref 8–48)